# Patient Record
Sex: MALE | Race: WHITE | NOT HISPANIC OR LATINO | Employment: FULL TIME | URBAN - METROPOLITAN AREA
[De-identification: names, ages, dates, MRNs, and addresses within clinical notes are randomized per-mention and may not be internally consistent; named-entity substitution may affect disease eponyms.]

---

## 2017-01-09 ENCOUNTER — OFFICE VISIT (OUTPATIENT)
Dept: FAMILY MEDICINE | Facility: CLINIC | Age: 32
End: 2017-01-09
Payer: COMMERCIAL

## 2017-01-09 VITALS
HEART RATE: 89 BPM | HEIGHT: 70 IN | WEIGHT: 210 LBS | OXYGEN SATURATION: 97 % | SYSTOLIC BLOOD PRESSURE: 127 MMHG | BODY MASS INDEX: 30.06 KG/M2 | DIASTOLIC BLOOD PRESSURE: 77 MMHG | TEMPERATURE: 97.4 F

## 2017-01-09 DIAGNOSIS — R68.82 DECREASED LIBIDO: Primary | ICD-10-CM

## 2017-01-09 DIAGNOSIS — R53.83 FATIGUE, UNSPECIFIED TYPE: ICD-10-CM

## 2017-01-09 PROBLEM — F11.20 HEROIN ADDICTION (H): Status: ACTIVE | Noted: 2017-01-09

## 2017-01-09 LAB
BASOPHILS # BLD AUTO: 0.1 10E9/L (ref 0–0.2)
BASOPHILS NFR BLD AUTO: 0.4 %
DIFFERENTIAL METHOD BLD: ABNORMAL
EOSINOPHIL # BLD AUTO: 0.2 10E9/L (ref 0–0.7)
EOSINOPHIL NFR BLD AUTO: 1.3 %
ERYTHROCYTE [DISTWIDTH] IN BLOOD BY AUTOMATED COUNT: 12.6 % (ref 10–15)
HCT VFR BLD AUTO: 45.1 % (ref 40–53)
HGB BLD-MCNC: 14.8 G/DL (ref 13.3–17.7)
LYMPHOCYTES # BLD AUTO: 2.5 10E9/L (ref 0.8–5.3)
LYMPHOCYTES NFR BLD AUTO: 22.5 %
MCH RBC QN AUTO: 29.7 PG (ref 26.5–33)
MCHC RBC AUTO-ENTMCNC: 32.8 G/DL (ref 31.5–36.5)
MCV RBC AUTO: 90 FL (ref 78–100)
MONOCYTES # BLD AUTO: 1.2 10E9/L (ref 0–1.3)
MONOCYTES NFR BLD AUTO: 10.8 %
NEUTROPHILS # BLD AUTO: 7.3 10E9/L (ref 1.6–8.3)
NEUTROPHILS NFR BLD AUTO: 65 %
PLATELET # BLD AUTO: 202 10E9/L (ref 150–450)
RBC # BLD AUTO: 4.99 10E12/L (ref 4.4–5.9)
WBC # BLD AUTO: 11.2 10E9/L (ref 4–11)

## 2017-01-09 PROCEDURE — 85025 COMPLETE CBC W/AUTO DIFF WBC: CPT | Performed by: FAMILY MEDICINE

## 2017-01-09 PROCEDURE — 84403 ASSAY OF TOTAL TESTOSTERONE: CPT | Performed by: FAMILY MEDICINE

## 2017-01-09 PROCEDURE — 99214 OFFICE O/P EST MOD 30 MIN: CPT | Performed by: FAMILY MEDICINE

## 2017-01-09 PROCEDURE — 84270 ASSAY OF SEX HORMONE GLOBUL: CPT | Performed by: FAMILY MEDICINE

## 2017-01-09 PROCEDURE — 36415 COLL VENOUS BLD VENIPUNCTURE: CPT | Performed by: FAMILY MEDICINE

## 2017-01-09 PROCEDURE — 99000 SPECIMEN HANDLING OFFICE-LAB: CPT | Performed by: FAMILY MEDICINE

## 2017-01-09 ASSESSMENT — PAIN SCALES - GENERAL: PAINLEVEL: NO PAIN (0)

## 2017-01-09 NOTE — Clinical Note
[facility]   [address] [place]  [address]  [phone]                               January 13, 2017    Parish Carter  [address] [facility] [address]        Dear Parish,    Your testosterone level is normal    Results for orders placed or performed in visit on 01/09/17   CBC with platelets differential   Result Value Ref Range    WBC 11.2 (H) 4.0 - 11.0 10e9/L    RBC Count 4.99 4.4 - 5.9 10e12/L    Hemoglobin 14.8 13.3 - 17.7 g/dL    Hematocrit 45.1 40.0 - 53.0 %    MCV 90 78 - 100 fl    MCH 29.7 26.5 - 33.0 pg    MCHC 32.8 31.5 - 36.5 g/dL    RDW 12.6 10.0 - 15.0 %    Platelet Count 202 150 - 450 10e9/L    Diff Method Automated Method     % Neutrophils 65.0 %    % Lymphocytes 22.5 %    % Monocytes 10.8 %    % Eosinophils 1.3 %    % Basophils 0.4 %    Absolute Neutrophil 7.3 1.6 - 8.3 10e9/L    Absolute Lymphocytes 2.5 0.8 - 5.3 10e9/L    Absolute Monocytes 1.2 0.0 - 1.3 10e9/L    Absolute Eosinophils 0.2 0.0 - 0.7 10e9/L    Absolute Basophils 0.1 0.0 - 0.2 10e9/L   Testosterone Free and Total   Result Value Ref Range    Testosterone Total 460 240 - 950 ng/dL    Sex Hormone Binding Globulin 18 11 - 80 nmol/L    Free Testosterone Calculated 12.80 4.7 - 24.4 ng/dL   If you have any questions please call the clinic at [phone]    Sincerely,    Lul Armenta MD  bmd

## 2017-01-09 NOTE — PROGRESS NOTES
"  SUBJECTIVE:                                                    Parish Carter is a 31 year old male who presents to clinic today for the following health issues:    Patient is here for his blood pressure and would like to have his Testosterone checked.    Problem list and histories reviewed & adjusted, as indicated.    Ros: no chest pain, chest tightness   No sob   No leg edema   No abdominal pain     Denies fever or chills   Weight stable     Patient is fluctuating with his weight due to body building and his weight ballooned to 237   He did not work out in child     He is on probation   He gets testing done randomly     He has been clean for 10 months     Single   No kids     Mom is around   Father was an alcoholic and  from an accident     O: /77 mmHg  Pulse 89  Temp(Src) 97.4  F (36.3  C) (Oral)  Ht 5' 10\" (1.778 m)  Wt 210 lb (95.255 kg)  BMI 30.13 kg/m2  SpO2 97%  Patient is muscular   He is alert       Head: Normocephalic, atraumatic.  Eyes: Conjunctiva clear, non icteric. PERRLA.  Ears: External ears and TMs normal BL.  Nose: Septum midline, nasal mucosa pink and moist. No discharge.  Mouth / Throat: Normal dentition.  No oral lesions. Pharynx non erythematous, tonsils without hypertrophy.  Neck: Supple, no enlarged LN, trachea midline.    Chest wall normal to inspection and palpation. Good excursion bilaterally. Lungs clear to auscultation. Good air movement bilaterally without rales, wheezes, or rhonchi.   Regular rate and  rhythm. S1 and S2 normal, no murmurs, clicks, gallops or rubs. No edema or JVD.                ICD-10-CM    1. Decreased libido R68.82 Testosterone Free and Total     CANCELED: TESTOSTERONE, FREE & TOTAL   2. Fatigue, unspecified type R53.83 CBC with platelets differential     bp is normal   He should check it as an outpt   He is recovering from heroin and has been 10 months sober   I think his fatigue is still related to his heroin withdrawal     Await labs   "

## 2017-01-09 NOTE — MR AVS SNAPSHOT
"              After Visit Summary   2017    Parish Carter    MRN: [id number]           Patient Information     Date Of Birth          1985        Visit Information        Provider Department      2017 4:40 PM Lul Armenta MD [facility]        Today's Diagnoses     Decreased libido    -  1     Fatigue, unspecified type            Follow-ups after your visit        Who to contact     If you have questions or need follow up information about today's clinic visit or your schedule please contact [facility] directly at [phone].  Normal or non-critical lab and imaging results will be communicated to you by FirstStringhart, letter or phone within 4 business days after the clinic has received the results. If you do not hear from us within 7 days, please contact the clinic through FirstStringhart or phone. If you have a critical or abnormal lab result, we will notify you by phone as soon as possible.  Submit refill requests through [email or web address] or call your pharmacy and they will forward the refill request to us. Please allow 3 business days for your refill to be completed.          Additional Information About Your Visit        MyChart Information     [email or web address] lets you send messages to your doctor, view your test results, renew your prescriptions, schedule appointments and more. To sign up, go to [email or web address] . Click on \"Log in\" on the left side of the screen, which will take you to the Welcome page. Then click on \"Sign up Now\" on the right side of the page.     You will be asked to enter the access code listed below, as well as some personal information. Please follow the directions to create your username and password.     Your access code is: [email or web address]  Expires: 2017  5:23 PM     Your access code will  in 90 days. If you need help or a new code, please call your [facility] or [phone].        Care EveryWhere ID     This is your Care " "EveryWhere ID. This could be used by other organizations to access your [place] medical records  [id number]        Your Vitals Were     Pulse Temperature Height BMI (Body Mass Index) Pulse Oximetry       89 97.4  F (36.3  C) (Oral) 5' 10\" (1.778 m) 30.13 kg/m2 97%        Blood Pressure from Last 3 Encounters:   01/09/17 127/77   01/11/16 105/75    Weight from Last 3 Encounters:   01/09/17 210 lb (95.255 kg)   01/10/16 195 lb (88.451 kg)              We Performed the Following     CBC with platelets differential     TESTOSTERONE, FREE & TOTAL        Primary Care Provider    Physician No Ref-Primary       No address on file        Thank you!     Thank you for choosing [facility]  for your care. Our goal is always to provide you with excellent care. Hearing back from our patients is one way we can continue to improve our services. Please take a few minutes to complete the written survey that you may receive in the mail after your visit with us. Thank you!             Your Updated Medication List - Protect others around you: Learn how to safely use, store and throw away your medicines at www.disposemymeds.org.      Notice  As of 1/9/2017  5:23 PM    You have not been prescribed any medications.      "

## 2017-01-09 NOTE — Clinical Note
[facility]   [address] [place]  [address]  [phone]                                   January 10, 2017    Parish Carter  [address] [facility] [address]        Dear Parish,    Your recent hemoglobin and blood cell counts are normal.     Results for orders placed or performed in visit on 01/09/17   CBC with platelets differential   Result Value Ref Range    WBC 11.2 (H) 4.0 - 11.0 10e9/L    RBC Count 4.99 4.4 - 5.9 10e12/L    Hemoglobin 14.8 13.3 - 17.7 g/dL    Hematocrit 45.1 40.0 - 53.0 %    MCV 90 78 - 100 fl    MCH 29.7 26.5 - 33.0 pg    MCHC 32.8 31.5 - 36.5 g/dL    RDW 12.6 10.0 - 15.0 %    Platelet Count 202 150 - 450 10e9/L    Diff Method Automated Method     % Neutrophils 65.0 %    % Lymphocytes 22.5 %    % Monocytes 10.8 %    % Eosinophils 1.3 %    % Basophils 0.4 %    Absolute Neutrophil 7.3 1.6 - 8.3 10e9/L    Absolute Lymphocytes 2.5 0.8 - 5.3 10e9/L    Absolute Monocytes 1.2 0.0 - 1.3 10e9/L    Absolute Eosinophils 0.2 0.0 - 0.7 10e9/L    Absolute Basophils 0.1 0.0 - 0.2 10e9/L       If you have any questions please call the clinic at [phone]    Sincerely,    Lul Armenta MD  bmd

## 2017-01-09 NOTE — NURSING NOTE
"Chief Complaint   Patient presents with     Hypertension     Has been elevated     Blood Draw     Testosterone       Initial /77 mmHg  Pulse 89  Temp(Src) 97.4  F (36.3  C) (Oral)  Ht 5' 10\" (1.778 m)  Wt 210 lb (95.255 kg)  BMI 30.13 kg/m2  SpO2 97% Estimated body mass index is 30.13 kg/(m^2) as calculated from the following:    Height as of this encounter: 5' 10\" (1.778 m).    Weight as of this encounter: 210 lb (95.255 kg).  BP completed using cuff size: Juan Pablo Walker MA      "

## 2017-01-10 NOTE — PROGRESS NOTES
Quick Note:    Your total blood count was normal except for a mildly elevated white blood cell count  ______

## 2017-01-12 LAB
SHBG SERPL-SCNC: 18 NMOL/L (ref 11–80)
TESTOST FREE SERPL-MCNC: 12.8 NG/DL (ref 4.7–24.4)
TESTOST SERPL-MCNC: 460 NG/DL (ref 240–950)

## 2018-04-27 ENCOUNTER — OFFICE VISIT (OUTPATIENT)
Dept: FAMILY MEDICINE | Facility: CLINIC | Age: 33
End: 2018-04-27
Payer: COMMERCIAL

## 2018-04-27 VITALS
WEIGHT: 229 LBS | HEART RATE: 88 BPM | SYSTOLIC BLOOD PRESSURE: 148 MMHG | HEIGHT: 70 IN | TEMPERATURE: 97.7 F | BODY MASS INDEX: 32.78 KG/M2 | DIASTOLIC BLOOD PRESSURE: 86 MMHG

## 2018-04-27 DIAGNOSIS — R07.9 CHEST PAIN, UNSPECIFIED TYPE: ICD-10-CM

## 2018-04-27 DIAGNOSIS — F11.11 HISTORY OF HEROIN ABUSE (H): ICD-10-CM

## 2018-04-27 DIAGNOSIS — R03.0 ELEVATED BLOOD-PRESSURE READING WITHOUT DIAGNOSIS OF HYPERTENSION: Primary | ICD-10-CM

## 2018-04-27 PROCEDURE — 99214 OFFICE O/P EST MOD 30 MIN: CPT | Performed by: FAMILY MEDICINE

## 2018-04-27 NOTE — MR AVS SNAPSHOT
After Visit Summary   4/27/2018    Parish Carter    MRN: [id number]           Patient Information     Date Of Birth          1985        Visit Information        Provider Department      4/27/2018 3:20 PM Regina Fortune MD [facility] Instructions    -Recheck blood pressure on Tuesday, May 1, 2018.  -Follow up in the ER immediately if:  chest pain > 5 minutes, shortness of breath at rest, severe/worsening headache, vision changes, or other severe/emergent symptoms, as discussed.    [facility]   Discharged by : Vanda CANCINO MA    If you have any questions regarding your visit please contact your care team:     Team Gold                Clinic Hours Telephone Number     Dr. Kathy Cleveland, NP 7am-7pm  Monday - Thursday   7am-5pm  Fridays  [phone]   (Appointment scheduling available 24/7)     RN Line  [phone] option 2     Urgent Care - [place] - 11am-9pm Monday-Friday Saturday-Sunday- 9am-5pm     [place] -   5pm-9pm Monday-Friday Saturday-Sunday- 9am-5pm    [phone] - [place]    [phone] - [place]       For a Price Quote for your services, please call our Consumer Price Line at [phone].     What options do I have for visits at the clinic other than the traditional office visit?     To expand how we care for you, many of our providers are utilizing electronic visits (e-visits) and telephone visits, when medically appropriate, for interactions with their patients rather than a visit in the clinic. We also offer nurse visits for many medical concerns. Just like any other service, we will bill your insurance company for this type of visit based on time spent on the phone with your provider. Not all insurance companies cover these visits. Please check with your medical insurance if this type of  visit is covered. You will be responsible for any charges that are not paid by your insurance.   E-visits via EveryMovehart: generally incur a $35.00 fee.     Telephone visits:  Time spent on the phone: *charged based on time that is spent on the phone in increments of 10 minutes. Estimated cost:   5-10 mins $30.00   11-20 mins. $59.00   21-30 mins. $85.00       Use EveryMovehart (secure email communication and access to your chart) to send your primary care provider a message or make an appointment. Ask someone on your Team how to sign up for kissnofrog.     As always, Thank you for trusting us with your health care needs!      [facility]:    Scheduling Appointments  Jose Daniel, [place]  Call: [phone]    [facility]  Call: [phone]    [facility]  Call: [phone]    For Gastroenterology referrals   [facility] Gastroenterology   Clinics and Surgery Center, 4th Floor   [address] [place] [address]   Appointments: [phone]    WHERE TO GO FOR CARE?  Clinic    Make an appointment if you:       Are sick (cold, cough, flu, sore throat, earache or in pain).       Have a small injury (sprain, small cut, burn or broken bone).       Need a physical exam, Pap smear, vaccine or prescription refill.       Have questions about your health or medicines.    To reach us:      Call [phone] ([phone]). Open 24 hours every day. (For counseling services, call [phone].)    Log into kissnofrog at [email or web address] [email or web address]. (Visit Advanced [email or web address] to create an account.) Hospital emergency room    An emergency is a serious or life- threatening problem that must be treated right away.    Call [phone] or get to the hospital if you have:      Very bad or sudden:            - Chest pain or pressure         - Bleeding         - Head or belly pain         - Dizziness or trouble seeing, walking or                           Speaking      Problems breathing      Blood in your vomit or you are coughing up blood      A major injury (knocked out, loss of a finger or limb, rape, broken bone protruding from skin)    A mental health crisis. (Or call the Mental Health Crisis line at 1-325.445.8889 or Suicide Prevention Hotline at 1-437.129.8978.)    Open 24 hours every day. You don't need an appointment.     Urgent care    Visit urgent care for sickness or small injuries when the clinic is closed. You don't need an appointment. To check hours or find an urgent care near you, visit [email or web address]. Online care    Get online care from [employer] for more than 70 common problems, like colds, allergies and infections. Open 24 hours every day at:   [email or web address]   Need help deciding?    For advice about where to be seen, you may call your clinic and ask to speak with a nurse. We're here for you 24 hours every day.         If you are deaf or hard of hearing, please let us know. We provide many free services including sign language interpreters, oral interpreters, TTYs, telephone amplifiers, note takers and written materials.                   Follow-ups after your visit        Who to contact     If you have questions or need follow up information about today's clinic visit or your schedule please contact [facility] directly at [phone].  Normal or non-critical lab and imaging results will be communicated to you by Carbylan BioSurgeryhart, letter or phone within 4 business days after the clinic has received the results. If you do not hear from us within 7 days, please contact the clinic through Carbylan BioSurgeryhart or phone. If you have a critical or abnormal lab result, we will notify you by phone as soon as possible.  Submit refill requests through WISETIVI or call your pharmacy and they will forward the refill request to us. Please allow 3 business days for your refill to be completed.          Additional Information About Your Visit        WISETIVI  "Information     PictureMe Universe lets you send messages to your doctor, view your test results, renew your prescriptions, schedule appointments and more. To sign up, go to [email or web address] . Click on \"Log in\" on the left side of the screen, which will take you to the Welcome page. Then click on \"Sign up Now\" on the right side of the page.     You will be asked to enter the access code listed below, as well as some personal information. Please follow the directions to create your username and password.     Your access code is: [email or web address]  Expires: 2018  4:45 PM     Your access code will  in 90 days. If you need help or a new code, please call your [place] clinic or [phone].        Care EveryWhere ID     This is your Care EveryWhere ID. This could be used by other organizations to access your [place] medical records  [id number]        Your Vitals Were     Pulse Temperature Height BMI (Body Mass Index)          88 97.7  F (36.5  C) (Oral) 5' 10\" (1.778 m) 32.86 kg/m2         Blood Pressure from Last 3 Encounters:   18 148/86   17 127/77   16 105/75    Weight from Last 3 Encounters:   18 229 lb (103.9 kg)   17 210 lb (95.3 kg)   01/10/16 195 lb (88.5 kg)              Today, you had the following     No orders found for display       Primary Care Provider Fax #    Physician No Ref-Primary [phone]       No address on file        Equal Access to Services     [facility]: Hadii sri Caceres, waaxda luqadaha, qaybta kaalmaflorida blue . So [facility] [phone].    ATENCIÓN: Si habla español, tiene a martinez disposición servicios gratuitos de asistencia lingüística. Llame al [phone].    We comply with applicable federal civil rights laws and [place] laws. We do not discriminate on the basis of race, color, national origin, age, disability, sex, sexual orientation, or gender identity.            Thank you!     Thank you " for choosing [facility]  for your care. Our goal is always to provide you with excellent care. Hearing back from our patients is one way we can continue to improve our services. Please take a few minutes to complete the written survey that you may receive in the mail after your visit with us. Thank you!             Your Updated Medication List - Protect others around you: Learn how to safely use, store and throw away your medicines at www.disposemymeds.org.      Notice  As of 4/27/2018  4:45 PM    You have not been prescribed any medications.

## 2018-04-27 NOTE — PATIENT INSTRUCTIONS
-Recheck blood pressure on Tuesday, May 1, 2018.  -Follow up in the ER immediately if:  chest pain > 5 minutes, shortness of breath at rest, severe/worsening headache, vision changes, or other severe/emergent symptoms, as discussed.    [facility]   Discharged by : Vanda CANCINO MA    If you have any questions regarding your visit please contact your care team:     Team Gold                Clinic Hours Telephone Number     Dr. Kathy Cleveland NP 7am-7pm  Monday - Thursday   7am-5pm  Fridays  [phone]   (Appointment scheduling available 24/7)     RN Line  [phone] option 2     Urgent Care - [place] and [facility] - 11am-9pm Monday-Friday Saturday-Sunday- 9am-5pm     [place] -   5pm-9pm Monday-Friday Saturday-Sunday- 9am-5pm    [phone] - [place]    [phone] - [place]       For a Price Quote for your services, please call our Consumer Price Line at [phone].     What options do I have for visits at the clinic other than the traditional office visit?     To expand how we care for you, many of our providers are utilizing electronic visits (e-visits) and telephone visits, when medically appropriate, for interactions with their patients rather than a visit in the clinic. We also offer nurse visits for many medical concerns. Just like any other service, we will bill your insurance company for this type of visit based on time spent on the phone with your provider. Not all insurance companies cover these visits. Please check with your medical insurance if this type of visit is covered. You will be responsible for any charges that are not paid by your insurance.   E-visits via Polimax: generally incur a $35.00 fee.     Telephone visits:  Time spent on the phone: *charged based on time that is spent on the phone in increments of 10 minutes. Estimated cost:   5-10 mins $30.00   11-20  mins. $59.00   21-30 mins. $85.00       Use LiveRamp (secure email communication and access to your chart) to send your primary care provider a message or make an appointment. Ask someone on your Team how to sign up for LiveRamp.     As always, Thank you for trusting us with your health care needs!      [facility]:    Scheduling Appointments  [facility]  Call: [phone]    [place]  Call: [phone]    [facility]  Call: [phone]    For Gastroenterology referrals   [facility] Gastroenterology   Clinics and Surgery Center, 4th Floor   [address] [place] [address]   Appointments: [phone]    WHERE TO GO FOR CARE?  Clinic    Make an appointment if you:       Are sick (cold, cough, flu, sore throat, earache or in pain).       Have a small injury (sprain, small cut, burn or broken bone).       Need a physical exam, Pap smear, vaccine or prescription refill.       Have questions about your health or medicines.    To reach us:      Call [phone] ([phone]). Open 24 hours every day. (For counseling services, call [phone].)    Log into LiveRamp at [email or web address]. (Visit [email or web address] to create an account.) Hospital emergency room    An emergency is a serious or life- threatening problem that must be treated right away.    Call [phone] or get to the hospital if you have:      Very bad or sudden:            - Chest pain or pressure         - Bleeding         - Head or belly pain         - Dizziness or trouble seeing, walking or                          Speaking      Problems breathing      Blood in your vomit or you are coughing up blood      A major injury (knocked out, loss of a finger or limb, rape, broken bone protruding from skin)    A mental health crisis. (Or call the Mental Health Crisis line at 1-677.121.2462 or Suicide Prevention Hotline at 1-479.149.8701.)    Open 24 hours  every day. You don't need an appointment.     Urgent care    Visit urgent care for sickness or small injuries when the clinic is closed. You don't need an appointment. To check hours or find an urgent care near you, visit [email or web address]. Online care    Get online care from [facility] for more than 70 common problems, like colds, allergies and infections. Open 24 hours every day at:   [email or web address]   Need help deciding?    For advice about where to be seen, you may call your clinic and ask to speak with a nurse. We're here for you 24 hours every day.         If you are deaf or hard of hearing, please let us know. We provide many free services including sign language interpreters, oral interpreters, TTYs, telephone amplifiers, note takers and written materials.

## 2018-04-27 NOTE — PROGRESS NOTES
"  SUBJECTIVE:   Parish Carter is a 32 year old male who presents to clinic today for the following health issues:      Concern - Elevated Blood Pressure   Onset: 2 weeks     Description: Failed recent FIT testing due to elevated blood pressure (160/110).  Checking blood pressure with aunt's machine (170-190/113-130).  Having chest pain when taking a deep breath at times--especially when in a cramped position at work. Chest pain lasts < 5 seconds.  Mild shortness of breath at times (smoker), but no nausea, vomiting, or other associated chest pain symptoms. Typically runs 1-3 miles/day without symptoms, but hasn't worked out in 2 weeks.  No history of chest pain or shortness of breath during his usual workouts.    Intensity: Moderate concern regarding blood pressure.    Progression of Symptoms:  Same    Accompanying Signs & Symptoms:  Chest pain when taking a deep breath - not currently.  Patient took maternal aunt's Triamterene/HCTZ 37.5/25 mg daily x 3 days, but patient didn't take it today.     Previous history of similar problem:   None, but history of heroin use.  Patient has been sober x 2 years.      Precipitating factors:   Worsened by: NA     Alleviating factors:  Improved by: NA     FH:  Paternal uncle with MI at 36.  Mother and multiple relatives with HTN.  MGF had MI at 49.    SH:  Smokes 5 cigs/day.  Has smoked for 15 years.  Alcohol use:  10-15 drinks on the weekend.    Therapies Tried and outcome: Has been taking his aunt's blood pressure medication for 2 days, which makes him \"feel better\".  \"It might be psychological\".    Problem list and histories reviewed & adjusted, as indicated.  Additional history: as documented    Patient Active Problem List   Diagnosis     History of heroin abuse     No past surgical history on file.    Social History   Substance Use Topics     Smoking status: Current Every Day Smoker     Packs/day: 0.50     Types: Cigarettes     Smokeless tobacco: Never Used     Alcohol use No " "    No family history on file.      No current outpatient prescriptions on file.       Reviewed and updated as needed this visit by clinical staff  Tobacco  Allergies  Meds  Problems       Reviewed and updated as needed this visit by Provider  Allergies  Meds  Problems         ROS:  No vision changes, headache, chest pain, shortness of breath, cough, hemoptysis, edema, or bowel or bladder changes.    OBJECTIVE:     OBJECTIVE:  /86 (BP Location: Right arm, Patient Position: Sitting, Cuff Size: Adult Large)  Pulse 88  Temp 97.7  F (36.5  C) (Oral)  Ht 5' 10\" (1.778 m)  Wt 229 lb (103.9 kg)  BMI 32.86 kg/m2  GENERAL APPEARANCE:  Awake, alert, and in no acute distress.  Smells of smoke.  PSYCHIATRIC:  Pleasant affect.  HEENT:  Sclera anicteric.  No conjunctivitis.  PERRLA.  Extraocular movements are intact.  Bilateral TM's and canals are within normal limits.  No obvious nasal congestion.  No erythema, edema, or exudates of the oral mucosa or posterior pharynx.  Mucous membranes moist.  NECK:  Spontaneous full range of motion.  No thyromegaly or mass.  No lymphadenopathy.  HEART:  Normal S1, S2.  Regular rate and rhythm.  No murmurs, rubs, or gallops.  LUNGS:  No respiratory distress.  No wheezes, rales, or rhonchi.  ABDOMEN:  Not distended.  Soft.  Not tender.  No mass.  EXTREMITIES:  Moves 4 extremities.   No edema or calf tenderness.  NEUROLOGIC:  Gait within normal limits.  No facial droop or acute neurologic deficits.  SKIN:  No rash.    EKG:  Declined by patient today.    ASSESSMENT/PLAN:     1. Elevated blood-pressure reading without diagnosis of hypertension.  Blood pressure was also elevated during the patient's recent FIT test.  Patient tried his aunt's blood pressure medication recently.  See #3 below.      2. Chest pain, unspecified type, intermittent.  No current chest pain.  Chest pain does not sound cardiac, without exertional symptoms reported during his usual workouts.  Doubt pulmonary " embolus.  Patient declines further workup today.      3. History of heroin abuse, sober x 2 years.    -Discussed criteria for hypertension diagnosis.  -Recommended smoking cessation.  -Discussed impact of binge drinking on blood pressure, and advised patient to limit alcohol to 1-2 drinks/day.  -Recheck blood pressure on Tuesday, May 1, 2018.  -Remain off aunt's blood pressure medication.  -EKG (patient declines today) and labs to be considered at that time, only as appropriate.  -Follow up in the ER immediately if:  chest pain > 5 minutes, shortness of breath at rest, severe/worsening headache, vision changes, or other severe/emergent symptoms, as discussed.    Regina Fortune MD  [facility]

## 2018-04-28 PROBLEM — F11.11 HISTORY OF HEROIN ABUSE (H): Status: ACTIVE | Noted: 2017-01-09

## 2018-05-01 ENCOUNTER — OFFICE VISIT (OUTPATIENT)
Dept: FAMILY MEDICINE | Facility: CLINIC | Age: 33
End: 2018-05-01

## 2018-05-01 VITALS
HEIGHT: 70 IN | WEIGHT: 232 LBS | OXYGEN SATURATION: 97 % | BODY MASS INDEX: 33.21 KG/M2 | TEMPERATURE: 97.7 F | HEART RATE: 85 BPM | DIASTOLIC BLOOD PRESSURE: 84 MMHG | RESPIRATION RATE: 20 BRPM | SYSTOLIC BLOOD PRESSURE: 146 MMHG

## 2018-05-01 DIAGNOSIS — I10 ESSENTIAL HYPERTENSION: Primary | ICD-10-CM

## 2018-05-01 DIAGNOSIS — R42 DIZZINESS: ICD-10-CM

## 2018-05-01 DIAGNOSIS — R07.9 CHEST PAIN, UNSPECIFIED TYPE: ICD-10-CM

## 2018-05-01 LAB
ERYTHROCYTE [DISTWIDTH] IN BLOOD BY AUTOMATED COUNT: 13 % (ref 10–15)
HCT VFR BLD AUTO: 44 % (ref 40–53)
HGB BLD-MCNC: 14.6 G/DL (ref 13.3–17.7)
MCH RBC QN AUTO: 29.4 PG (ref 26.5–33)
MCHC RBC AUTO-ENTMCNC: 33.2 G/DL (ref 31.5–36.5)
MCV RBC AUTO: 89 FL (ref 78–100)
PLATELET # BLD AUTO: 211 10E9/L (ref 150–450)
RBC # BLD AUTO: 4.96 10E12/L (ref 4.4–5.9)
WBC # BLD AUTO: 10.7 10E9/L (ref 4–11)

## 2018-05-01 PROCEDURE — 84443 ASSAY THYROID STIM HORMONE: CPT | Performed by: FAMILY MEDICINE

## 2018-05-01 PROCEDURE — 80048 BASIC METABOLIC PNL TOTAL CA: CPT | Performed by: FAMILY MEDICINE

## 2018-05-01 PROCEDURE — 99214 OFFICE O/P EST MOD 30 MIN: CPT | Performed by: FAMILY MEDICINE

## 2018-05-01 PROCEDURE — 85027 COMPLETE CBC AUTOMATED: CPT | Performed by: FAMILY MEDICINE

## 2018-05-01 PROCEDURE — 36415 COLL VENOUS BLD VENIPUNCTURE: CPT | Performed by: FAMILY MEDICINE

## 2018-05-01 PROCEDURE — 93000 ELECTROCARDIOGRAM COMPLETE: CPT | Performed by: FAMILY MEDICINE

## 2018-05-01 ASSESSMENT — PAIN SCALES - GENERAL: PAINLEVEL: NO PAIN (0)

## 2018-05-01 ASSESSMENT — ENCOUNTER SYMPTOMS: ABDOMINAL PAIN: 0

## 2018-05-01 NOTE — PROGRESS NOTES
"SUBJECTIVE:   Parish Carter is a 32 year old male presenting with a chief complaint of   Chief Complaint   Patient presents with     Elevated Blood Pressure     Follow up visit from 04/27/2018.     Dizziness       He is an established patient of [facility].    The patient is a 32-year-old male, who presents for a blood pressure follow-up.  See visit note from 04/27/2018.  Intermittent chest pain is unchanged, only experienced for 1-2 seconds per episode, mainly with position changes.  No current chest pain.  No associated chest pain symptoms.  No nausea, vomiting, diaphoresis, shortness of breath, edema, or exertional symptoms.  Sober (no heroin or drugs) x 2 years.  Quit smoking after last visit.    Patient is concerned about an off balance sensation, as experienced 5 times in the past 24-48 hours.  Patient states he almost fell off a ladder on a few occasions, but the off balance sensation only lasts a few seconds.  No associated symptoms, nausea, vomiting, headache, ear concerns, URI symptoms, cough, lightheadedness, syncope, trauma, heart palpitations, GERD, vision problems, weakness, or paresthesias.   Chest pain does not occur with the off balance sensation.    Patient feels his blood pressure is improved today, as he stopped smoking after his last visit.  Patient is using Nicorette gum, with only some nicotine withdrawal symptoms noted.    Review of Systems   Gastrointestinal: Negative for abdominal pain.   No bowel or bladder changes.    Past Medical History:   Diagnosis Date     Substance abuse     IV Heroin for 2 1/2 years.      No family history on file.  No current outpatient prescriptions on file.     Social History   Substance Use Topics     Smoking status: Light Tobacco Smoker     Packs/day: 0.25     Types: Cigarettes     Smokeless tobacco: Never Used      Comment: Trying to quit.     Alcohol use No       OBJECTIVE  /84  Pulse 85  Temp 97.7  F (36.5  C) (Oral)  Resp 20  Ht 5' 10\" (1.778 m)  Wt " 232 lb (105.2 kg)  SpO2 97%  BMI 33.29 kg/m2    Physical Exam  GENERAL APPEARANCE:  Awake and alert.  PSYCHIATRIC:  Pleasant affect.  HEENT:  Sclera anicteric.  PERRLA.  Extraocular movements intact.  Fundi within normal limits.  Bilateral TM's and canals within normal limits.  No obvious nasal congestion.  No sinus tenderness.  Tongue protrudes midline.  No erythema, edema, or exudates of the oral mucosa or posterior pharynx.  Mucous membranes moist.  NECK:  Spontaneous full range of motion.  No thyromegaly or mass.  No lymphadenopathy.  No nuchal rigidity or tenderness.  HEART:  Normal S1, S2.  Regular rate and rhythm.  No murmurs, rubs, or gallops.  LUNGS:  No respiratory distress.  No wheezes, rales, or rhonchi.  ABDOMEN:   Not distended.  EXTREMITIES:  Moves 4 extremities symmetrically.  Intact strength and sensation noted.   No edema.  NEUROLOGIC:  Cranial nerves II-XII grossly intact.  Reflexes 2/5 and symmetric.  Romberg negative.  Finger-nose-finger testing intact.  Gait within normal limits.  Heel-toe walk intact.  SKIN:  No rash.    Labs:  Pending.    EKG:  Normal Sinus Rhythm without ST or T wave changes.    ASSESSMENT:      ICD-10-CM    1. Essential hypertension, new diagnosis.  Blood pressure seems to be improving post recent smoking cessation efforts. I10 Basic metabolic panel   2. Chest pain, unspecified type.  EKG today was within normal limits.  Doubt cardiac etiology, given the:  brief episodes of chest pain (seconds), benign EKG, and lack of exertional symptoms. R07.9 EKG 12-lead complete w/read - Clinics     Basic metabolic panel     CBC with platelets     TSH with free T4 reflex   3. Dizziness, intermittent x 24-48 hours.  Currently asymptomatic.  Patient is concerned about nicotine withdrawal.  Neurologic exam is within normal limits today.  Differential was considered. R42 Basic metabolic panel     CBC with platelets     TSH with free T4 reflex      PLAN:    -Patient declines AFTER VISIT  SUMMARY.  -Will call patient with his lab results.  -Patient declines blood pressure medications (e.g. Hydrochlorothiazide) or further workup of his dizziness at this time.  -Monitor blood pressure out in the community, as discussed.  -Recheck blood pressure at clinic exam in 1 month.  -Follow up immediately if emergent symptoms develop (e.g. chest pain > 5 minutes, shortness of breath), as discussed.  -Congratulated patient on his smoking cessation efforts.  -Continue to work on smoking cessation and avoidance of binge drinking.    Patient Instructions   Patient declines AFTER VISIT SUMMARY.

## 2018-05-01 NOTE — MR AVS SNAPSHOT
"              After Visit Summary   2018    Parish Carter    MRN: [id number]           Patient Information     Date Of Birth          1985        Visit Information        Provider Department      2018 3:00 PM Regina Fortune MD [facility]        Today's Diagnoses     Essential hypertension    -  1    Chest pain, unspecified type        Dizziness          Care Instructions    Patient declines AFTER VISIT SUMMARY.          Follow-ups after your visit        Who to contact     If you have questions or need follow up information about today's clinic visit or your schedule please contact [facility] directly at [phone].  Normal or non-critical lab and imaging results will be communicated to you by MyChart, letter or phone within 4 business days after the clinic has received the results. If you do not hear from us within 7 days, please contact the clinic through MyChart or phone. If you have a critical or abnormal lab result, we will notify you by phone as soon as possible.  Submit refill requests through [employer] or call your pharmacy and they will forward the refill request to us. Please allow 3 business days for your refill to be completed.          Additional Information About Your Visit        MyChart Information     [facility] lets you send messages to your doctor, view your test results, renew your prescriptions, schedule appointments and more. To sign up, go to [email or web address] . Click on \"Log in\" on the left side of the screen, which will take you to the Welcome page. Then click on \"Sign up Now\" on the right side of the page.     You will be asked to enter the access code listed below, as well as some personal information. Please follow the directions to create your username and password.     Your access code is: [email or web address]  Expires: 2018  4:45 PM     Your access code will  in 90 days. If you need help or a new code, please " "call your [place] clinic or [phone].        Care EveryWhere ID     This is your Care EveryWhere ID. This could be used by other organizations to access your [place] medical records  [id number]        Your Vitals Were     Pulse Temperature Respirations Height Pulse Oximetry BMI (Body Mass Index)    85 97.7  F (36.5  C) (Oral) 20 5' 10\" (1.778 m) 97% 33.29 kg/m2       Blood Pressure from Last 3 Encounters:   05/01/18 146/84   04/27/18 148/86   01/09/17 127/77    Weight from Last 3 Encounters:   05/01/18 232 lb (105.2 kg)   04/27/18 229 lb (103.9 kg)   01/09/17 210 lb (95.3 kg)              We Performed the Following     Basic metabolic panel     CBC with platelets     EKG 12-lead complete w/read - Clinics     TSH with free T4 reflex        Primary Care Provider Fax #    Physician No Ref-Primary [phone]       No address on file        Equal Access to Services     GURPREET CHEN : Hadii sri ku hadasho Soomaali, waaxda luqadaha, qaybta kaalmada adeegyada, waxay natividad starks . So [facility] [phone].    ATENCIÓN: Si habla español, tiene a martinez disposición servicios gratuitos de asistencia lingüística. Llame al [phone].    We comply with applicable federal civil rights laws and [place] laws. We do not discriminate on the basis of race, color, national origin, age, disability, sex, sexual orientation, or gender identity.            Thank you!     Thank you for choosing [facility]  for your care. Our goal is always to provide you with excellent care. Hearing back from our patients is one way we can continue to improve our services. Please take a few minutes to complete the written survey that you may receive in the mail after your visit with us. Thank you!             Your Updated Medication List - Protect others around you: Learn how to safely use, store and throw away your medicines at www.disposemymeds.org.      Notice  As of 5/1/2018  7:29 PM    You have not been " prescribed any medications.

## 2018-05-01 NOTE — NURSING NOTE
"Chief Complaint   Patient presents with     Hypertension       Initial /80 (BP Location: Right arm, Patient Position: Chair, Cuff Size: Adult Large)  Pulse 85  Temp 97.7  F (36.5  C) (Oral)  Resp 20  Ht 5' 10\" (1.778 m)  Wt 232 lb (105.2 kg)  SpO2 97%  BMI 33.29 kg/m2 Estimated body mass index is 33.29 kg/(m^2) as calculated from the following:    Height as of this encounter: 5' 10\" (1.778 m).    Weight as of this encounter: 232 lb (105.2 kg).  Medication Reconciliation: complete   Cherrie Pepper CMA      "

## 2018-05-02 LAB
ANION GAP SERPL CALCULATED.3IONS-SCNC: 7 MMOL/L (ref 3–14)
BUN SERPL-MCNC: 26 MG/DL (ref 7–30)
CALCIUM SERPL-MCNC: 8.6 MG/DL (ref 8.5–10.1)
CHLORIDE SERPL-SCNC: 109 MMOL/L (ref 94–109)
CO2 SERPL-SCNC: 25 MMOL/L (ref 20–32)
CREAT SERPL-MCNC: 0.93 MG/DL (ref 0.66–1.25)
GFR SERPL CREATININE-BSD FRML MDRD: >90 ML/MIN/1.7M2
GLUCOSE SERPL-MCNC: 82 MG/DL (ref 70–99)
POTASSIUM SERPL-SCNC: 4.2 MMOL/L (ref 3.4–5.3)
SODIUM SERPL-SCNC: 141 MMOL/L (ref 133–144)
TSH SERPL DL<=0.005 MIU/L-ACNC: 0.52 MU/L (ref 0.4–4)

## 2018-05-03 ENCOUNTER — TELEPHONE (OUTPATIENT)
Dept: FAMILY MEDICINE | Facility: CLINIC | Age: 33
End: 2018-05-03

## 2018-05-03 NOTE — TELEPHONE ENCOUNTER
Please notify patient that his lab results were all within normal limits.  Patient should follow his blood pressures and follow up in 1 month, as discussed at his visit.  Thank you.     Regina Fortune MD

## 2018-05-03 NOTE — TELEPHONE ENCOUNTER
Called patient and provided message below as per Dr. Lilly.  Patient verbalized understanding.    Chin Charles RN

## 2019-03-07 ENCOUNTER — OFFICE VISIT (OUTPATIENT)
Dept: FAMILY MEDICINE | Facility: CLINIC | Age: 34
End: 2019-03-07
Payer: COMMERCIAL

## 2019-03-07 VITALS
WEIGHT: 208 LBS | OXYGEN SATURATION: 99 % | BODY MASS INDEX: 29.78 KG/M2 | HEIGHT: 70 IN | TEMPERATURE: 96.7 F | DIASTOLIC BLOOD PRESSURE: 66 MMHG | SYSTOLIC BLOOD PRESSURE: 111 MMHG | HEART RATE: 99 BPM

## 2019-03-07 DIAGNOSIS — F11.11 HISTORY OF HEROIN ABUSE (H): Primary | ICD-10-CM

## 2019-03-07 PROCEDURE — 99213 OFFICE O/P EST LOW 20 MIN: CPT | Performed by: FAMILY MEDICINE

## 2019-03-07 ASSESSMENT — MIFFLIN-ST. JEOR: SCORE: 1884.73

## 2019-03-07 ASSESSMENT — PAIN SCALES - GENERAL: PAINLEVEL: NO PAIN (0)

## 2019-03-07 NOTE — PROGRESS NOTES
SUBJECTIVE:                                                    Parish Carter is a 33 year old male who presents to clinic today for the following health issues:  Patient with history of substance abuse, to heroin and comes in today to have a form filled.  He reports he is can start an outpatient treatment program.  He reports he had struggled throughout his life with his addictions.  He reports at one time he was able to be sober for over 3 years.  Recently, he started using heroin again in the past 2 weeks or so.    He does not smoke cigarettes however he denies smoking marijuana denies using any other drugs.  He has no other medical problems.    He denies depression, denies suicidal thoughts or ideation, denies any panic attack or Anxeity.  Has no chest pain no shortness of breath.  Does not take any other medication.    Patient has forms from the [facility] that needs to be filled out.    Problem list and histories reviewed & adjusted, as indicated.  Additional history: as documented    Patient Active Problem List   Diagnosis     History of heroin abuse     History reviewed. No pertinent surgical history.    Social History     Tobacco Use     Smoking status: Light Tobacco Smoker     Packs/day: 0.25     Types: Cigarettes     Smokeless tobacco: Never Used     Tobacco comment: Trying to quit.   Substance Use Topics     Alcohol use: No     History reviewed. No pertinent family history.      No current outpatient medications on file.     No Known Allergies  Recent Labs   Lab Test 05/01/18  1555 01/10/16  2214   ALT  --  25   CR 0.93 0.94   GFRESTIMATED >90 >90  Non African American GFR Calc     GFRESTBLACK >90 >90  African American GFR Calc     POTASSIUM 4.2 3.8   TSH 0.52  --         ROS:  Constitutional, HEENT, cardiovascular, pulmonary, gi and gu systems are negative, except as otherwise noted.    OBJECTIVE:     /66 (BP Location: Right arm, Patient Position: Chair, Cuff Size: Adult Large)   Pulse 99   Temp  "96.7  F (35.9  C) (Oral)   Ht 1.778 m (5' 10\")   Wt 94.3 kg (208 lb)   SpO2 99%   BMI 29.84 kg/m    Body mass index is 29.84 kg/m .  GENERAL: healthy, alert and no distress  MS: no gross musculoskeletal defects noted, no edema  PSYCH: mentation appears normal, affect normal/bright    Diagnostic Test Results:  none     ASSESSMENT/PLAN:       ICD-10-CM    1. History of heroin abuse Z87.898    form was filled, a copy was given to him  Was faxed and filled.    See Patient Instructions  There are no Patient Instructions on file for this visit.    Angel Barnes MD  [facility]      "

## 2019-10-22 ENCOUNTER — OFFICE VISIT (OUTPATIENT)
Dept: FAMILY MEDICINE | Facility: CLINIC | Age: 34
End: 2019-10-22
Payer: COMMERCIAL

## 2019-10-22 VITALS
HEART RATE: 91 BPM | DIASTOLIC BLOOD PRESSURE: 79 MMHG | WEIGHT: 213 LBS | TEMPERATURE: 98.7 F | OXYGEN SATURATION: 96 % | SYSTOLIC BLOOD PRESSURE: 118 MMHG | BODY MASS INDEX: 30.56 KG/M2

## 2019-10-22 DIAGNOSIS — F11.93 HEROIN WITHDRAWAL (H): Primary | ICD-10-CM

## 2019-10-22 PROCEDURE — 99214 OFFICE O/P EST MOD 30 MIN: CPT | Performed by: FAMILY MEDICINE

## 2019-10-22 RX ORDER — CLONIDINE HYDROCHLORIDE 0.1 MG/1
0.1 TABLET ORAL EVERY 6 HOURS PRN
Qty: 30 TABLET | Refills: 0 | Status: SHIPPED | OUTPATIENT
Start: 2019-10-22 | End: 2020-02-17

## 2019-10-22 RX ORDER — ONDANSETRON 4 MG/1
4 TABLET, FILM COATED ORAL EVERY 6 HOURS PRN
Qty: 30 TABLET | Refills: 0 | Status: SHIPPED | OUTPATIENT
Start: 2019-10-22 | End: 2020-02-17

## 2019-10-22 NOTE — PROGRESS NOTES
Subjective     Parish Carter is a 34 year old male who presents to clinic today for the following health issues:    HPI     Patient states he's coming off Opioids and requesting an Rx for Clonidine and Zofran.    Tried to come off of this cold turkey in the past and this is been very difficult for him.  We discussed Suboxone and methadone he is not interested in either of these methods.  He did not like the way felt on the Suboxone and methadone he does not want to get hooked on.  He really wants come totally off the medication.  He did okay when he did this in the past he was clean for 3 years.    Patient still currently taking medication.  Is been weaning the dose himself.  So far no side effects   He did use today     Past Medical History:   Diagnosis Date     Substance abuse (H)     IV Heroin for 2 1/2 years.        History reviewed. No pertinent surgical history.    History reviewed. No pertinent family history.    Social History     Tobacco Use     Smoking status: Light Tobacco Smoker     Packs/day: 0.25     Types: Cigarettes     Smokeless tobacco: Never Used     Tobacco comment: Trying to quit.   Substance Use Topics     Alcohol use: No     No current outpatient medications on file prior to visit.  No current facility-administered medications on file prior to visit.     Ros: no chest pain, chest tightness   No sob   No leg edema   No abdominal pain     Denies fever or chills   Weight stable       /79 (BP Location: Right arm, Patient Position: Sitting, Cuff Size: Adult Large)   Pulse 91   Temp 98.7  F (37.1  C) (Oral)   Wt 96.6 kg (213 lb)   SpO2 96%   BMI 30.56 kg/m      Chest wall normal to inspection and palpation. Good excursion bilaterally. Lungs clear to auscultation. Good air movement bilaterally without rales, wheezes, or rhonchi.   Regular rate and  rhythm. S1 and S2 normal, no murmurs, clicks, gallops or rubs. No edema or JVD.    No nasal congestion   No tremor       ICD-10-CM    1. Heroin  withdrawal (H) F11.23 cloNIDine (CATAPRES) 0.1 MG tablet     ondansetron (ZOFRAN) 4 MG tablet     She was given prescriptions for low-dose vomiting 0.1 mg up to every 6 hours.  He was given the cow scoring system.  His aunt is a nurse and lives next door as well he takes his blood pressure and pulse regularly.  He was given a handout the told him how to adjust the clonidine.  He is also planning on using Zofran for nausea    I did suggest the safest way to do this would be for him to go into the hospital and have the client to give it to him under direct supervision.  He risks having hypotension.  He might need to take this for as long as a week.  We did talk about detoxing and he states that they want to put him into a program when they detoxed him and he has no interest in going back into a program.  He does seem pretty sincere about trying to stop using heroin  He said if he starts getting symptoms on the clonidine that he will either call us or not take any.  He does understand the risks.    Total time spent face-to-face with the patient: 25 minutes.  Greater than 50% of time was spent in counseling.

## 2020-01-14 ENCOUNTER — OFFICE VISIT (OUTPATIENT)
Dept: FAMILY MEDICINE | Facility: CLINIC | Age: 35
End: 2020-01-14
Payer: COMMERCIAL

## 2020-01-14 VITALS
HEIGHT: 70 IN | SYSTOLIC BLOOD PRESSURE: 126 MMHG | WEIGHT: 243 LBS | HEART RATE: 76 BPM | DIASTOLIC BLOOD PRESSURE: 76 MMHG | BODY MASS INDEX: 34.79 KG/M2 | OXYGEN SATURATION: 97 % | TEMPERATURE: 97.6 F

## 2020-01-14 DIAGNOSIS — G56.01 CARPAL TUNNEL SYNDROME OF RIGHT WRIST: Primary | ICD-10-CM

## 2020-01-14 PROCEDURE — 99213 OFFICE O/P EST LOW 20 MIN: CPT | Performed by: FAMILY MEDICINE

## 2020-01-14 RX ORDER — GABAPENTIN 300 MG/1
CAPSULE ORAL
Qty: 180 CAPSULE | Refills: 3 | Status: SHIPPED | OUTPATIENT
Start: 2020-01-14 | End: 2020-02-17

## 2020-01-14 RX ORDER — BUPRENORPHINE AND NALOXONE 8; 2 MG/1; MG/1
FILM, SOLUBLE BUCCAL; SUBLINGUAL
Refills: 0 | COMMUNITY
Start: 2019-06-20 | End: 2024-02-22

## 2020-01-14 ASSESSMENT — PAIN SCALES - GENERAL: PAINLEVEL: MILD PAIN (2)

## 2020-01-14 ASSESSMENT — MIFFLIN-ST. JEOR: SCORE: 2033.49

## 2020-01-14 NOTE — PATIENT INSTRUCTIONS
Patient Education     Carpal Tunnel Syndrome    Carpal tunnel syndrome is a painful condition of the wrist and arm. It is caused by pressure on the median nerve. The median nerve is one of the nerves that give feeling and movement to the hand. It passes through a tunnel in the wrist called the carpal tunnel. This tunnel is made up of bones and ligaments. Narrowing of this tunnel or swelling of the tissues inside the tunnel puts pressure on the median nerve. This causes numbness, pins and needles, or electric shooting pains in your hand and forearm. Often the pain is worse at night and may wake you when you are asleep.  Carpal tunnel syndrome may occur during pregnancy and with use of birth control pills. It is more common in workers who must often bend their wrists. It is also common in people who work with power tools that cause strong vibrations.  Home care    Rest the painful wrist. Avoid repeated bending of the wrist back and forth. This puts pressure on the median nerve. Avoid using power tools with strong vibrations.    If you were given a splint, wear it at night while you sleep. You may also wear it during the day for comfort.    Move your fingers and wrists often to prevent stiffness.    Elevate your arms on pillows when you lie down.    Try using the unaffected hand more.    Try not to hold your wrists in a bent, downward position.    Sometimes changes in the work place may ease symptoms. If you type most of the day, it may help to change the position of your keyboard or add a wrist support. Your wrist should be in a neutral position and not bent back when typing.    You may use over-the-counter pain medicine to treat pain and inflammation, unless another medicine was prescribed. Anti-inflammatory pain medicines, such as ibuprofen or naproxen may be more effective than acetaminophen, which treats pain, but not inflammation. If you have chronic liver or kidney disease or ever had a stomach ulcer or  gastrointestinal bleeding, talk with your healthcare provider before using these medicines.    Opioid pain medicine will only give temporary relief and does not treat the problem. If pain continues, you may need a shot of a steroid drug into your wrist.    If the above methods fail, you may need surgery. This will open the carpal tunnel and release the pressure on the trapped nerve.  Follow-up care  Follow up with your healthcare provider, or as advised. If X-rays were taken, you will be notified of any new findings that may affect your care.  When to seek medical advice  Call your healthcare provider right away if any of these occur:    Pain not improving with the above treatment    Fingers or hand become cold, blue, numb, or tingly    Your whole arm becomes swollen or weak  Date Last Reviewed: 5/1/2018 2000-2019 [employer]. [address] [place] [address], [place] [address]. All rights reserved. This information is not intended as a substitute for professional medical care. Always follow your healthcare professional's instructions.

## 2020-01-14 NOTE — PROGRESS NOTES
Subjective     Parish Carter is a 34 year old male who presents to clinic today for the following health issues:    HPI :  Patient right-handed dominant comes in today reports symptoms of carpal tunnel to his right hand he is a ,  been having symptom for over a few years now.  He reports is worse at night.  Been wearing a cock-up splint.  Reports numbness tingling in his finger, decreased strength.      Concern - Carpal Tunnel  Onset: about two months ago    Description:   Pain in hands at night with emphasis to the right hand    Intensity: mild, moderate, severe    Progression of Symptoms:  worsening, same and constant    Accompanying Signs & Symptoms:  none    Previous history of similar problem:   yes    Precipitating factors:   Worsened by: suing hands    Alleviating factors:  Improved by: brace    Therapies Tried and outcome: Ibuprofen; not helpful    Patient Active Problem List   Diagnosis     History of heroin abuse (H)     History reviewed. No pertinent surgical history.    Social History     Tobacco Use     Smoking status: Light Tobacco Smoker     Packs/day: 0.25     Types: Cigarettes     Smokeless tobacco: Never Used     Tobacco comment: Trying to quit.   Substance Use Topics     Alcohol use: No     History reviewed. No pertinent family history.      Current Outpatient Medications   Medication Sig Dispense Refill     buprenorphine HCl-naloxone HCl (SUBOXONE) 8-2 MG per film TK 1 FILM SUBLIGUALLY BID  0     gabapentin (NEURONTIN) 300 MG capsule One tab at bedtime for one wk, then may increased 2 tab qhs 180 capsule 3     cloNIDine (CATAPRES) 0.1 MG tablet Take 1 tablet (0.1 mg) by mouth every 6 hours as needed (elevated blood pressure) (Patient not taking: Reported on 1/14/2020) 30 tablet 0     ondansetron (ZOFRAN) 4 MG tablet Take 1 tablet (4 mg) by mouth every 6 hours as needed for nausea (Patient not taking: Reported on 1/14/2020) 30 tablet 0       Reviewed and updated as needed this visit by  "Provider         Review of Systems   ROS COMP: Constitutional, HEENT, cardiovascular, pulmonary, gi and gu systems are negative, except as otherwise noted.      Objective    /76 (BP Location: Left arm, Patient Position: Chair, Cuff Size: Adult Large)   Pulse 76   Temp 97.6  F (36.4  C) (Oral)   Ht 1.77 m (5' 9.69\")   Wt 110.2 kg (243 lb)   SpO2 97%   BMI 35.18 kg/m    Body mass index is 35.18 kg/m .  Physical Exam   GENERAL APPEARANCE: healthy, alert and no distress  ORTHO: Wrist Exam: WRIST:  Inspection: Right   no swelling  Palpation: Tender: Tenderness:  Non-tender: distal radius  Range of Motion: normal  Strength: no deficits  strength decreased.    Special tests: positive Tinel's at carpal tunnel.  , positive Phalen's.      ELBOW:  elbow exam : Inspection: no swelling  Tender: Normal  Non-tender: Normal  Range of Motion: all normal  Strength: elbow strength full    NEURO: Normal strength and tone, mentation intact, speech normal and DTR symmetrically normal in lower extremities  PSYCH: mentation appears normal and affect normal/bright    Diagnostic Test Results:  Labs reviewed in Epic  none         Assessment & Plan       ICD-10-CM    1. Carpal tunnel syndrome of right wrist G56.01 gabapentin (NEURONTIN) 300 MG capsule     ORTHOPEDICS ADULT REFERRAL   Patient was advised to continue wearing his cock-up splint.  Was given gabapentin to use at night.    He was referred to an orthopedic.     See Patient Instructions    No follow-ups on file.    Angel Barnes MD  [facility]    "

## 2020-01-22 ENCOUNTER — OFFICE VISIT (OUTPATIENT)
Dept: ORTHOPEDICS | Facility: CLINIC | Age: 35
End: 2020-01-22
Payer: COMMERCIAL

## 2020-01-22 VITALS
BODY MASS INDEX: 35.82 KG/M2 | SYSTOLIC BLOOD PRESSURE: 126 MMHG | HEIGHT: 70 IN | DIASTOLIC BLOOD PRESSURE: 80 MMHG | OXYGEN SATURATION: 98 % | HEART RATE: 96 BPM | WEIGHT: 250.2 LBS

## 2020-01-22 DIAGNOSIS — G56.03 CARPAL TUNNEL SYNDROME ON BOTH SIDES: Primary | ICD-10-CM

## 2020-01-22 DIAGNOSIS — R20.0 HAND NUMBNESS: ICD-10-CM

## 2020-01-22 PROCEDURE — 99243 OFF/OP CNSLTJ NEW/EST LOW 30: CPT | Performed by: ORTHOPAEDIC SURGERY

## 2020-01-22 ASSESSMENT — MIFFLIN-ST. JEOR: SCORE: 2063.21

## 2020-01-22 ASSESSMENT — PAIN SCALES - GENERAL: PAINLEVEL: MILD PAIN (3)

## 2020-01-22 NOTE — LETTER
1/22/2020         RE: Parihs Carter  [address] [address]        Dear Colleague,    Thank you for referring your patient, Parish Carter, to the [facility]. Please see a copy of my visit note below.    CHIEF COMPLAINT:   Chief Complaint   Patient presents with     Cts     Bilateral hand numbness. Right is worse than left. Patient notes he has had this for 4 years. Patient notes he is into weight lifting and notices that the hands become numb when he starts lifting. Numbness in right hand is in the index and long finger as well as the inside of ring and thumb fingers. The left hand is starting to get bad now too. He has wore braces all day and at night. Night is worse.      Parish Carter is seen today in the [facility] for evaluation of bilateral hand pain, numbness and tingling  at the request of Dr. Angel Barnes MD          HISTORY:  Parish Carter is a 34 year old male , Left -hand dominant who is seen in for Bilateral hand numbness and tingling and pain x 4 years.  The symptoms have been constant, and helped by night brace.  Has not tried a injections.  he has numbness and tingling in radial 4 digits.  Other symptoms include pain up arm. He's had problems with heroin in the past and was in recovery a few years ago and started to lift weights to help with his recovery. Started noticing numbness and tingling. He then relapsed over a year ago and while on heroin didn't seem to notice the symptoms. About 3 months ago he went sober again and started to workout at the gym with weights and now the symptoms are worsening. Symptoms worse right more than left. Numbness and tingling index and long fingers, sometimes the ring finger and never the small finger. The thumb is usually ok. Treatment with ice, movement, brace at night, 900mg gabapentin. He works as a  and doesn't bother so much during the day, but worse at end of day/night. Recently  having more difficulties sleeping at night.    Suspected cause: Due to working out.  Pain severity: 3/10  Frequency of symptoms: are constant.  Aggravating Factors: sleeping, working out, use of hands.  Relieving Factors: wrist brace, rest.  Previous modalities tried: wrist brace. Ice. Movement. gabapentin  Prior wrist injury/trauma: denies    Usual level of work activity: .      Other PMH:  has a past medical history of Substance abuse (H).  Patient Active Problem List   Diagnosis     History of heroin abuse (H)       Surgical Hx:  has no past surgical history on file.    Medications:   Current Outpatient Medications:      buprenorphine HCl-naloxone HCl (SUBOXONE) 8-2 MG per film, TK 1 FILM SUBLIGUALLY BID, Disp: , Rfl: 0     cloNIDine (CATAPRES) 0.1 MG tablet, Take 1 tablet (0.1 mg) by mouth every 6 hours as needed (elevated blood pressure) (Patient not taking: Reported on 1/14/2020), Disp: 30 tablet, Rfl: 0     gabapentin (NEURONTIN) 300 MG capsule, One tab at bedtime for one wk, then may increased 2 tab qhs, Disp: 180 capsule, Rfl: 3     ondansetron (ZOFRAN) 4 MG tablet, Take 1 tablet (4 mg) by mouth every 6 hours as needed for nausea (Patient not taking: Reported on 1/14/2020), Disp: 30 tablet, Rfl: 0    Allergies: No Known Allergies    Social Hx:  reports that he has been smoking cigarettes. He has been smoking about 0.25 packs per day. He has never used smokeless tobacco. He reports that he does not drink alcohol or use drugs.    Family Hx: family history is not on file.. Negative for bleeding/clotting disorders. Negative for adverse anesthesia reactions.    REVIEW OF SYSTEMS: 10 point ROS neg other than the symptoms noted above in the HPI and PMH. Notables include  CONSTITUTIONAL:NEGATIVE for fever, chills, change in weight  INTEGUMENTARY/SKIN: NEGATIVE for worrisome rashes, moles or lesions  MUSCULOSKELETAL:See HPI above  Neurology: see HPI above.      EXAM:  /80   Pulse 96   Ht 1.765 m (5'  "9.5\")   Wt 113.5 kg (250 lb 3.2 oz)   SpO2 98%   BMI 36.42 kg/m     GENERAL APPEARANCE: healthy, alert and no distress   GAIT: NORMAL  SKIN: no suspicious lesions or rashes  RESPIRATORY: No increased work of breathing.  NEURO:     strength: normal,    thenar fasiculations: negative    Thenar atrophy: negative..    Sensation diminished in the index, long fingers right hand; otherwise grossly intact.   reflexes normal in upper extremities.   PSYCH:  mentation appears normal and affect normal, not anxious.    MUSCULOSKELETAL:    RIGHT HAND/FINGERS:    Skin intact. No abnormal skin discoloration, erythema or ecchymosis.   No nail pitting or clubbing.  Normal wear pattern, color and tone.  No observable or palpable masses of the fingers or palm or wrist.  No palpable triggering of fingers.   No observable or palpable cords or nodules of the fingers or palm.    There is no swelling in the wrist, hand, forearm.  There is no tenderness in the wrist.  There is no ecchymosis.  There is no erythema of the surrounding skin.  There is no maceration of the skin.  There is no deformity in the area.  Intact extensors. No extensor lag.    Special tests wrist:    Tinel's positive,    Phalen's positive.    Flexion/compression test positive.   Finkelstein's test: negative.   Ulnar piano sign: negative   Ulnar foveal tenderness:  negative    Special tests medial elbow ulnar nerve:    Tinel's negative,    Flexion/compression test negative.    Special tests median nerve proximal forearm:    Tinel's negative.    1st carpometacarpal grind: negative    Intact sensation light touch median, radial, ulnar nerves of the hand  Intact sensation to the radial and ulnar digital nerves of the fingers, as well as the finger tips.  Intact epl fpl fdp edc wrist flexion/extension biceps/triceps deltoid  Brisk capillary refill to all fingers.   Palpable radial pulse, 2+.      LEFT HAND/FINGERS:    Skin intact. No abnormal skin discoloration, " erythema or ecchymosis.   No nail pitting or clubbing.  Normal wear pattern, color and tone.  No observable or palpable masses of the fingers or palm or wrist.  No palpable triggering of fingers.   No observable or palpable cords or nodules of the fingers or palm.    There is no swelling in the wrist, hand, forearm.  There is no tenderness in the wrist.  There is no ecchymosis.  There is no erythema of the surrounding skin.  There is no maceration of the skin.  There is no deformity in the area.  Intact extensors. No extensor lag.    Special tests wrist:    Tinel's positive,    Phalen's positive.    Flexion/compression test positive.   Finkelstein's test: negative.   Ulnar piano sign: negative   Ulnar foveal tenderness:  negative    Special tests medial elbow ulnar nerve:    Tinel's negative,    Flexion/compression test negative.    Special tests median nerve proximal forearm:    Tinel's negative.    1st carpometacarpal grind: negative    Intact sensation light touch median, radial, ulnar nerves of the hand  Intact sensation to the radial and ulnar digital nerves of the fingers, as well as the finger tips.  Intact epl fpl fdp edc wrist flexion/extension biceps/triceps deltoid  Brisk capillary refill to all fingers.   Palpable radial pulse, 2+.      XRAYS: none indicated..    SPECIAL STUDIES:  EMG: none      ASSESSMENT/PLAN: 35yo LHD male with likely bilateral carpal tunnel syndrome, right more than left.    * discussed with patient signs and symptoms consistent with carpal tunnel syndrome. Carpal tunnel syndrome is compression or pinching of the median nerve at the wrist, as it enters the hand. There are many different causes, and in most cases, multifactorial.    * An indepth discussion was had with him about the options for treatment, which included activity modification to avoid aggravating activities, taking breaks during activities that cause symptoms, stretching, NSAIDS to help decrease inflammation and  swelling within the carpal tunnel, night splinting, corticosteroid injections, and carpal tunnel release.   * depending upon severity and duration of symptoms, nonoperative treatment is usually initiated, starting with least invasive modalities such as activity modification and a trial of night splints and NSAIDs.  * Cortisone injections are considered to decrease swelling and inflammation within the carpal tunnel and compression of the nerve.   * Lastly, carpal tunnel release should symptoms persist despite trial of nonoperative treatment, or in cases of severe carpal tunnel syndrome.  * risks of surgery, including, but not limited to: bleeding, infection, pain, scar, damage to adjacent structures (nerves, vessels, tendons), temporary versus permanent nerve injury, failure to relieve symptoms, recurrence of symptoms, incomplete release, stiffness, scar sensitivity and tenderness, need for further surgery, risks of anesthesia were discussed.  * in some cases, with severe, prolonged symptoms, or in situations of underlying peripheral neuropathy, there may be permanent nerve changes not amenable to surgery, that even with surgery, may not resolve.  * in some cases, it may take 6 months to a year or longer for symptoms to improve or resolve, but even then may not completely resolve.    * at this time, will proceed with bilateral upper extremity EMG studies. Referral placed.    * if he should plan on surgery, recommend discuss with primary care provider any postoperative pain management strategies but might be best to treat postoperative pain only with over the counter medications.  * I discussed that I would not prescribe pain medications.      * all patient's questions addressed and answered today.      Parish Lin M.D., M.S.  Dept. of Orthopaedic Surgery  [facility]      Again, thank you for allowing me to participate in the care of your patient.        Sincerely,        Parish Lin MD

## 2020-01-22 NOTE — PROGRESS NOTES
CHIEF COMPLAINT:   Chief Complaint   Patient presents with     Cts     Bilateral hand numbness. Right is worse than left. Patient notes he has had this for 4 years. Patient notes he is into weight lifting and notices that the hands become numb when he starts lifting. Numbness in right hand is in the index and long finger as well as the inside of ring and thumb fingers. The left hand is starting to get bad now too. He has wore braces all day and at night. Night is worse.      Parish Carter is seen today in the [facility] for evaluation of bilateral hand pain, numbness and tingling  at the request of Dr. Angel Barnes MD          HISTORY:  Parish Carter is a 34 year old male , Left -hand dominant who is seen in for Bilateral hand numbness and tingling and pain x 4 years.  The symptoms have been constant, and helped by night brace.  Has not tried a injections.  he has numbness and tingling in radial 4 digits.  Other symptoms include pain up arm. He's had problems with heroin in the past and was in recovery a few years ago and started to lift weights to help with his recovery. Started noticing numbness and tingling. He then relapsed over a year ago and while on heroin didn't seem to notice the symptoms. About 3 months ago he went sober again and started to workout at the gym with weights and now the symptoms are worsening. Symptoms worse right more than left. Numbness and tingling index and long fingers, sometimes the ring finger and never the small finger. The thumb is usually ok. Treatment with ice, movement, brace at night, 900mg gabapentin. He works as a  and doesn't bother so much during the day, but worse at end of day/night. Recently having more difficulties sleeping at night.    Suspected cause: Due to working out.  Pain severity: 3/10  Frequency of symptoms: are constant.  Aggravating Factors: sleeping, working out, use of hands.  Relieving Factors: wrist brace,  "rest.  Previous modalities tried: wrist brace. Ice. Movement. gabapentin  Prior wrist injury/trauma: denies    Usual level of work activity: .      Other PMH:  has a past medical history of Substance abuse (H).  Patient Active Problem List   Diagnosis     History of heroin abuse (H)       Surgical Hx:  has no past surgical history on file.    Medications:   Current Outpatient Medications:      buprenorphine HCl-naloxone HCl (SUBOXONE) 8-2 MG per film, TK 1 FILM SUBLIGUALLY BID, Disp: , Rfl: 0     cloNIDine (CATAPRES) 0.1 MG tablet, Take 1 tablet (0.1 mg) by mouth every 6 hours as needed (elevated blood pressure) (Patient not taking: Reported on 1/14/2020), Disp: 30 tablet, Rfl: 0     gabapentin (NEURONTIN) 300 MG capsule, One tab at bedtime for one wk, then may increased 2 tab qhs, Disp: 180 capsule, Rfl: 3     ondansetron (ZOFRAN) 4 MG tablet, Take 1 tablet (4 mg) by mouth every 6 hours as needed for nausea (Patient not taking: Reported on 1/14/2020), Disp: 30 tablet, Rfl: 0    Allergies: No Known Allergies    Social Hx:  reports that he has been smoking cigarettes. He has been smoking about 0.25 packs per day. He has never used smokeless tobacco. He reports that he does not drink alcohol or use drugs.    Family Hx: family history is not on file.. Negative for bleeding/clotting disorders. Negative for adverse anesthesia reactions.    REVIEW OF SYSTEMS: 10 point ROS neg other than the symptoms noted above in the HPI and PMH. Notables include  CONSTITUTIONAL:NEGATIVE for fever, chills, change in weight  INTEGUMENTARY/SKIN: NEGATIVE for worrisome rashes, moles or lesions  MUSCULOSKELETAL:See HPI above  Neurology: see HPI above.      EXAM:  /80   Pulse 96   Ht 1.765 m (5' 9.5\")   Wt 113.5 kg (250 lb 3.2 oz)   SpO2 98%   BMI 36.42 kg/m    GENERAL APPEARANCE: healthy, alert and no distress   GAIT: NORMAL  SKIN: no suspicious lesions or rashes  RESPIRATORY: No increased work of breathing.  NEURO:     " strength: normal,    thenar fasiculations: negative    Thenar atrophy: negative..    Sensation diminished in the index, long fingers right hand; otherwise grossly intact.   reflexes normal in upper extremities.   PSYCH:  mentation appears normal and affect normal, not anxious.    MUSCULOSKELETAL:    RIGHT HAND/FINGERS:    Skin intact. No abnormal skin discoloration, erythema or ecchymosis.   No nail pitting or clubbing.  Normal wear pattern, color and tone.  No observable or palpable masses of the fingers or palm or wrist.  No palpable triggering of fingers.   No observable or palpable cords or nodules of the fingers or palm.    There is no swelling in the wrist, hand, forearm.  There is no tenderness in the wrist.  There is no ecchymosis.  There is no erythema of the surrounding skin.  There is no maceration of the skin.  There is no deformity in the area.  Intact extensors. No extensor lag.    Special tests wrist:    Tinel's positive,    Phalen's positive.    Flexion/compression test positive.   Finkelstein's test: negative.   Ulnar piano sign: negative   Ulnar foveal tenderness:  negative    Special tests medial elbow ulnar nerve:    Tinel's negative,    Flexion/compression test negative.    Special tests median nerve proximal forearm:    Tinel's negative.    1st carpometacarpal grind: negative    Intact sensation light touch median, radial, ulnar nerves of the hand  Intact sensation to the radial and ulnar digital nerves of the fingers, as well as the finger tips.  Intact epl fpl fdp edc wrist flexion/extension biceps/triceps deltoid  Brisk capillary refill to all fingers.   Palpable radial pulse, 2+.      LEFT HAND/FINGERS:    Skin intact. No abnormal skin discoloration, erythema or ecchymosis.   No nail pitting or clubbing.  Normal wear pattern, color and tone.  No observable or palpable masses of the fingers or palm or wrist.  No palpable triggering of fingers.   No observable or palpable cords or nodules of  the fingers or palm.    There is no swelling in the wrist, hand, forearm.  There is no tenderness in the wrist.  There is no ecchymosis.  There is no erythema of the surrounding skin.  There is no maceration of the skin.  There is no deformity in the area.  Intact extensors. No extensor lag.    Special tests wrist:    Tinel's positive,    Phalen's positive.    Flexion/compression test positive.   Finkelstein's test: negative.   Ulnar piano sign: negative   Ulnar foveal tenderness:  negative    Special tests medial elbow ulnar nerve:    Tinel's negative,    Flexion/compression test negative.    Special tests median nerve proximal forearm:    Tinel's negative.    1st carpometacarpal grind: negative    Intact sensation light touch median, radial, ulnar nerves of the hand  Intact sensation to the radial and ulnar digital nerves of the fingers, as well as the finger tips.  Intact epl fpl fdp edc wrist flexion/extension biceps/triceps deltoid  Brisk capillary refill to all fingers.   Palpable radial pulse, 2+.      XRAYS: none indicated..    SPECIAL STUDIES:  EMG: none      ASSESSMENT/PLAN: 35yo LHD male with likely bilateral carpal tunnel syndrome, right more than left.    * discussed with patient signs and symptoms consistent with carpal tunnel syndrome. Carpal tunnel syndrome is compression or pinching of the median nerve at the wrist, as it enters the hand. There are many different causes, and in most cases, multifactorial.    * An indepth discussion was had with him about the options for treatment, which included activity modification to avoid aggravating activities, taking breaks during activities that cause symptoms, stretching, NSAIDS to help decrease inflammation and swelling within the carpal tunnel, night splinting, corticosteroid injections, and carpal tunnel release.   * depending upon severity and duration of symptoms, nonoperative treatment is usually initiated, starting with least invasive modalities such  as activity modification and a trial of night splints and NSAIDs.  * Cortisone injections are considered to decrease swelling and inflammation within the carpal tunnel and compression of the nerve.   * Lastly, carpal tunnel release should symptoms persist despite trial of nonoperative treatment, or in cases of severe carpal tunnel syndrome.  * risks of surgery, including, but not limited to: bleeding, infection, pain, scar, damage to adjacent structures (nerves, vessels, tendons), temporary versus permanent nerve injury, failure to relieve symptoms, recurrence of symptoms, incomplete release, stiffness, scar sensitivity and tenderness, need for further surgery, risks of anesthesia were discussed.  * in some cases, with severe, prolonged symptoms, or in situations of underlying peripheral neuropathy, there may be permanent nerve changes not amenable to surgery, that even with surgery, may not resolve.  * in some cases, it may take 6 months to a year or longer for symptoms to improve or resolve, but even then may not completely resolve.    * at this time, will proceed with bilateral upper extremity EMG studies. Referral placed.    * if he should plan on surgery, recommend discuss with primary care provider any postoperative pain management strategies but might be best to treat postoperative pain only with over the counter medications.  * I discussed that I would not prescribe pain medications.      * all patient's questions addressed and answered today.      Parish Lin M.D., M.S.  Dept. of Orthopaedic Surgery  [facility]

## 2020-01-27 ENCOUNTER — TELEPHONE (OUTPATIENT)
Dept: ORTHOPEDICS | Facility: CLINIC | Age: 35
End: 2020-01-27

## 2020-01-27 NOTE — TELEPHONE ENCOUNTER
Reason for call:  Other   Patient called regarding (reason for call): call back  Additional comments:  Patient calling. He is scheduled for an emg next Monday. Can he get a cortisone injection before that? Please call and advise.     Phone number to reach patient:  Home number on file [phone] (home)    Best Time:   Any     Can we leave a detailed message on this number?  YES

## 2020-01-27 NOTE — TELEPHONE ENCOUNTER
Called and left a vm for the patient letting him know that he would not be able to get an injection prior to the EMG study. I left our call back number.  Kaylan Anderson Certified Medical Assistant

## 2020-02-03 ENCOUNTER — TRANSFERRED RECORDS (OUTPATIENT)
Dept: HEALTH INFORMATION MANAGEMENT | Facility: CLINIC | Age: 35
End: 2020-02-03

## 2020-02-10 ENCOUNTER — TELEPHONE (OUTPATIENT)
Dept: ORTHOPEDICS | Facility: CLINIC | Age: 35
End: 2020-02-10

## 2020-02-10 PROBLEM — G56.03 CARPAL TUNNEL SYNDROME ON BOTH SIDES: Status: ACTIVE | Noted: 2020-02-10

## 2020-02-10 PROBLEM — R20.0 HAND NUMBNESS: Status: ACTIVE | Noted: 2020-02-10

## 2020-02-10 NOTE — TELEPHONE ENCOUNTER
Type of surgery: Release carpal tunnel left   CPT 20889   Carpal tunnel syndrome on both sides G56.03   Hand Numbness R20.0  Location of surgery: [facility]  Date and time of surgery: 02/27/2020 ./ [facility]   Surgeon: KAJAL   Pre-Op Appt Date: 02/17/2020  Post-Op Appt Date: 03/18/2020    Packet sent out: Yes  Pre-cert/Authorization completed:  I have submitted prior auth to [employer] Reviewer, Tracking ID [id number] and Ref # [id number]. Per Interactive care reviewer, Review Not Required.   Date: 02/17/2020    Thank you,   Phuong Rea   Referral Department  [phone]

## 2020-02-17 ENCOUNTER — OFFICE VISIT (OUTPATIENT)
Dept: FAMILY MEDICINE | Facility: CLINIC | Age: 35
End: 2020-02-17
Payer: COMMERCIAL

## 2020-02-17 VITALS
HEIGHT: 70 IN | BODY MASS INDEX: 34.79 KG/M2 | SYSTOLIC BLOOD PRESSURE: 119 MMHG | OXYGEN SATURATION: 95 % | DIASTOLIC BLOOD PRESSURE: 75 MMHG | TEMPERATURE: 98.2 F | HEART RATE: 75 BPM | WEIGHT: 243 LBS

## 2020-02-17 DIAGNOSIS — F19.10 SUBSTANCE ABUSE (H): ICD-10-CM

## 2020-02-17 DIAGNOSIS — G56.03 CARPAL TUNNEL SYNDROME ON BOTH SIDES: ICD-10-CM

## 2020-02-17 DIAGNOSIS — Z72.0 TOBACCO ABUSE: ICD-10-CM

## 2020-02-17 DIAGNOSIS — Z01.818 PREOP GENERAL PHYSICAL EXAM: Primary | ICD-10-CM

## 2020-02-17 PROCEDURE — 99214 OFFICE O/P EST MOD 30 MIN: CPT | Performed by: FAMILY MEDICINE

## 2020-02-17 ASSESSMENT — MIFFLIN-ST. JEOR: SCORE: 2033.49

## 2020-02-17 ASSESSMENT — PAIN SCALES - GENERAL: PAINLEVEL: NO PAIN (0)

## 2020-02-18 NOTE — H&P (VIEW-ONLY)
[address] [address]  [phone]  Dept: [phone]    PRE-OP EVALUATION:  Today's date: 2020    Parish Carter (: 1985) presents for pre-operative evaluation assessment as requested by Dr. Lin.  He requires evaluation and anesthesia risk assessment prior to undergoing surgery/procedure for treatment of carpal tunnel .      Primary Physician: No Ref-Primary, Physician  Type of Anesthesia Anticipated: to be determined    Patient has a Health Care Directive or Living Will:  NO    Preop Questions 2020   Who is doing your surgery? Parish Lin   What are you having done? carpal tunnel relief   Date of Surgery/Procedure: 2020   Facility or Hospital where procedure/surgery will be performed: [facility]   1.  Do you have a history of Heart attack, stroke, stent, coronary bypass surgery, or other heart surgery? No   2.  Do you ever have any pain or discomfort in your chest? No   3.  Do you have a history of  Heart Failure? No   4.   Are you troubled by shortness of breath when:  walking on a level surface, or up a slight hill, or at night? No   5.  Do you currently have a cold, bronchitis or other respiratory infection? No   6.  Do you have a cough, shortness of breath, or wheezing? No   7.  Do you sometimes get pains in the calves of your legs when you walk? No   8. Do you or anyone in your family have previous history of blood clots? No   9.  Do you or does anyone in your family have a serious bleeding problem such as prolonged bleeding following surgeries or cuts? No   10. Have you ever had problems with anemia or been told to take iron pills? No   11. Have you had any abnormal blood loss such as black, tarry or bloody stools? No   12. Have you ever had a blood transfusion? No   13. Have you or any of your relatives ever had problems with anesthesia? No   14. Do you have sleep apnea, excessive snoring or  daytime drowsiness? No   15. Do you have any prosthetic heart valves? No   16. Do you have prosthetic joints? No         HPI:     HPI related to upcoming procedure: Patient scheduled to have a carpal tunnel release, left hand on 02/27/2020.  Doing after 3 weeks for wound to the right side.    He is a smoker.  Denies chest pain, short of breath.  No recent sickness.  Denies history of heart disease, denies lower extremity edema.    See problem list for active medical problems.  Problems all longstanding and stable, except as noted/documented.  See ROS for pertinent symptoms related to these conditions.      MEDICAL HISTORY:     Patient Active Problem List    Diagnosis Date Noted     Hand numbness 02/10/2020     Priority: Medium     Added automatically from request for surgery [id number]       Carpal tunnel syndrome on both sides 02/10/2020     Priority: Medium     Added automatically from request for surgery [id number]       History of heroin abuse (H) 01/09/2017     Priority: Medium     Sober since 2016.        Past Medical History:   Diagnosis Date     Substance abuse (H)     IV Heroin for 2 1/2 years.      History reviewed. No pertinent surgical history.  Current Outpatient Medications   Medication Sig Dispense Refill     buprenorphine HCl-naloxone HCl (SUBOXONE) 8-2 MG per film TK 1 FILM SUBLIGUALLY BID  0     cloNIDine (CATAPRES) 0.1 MG tablet Take 1 tablet (0.1 mg) by mouth every 6 hours as needed (elevated blood pressure) (Patient not taking: Reported on 1/14/2020) 30 tablet 0     gabapentin (NEURONTIN) 300 MG capsule One tab at bedtime for one wk, then may increased 2 tab qhs (Patient not taking: Reported on 2/17/2020) 180 capsule 3     ondansetron (ZOFRAN) 4 MG tablet Take 1 tablet (4 mg) by mouth every 6 hours as needed for nausea (Patient not taking: Reported on 1/14/2020) 30 tablet 0     OTC products: None, except as noted above    No Known Allergies   Latex Allergy: NO    Social History     Tobacco Use      "Smoking status: Light Tobacco Smoker     Packs/day: 0.25     Types: Cigarettes     Smokeless tobacco: Never Used     Tobacco comment: Trying to quit.   Substance Use Topics     Alcohol use: No     History   Drug Use No       REVIEW OF SYSTEMS:   Constitutional, neuro, ENT, endocrine, pulmonary, cardiac, gastrointestinal, genitourinary, musculoskeletal, integument and psychiatric systems are negative, except as otherwise noted.    EXAM:   /75 (BP Location: Right arm, Patient Position: Chair, Cuff Size: Adult Large)   Pulse 75   Temp 98.2  F (36.8  C) (Oral)   Ht 1.77 m (5' 9.69\")   Wt 110.2 kg (243 lb)   SpO2 95%   BMI 35.18 kg/m      GENERAL APPEARANCE: healthy, alert and no distress     HENT: ear canals and TM's normal and nose and mouth without ulcers or lesions     RESP: lungs clear to auscultation - no rales, rhonchi or wheezes     CV: regular rates and rhythm, normal S1 S2, no S3 or S4 and no murmur, click or rub     ABDOMEN:  soft, nontender, no HSM or masses and bowel sounds normal     MS: extremities normal- no gross deformities noted, no evidence of inflammation in joints, FROM in all extremities.     SKIN: no suspicious lesions or rashes     NEURO: Normal strength and tone, sensory exam grossly normal, mentation intact and speech normal     PSYCH: mentation appears normal. and affect normal/bright     LYMPHATICS: No cervical adenopathy    DIAGNOSTICS:   No labs or EKG required for low risk surgery (cataract, skin procedure, breast biopsy, etc)    Recent Labs   Lab Test 05/01/18  1555 01/09/17  1734 01/10/16  2214   HGB 14.6 14.8 12.5*    202 237     --  140   POTASSIUM 4.2  --  3.8   CR 0.93  --  0.94        IMPRESSION:   Reason for surgery/procedure: Carpal tunnel release, left hand  Diagnosis/reason for consult: Carpal tunnel syndrome.    The proposed surgical procedure is considered INTERMEDIATE risk.    REVISED CARDIAC RISK INDEX  The patient has the following serious " cardiovascular risks for perioperative complications such as (MI, PE, VFib and 3  AV Block):  No serious cardiac risks  INTERPRETATION: 0 risks: Class I (very low risk - 0.4% complication rate)    The patient has the following additional risks for perioperative complications:  No identified additional risks     Diagnosis Comments      Diagnosis Comments   1. Preop general physical exam     2. Carpal tunnel syndrome on both sides     3. Tobacco abuse     4. Substance abuse (H)           RECOMMENDATIONS:       --Patient is to take all scheduled medications on the day of surgery EXCEPT for modifications listed below.    APPROVAL GIVEN to proceed with proposed procedure, without further diagnostic evaluation       Signed Electronically by: Angel Barnes MD    Copy of this evaluation report is provided to requesting physician.    [place] Preop Guidelines    Revised Cardiac Risk Index

## 2020-02-18 NOTE — PATIENT INSTRUCTIONS
Before Your Surgery      Call your surgeon if there is any change in your health. This includes signs of a cold or flu (such as a sore throat, runny nose, cough, rash or fever).    Do not smoke, drink alcohol or take over the counter medicine (unless your surgeon or primary care doctor tells you to) for the 24 hours before and after surgery.    If you take prescribed drugs: Follow your doctor s orders about which medicines to take and which to stop until after surgery.    Eating and drinking prior to surgery: follow the instructions from your surgeon    Take a shower or bath the night before surgery. Use the soap your surgeon gave you to gently clean your skin. If you do not have soap from your surgeon, use your regular soap. Do not shave or scrub the surgery site.  Wear clean pajamas and have clean sheets on your bed.   Nothing to eat or drink after Midnight.  Hold all NSAID and blood thinner 7 days, before surgery ( Aspirin, Ibuprofen, Naproxen, etc, ), and Coumadin.

## 2020-02-18 NOTE — PROGRESS NOTES
[address] [address]  [phone]  Dept: [phone]    PRE-OP EVALUATION:  Today's date: 2020    Parish Carter (: 1985) presents for pre-operative evaluation assessment as requested by Dr. Lin.  He requires evaluation and anesthesia risk assessment prior to undergoing surgery/procedure for treatment of carpal tunnel .      Primary Physician: No Ref-Primary, Physician  Type of Anesthesia Anticipated: to be determined    Patient has a Health Care Directive or Living Will:  NO    Preop Questions 2020   Who is doing your surgery? Parish Lin   What are you having done? carpal tunnel relief   Date of Surgery/Procedure: 2020   Facility or Hospital where procedure/surgery will be performed: [facility] Location   1.  Do you have a history of Heart attack, stroke, stent, coronary bypass surgery, or other heart surgery? No   2.  Do you ever have any pain or discomfort in your chest? No   3.  Do you have a history of  Heart Failure? No   4.   Are you troubled by shortness of breath when:  walking on a level surface, or up a slight hill, or at night? No   5.  Do you currently have a cold, bronchitis or other respiratory infection? No   6.  Do you have a cough, shortness of breath, or wheezing? No   7.  Do you sometimes get pains in the calves of your legs when you walk? No   8. Do you or anyone in your family have previous history of blood clots? No   9.  Do you or does anyone in your family have a serious bleeding problem such as prolonged bleeding following surgeries or cuts? No   10. Have you ever had problems with anemia or been told to take iron pills? No   11. Have you had any abnormal blood loss such as black, tarry or bloody stools? No   12. Have you ever had a blood transfusion? No   13. Have you or any of your relatives ever had problems with anesthesia? No   14. Do you have sleep apnea, excessive snoring or  daytime drowsiness? No   15. Do you have any prosthetic heart valves? No   16. Do you have prosthetic joints? No         HPI:     HPI related to upcoming procedure: Patient scheduled to have a carpal tunnel release, left hand on 02/27/2020.  Doing after 3 weeks for wound to the right side.    He is a smoker.  Denies chest pain, short of breath.  No recent sickness.  Denies history of heart disease, denies lower extremity edema.    See problem list for active medical problems.  Problems all longstanding and stable, except as noted/documented.  See ROS for pertinent symptoms related to these conditions.      MEDICAL HISTORY:     Patient Active Problem List    Diagnosis Date Noted     Hand numbness 02/10/2020     Priority: Medium     Added automatically from request for surgery [id number]       Carpal tunnel syndrome on both sides 02/10/2020     Priority: Medium     Added automatically from request for surgery [id number]       History of heroin abuse (H) 01/09/2017     Priority: Medium     Sober since 2016.        Past Medical History:   Diagnosis Date     Substance abuse (H)     IV Heroin for 2 1/2 years.      History reviewed. No pertinent surgical history.  Current Outpatient Medications   Medication Sig Dispense Refill     buprenorphine HCl-naloxone HCl (SUBOXONE) 8-2 MG per film TK 1 FILM SUBLIGUALLY BID  0     cloNIDine (CATAPRES) 0.1 MG tablet Take 1 tablet (0.1 mg) by mouth every 6 hours as needed (elevated blood pressure) (Patient not taking: Reported on 1/14/2020) 30 tablet 0     gabapentin (NEURONTIN) 300 MG capsule One tab at bedtime for one wk, then may increased 2 tab qhs (Patient not taking: Reported on 2/17/2020) 180 capsule 3     ondansetron (ZOFRAN) 4 MG tablet Take 1 tablet (4 mg) by mouth every 6 hours as needed for nausea (Patient not taking: Reported on 1/14/2020) 30 tablet 0     OTC products: None, except as noted above    No Known Allergies   Latex Allergy: NO    Social History     Tobacco Use      "Smoking status: Light Tobacco Smoker     Packs/day: 0.25     Types: Cigarettes     Smokeless tobacco: Never Used     Tobacco comment: Trying to quit.   Substance Use Topics     Alcohol use: No     History   Drug Use No       REVIEW OF SYSTEMS:   Constitutional, neuro, ENT, endocrine, pulmonary, cardiac, gastrointestinal, genitourinary, musculoskeletal, integument and psychiatric systems are negative, except as otherwise noted.    EXAM:   /75 (BP Location: Right arm, Patient Position: Chair, Cuff Size: Adult Large)   Pulse 75   Temp 98.2  F (36.8  C) (Oral)   Ht 1.77 m (5' 9.69\")   Wt 110.2 kg (243 lb)   SpO2 95%   BMI 35.18 kg/m      GENERAL APPEARANCE: healthy, alert and no distress     HENT: ear canals and TM's normal and nose and mouth without ulcers or lesions     RESP: lungs clear to auscultation - no rales, rhonchi or wheezes     CV: regular rates and rhythm, normal S1 S2, no S3 or S4 and no murmur, click or rub     ABDOMEN:  soft, nontender, no HSM or masses and bowel sounds normal     MS: extremities normal- no gross deformities noted, no evidence of inflammation in joints, FROM in all extremities.     SKIN: no suspicious lesions or rashes     NEURO: Normal strength and tone, sensory exam grossly normal, mentation intact and speech normal     PSYCH: mentation appears normal. and affect normal/bright     LYMPHATICS: No cervical adenopathy    DIAGNOSTICS:   No labs or EKG required for low risk surgery (cataract, skin procedure, breast biopsy, etc)    Recent Labs   Lab Test 05/01/18  1555 01/09/17  1734 01/10/16  2214   HGB 14.6 14.8 12.5*    202 237     --  140   POTASSIUM 4.2  --  3.8   CR 0.93  --  0.94        IMPRESSION:   Reason for surgery/procedure: Carpal tunnel release, left hand  Diagnosis/reason for consult: Carpal tunnel syndrome.    The proposed surgical procedure is considered INTERMEDIATE risk.    REVISED CARDIAC RISK INDEX  The patient has the following serious " cardiovascular risks for perioperative complications such as (MI, PE, VFib and 3  AV Block):  No serious cardiac risks  INTERPRETATION: 0 risks: Class I (very low risk - 0.4% complication rate)    The patient has the following additional risks for perioperative complications:  No identified additional risks     Diagnosis Comments      Diagnosis Comments   1. Preop general physical exam     2. Carpal tunnel syndrome on both sides     3. Tobacco abuse     4. Substance abuse (H)           RECOMMENDATIONS:       --Patient is to take all scheduled medications on the day of surgery EXCEPT for modifications listed below.    APPROVAL GIVEN to proceed with proposed procedure, without further diagnostic evaluation       Signed Electronically by: Angel Barnes MD    Copy of this evaluation report is provided to requesting physician.    [place] Preop Guidelines    Revised Cardiac Risk Index

## 2020-02-26 ENCOUNTER — ANESTHESIA EVENT (OUTPATIENT)
Dept: SURGERY | Facility: AMBULATORY SURGERY CENTER | Age: 35
End: 2020-02-26

## 2020-02-27 ENCOUNTER — HOSPITAL ENCOUNTER (OUTPATIENT)
Facility: AMBULATORY SURGERY CENTER | Age: 35
Discharge: HOME | End: 2020-02-27
Attending: ORTHOPAEDIC SURGERY | Admitting: ORTHOPAEDIC SURGERY
Payer: COMMERCIAL

## 2020-02-27 ENCOUNTER — ANESTHESIA (OUTPATIENT)
Dept: SURGERY | Facility: AMBULATORY SURGERY CENTER | Age: 35
End: 2020-02-27
Payer: COMMERCIAL

## 2020-02-27 VITALS
TEMPERATURE: 97.7 F | DIASTOLIC BLOOD PRESSURE: 67 MMHG | SYSTOLIC BLOOD PRESSURE: 112 MMHG | OXYGEN SATURATION: 96 % | RESPIRATION RATE: 18 BRPM

## 2020-02-27 DIAGNOSIS — R20.0 HAND NUMBNESS: ICD-10-CM

## 2020-02-27 DIAGNOSIS — G56.03 CARPAL TUNNEL SYNDROME ON BOTH SIDES: ICD-10-CM

## 2020-02-27 DIAGNOSIS — G56.02 LEFT CARPAL TUNNEL SYNDROME: Primary | ICD-10-CM

## 2020-02-27 PROCEDURE — 64721 CARPAL TUNNEL SURGERY: CPT | Mod: LT

## 2020-02-27 PROCEDURE — 64721 CARPAL TUNNEL SURGERY: CPT | Mod: LT | Performed by: ORTHOPAEDIC SURGERY

## 2020-02-27 PROCEDURE — G8907 PT DOC NO EVENTS ON DISCHARG: HCPCS

## 2020-02-27 PROCEDURE — G8918 PT W/O PREOP ORDER IV AB PRO: HCPCS

## 2020-02-27 PROCEDURE — G8916 PT W IV AB GIVEN ON TIME: HCPCS

## 2020-02-27 RX ORDER — ALBUTEROL SULFATE 0.83 MG/ML
2.5 SOLUTION RESPIRATORY (INHALATION) EVERY 4 HOURS PRN
Status: DISCONTINUED | OUTPATIENT
Start: 2020-02-27 | End: 2020-02-28 | Stop reason: HOSPADM

## 2020-02-27 RX ORDER — SODIUM CHLORIDE, SODIUM LACTATE, POTASSIUM CHLORIDE, CALCIUM CHLORIDE 600; 310; 30; 20 MG/100ML; MG/100ML; MG/100ML; MG/100ML
INJECTION, SOLUTION INTRAVENOUS CONTINUOUS
Status: DISCONTINUED | OUTPATIENT
Start: 2020-02-27 | End: 2020-02-28 | Stop reason: HOSPADM

## 2020-02-27 RX ORDER — ONDANSETRON 2 MG/ML
4 INJECTION INTRAMUSCULAR; INTRAVENOUS EVERY 30 MIN PRN
Status: DISCONTINUED | OUTPATIENT
Start: 2020-02-27 | End: 2020-02-28 | Stop reason: HOSPADM

## 2020-02-27 RX ORDER — PROPOFOL 10 MG/ML
INJECTION, EMULSION INTRAVENOUS CONTINUOUS PRN
Status: DISCONTINUED | OUTPATIENT
Start: 2020-02-27 | End: 2020-02-27

## 2020-02-27 RX ORDER — PROPOFOL 10 MG/ML
INJECTION, EMULSION INTRAVENOUS PRN
Status: DISCONTINUED | OUTPATIENT
Start: 2020-02-27 | End: 2020-02-27

## 2020-02-27 RX ORDER — CEFAZOLIN SODIUM 2 G/100ML
2 INJECTION, SOLUTION INTRAVENOUS
Status: COMPLETED | OUTPATIENT
Start: 2020-02-27 | End: 2020-02-27

## 2020-02-27 RX ORDER — KETOROLAC TROMETHAMINE 30 MG/ML
30 INJECTION, SOLUTION INTRAMUSCULAR; INTRAVENOUS EVERY 6 HOURS PRN
Status: DISCONTINUED | OUTPATIENT
Start: 2020-02-27 | End: 2020-02-28 | Stop reason: HOSPADM

## 2020-02-27 RX ORDER — CEFAZOLIN SODIUM 1 G/3ML
1 INJECTION, POWDER, FOR SOLUTION INTRAMUSCULAR; INTRAVENOUS SEE ADMIN INSTRUCTIONS
Status: DISCONTINUED | OUTPATIENT
Start: 2020-02-27 | End: 2020-02-28 | Stop reason: HOSPADM

## 2020-02-27 RX ORDER — HYDRALAZINE HYDROCHLORIDE 20 MG/ML
2.5-5 INJECTION INTRAMUSCULAR; INTRAVENOUS EVERY 10 MIN PRN
Status: DISCONTINUED | OUTPATIENT
Start: 2020-02-27 | End: 2020-02-28 | Stop reason: HOSPADM

## 2020-02-27 RX ORDER — PHYSOSTIGMINE SALICYLATE 1 MG/ML
1.2 INJECTION INTRAVENOUS
Status: DISCONTINUED | OUTPATIENT
Start: 2020-02-27 | End: 2020-02-28 | Stop reason: HOSPADM

## 2020-02-27 RX ORDER — LIDOCAINE 40 MG/G
CREAM TOPICAL
Status: DISCONTINUED | OUTPATIENT
Start: 2020-02-27 | End: 2020-02-28 | Stop reason: HOSPADM

## 2020-02-27 RX ORDER — DEXAMETHASONE SODIUM PHOSPHATE 4 MG/ML
4 INJECTION, SOLUTION INTRA-ARTICULAR; INTRALESIONAL; INTRAMUSCULAR; INTRAVENOUS; SOFT TISSUE EVERY 10 MIN PRN
Status: DISCONTINUED | OUTPATIENT
Start: 2020-02-27 | End: 2020-02-28 | Stop reason: HOSPADM

## 2020-02-27 RX ORDER — NALOXONE HYDROCHLORIDE 0.4 MG/ML
.1-.4 INJECTION, SOLUTION INTRAMUSCULAR; INTRAVENOUS; SUBCUTANEOUS
Status: DISCONTINUED | OUTPATIENT
Start: 2020-02-27 | End: 2020-02-28 | Stop reason: HOSPADM

## 2020-02-27 RX ORDER — METOPROLOL TARTRATE 1 MG/ML
1-2 INJECTION, SOLUTION INTRAVENOUS EVERY 5 MIN PRN
Status: DISCONTINUED | OUTPATIENT
Start: 2020-02-27 | End: 2020-02-28 | Stop reason: HOSPADM

## 2020-02-27 RX ORDER — GABAPENTIN 300 MG/1
300 CAPSULE ORAL ONCE
Status: COMPLETED | OUTPATIENT
Start: 2020-02-27 | End: 2020-02-27

## 2020-02-27 RX ORDER — ACETAMINOPHEN 325 MG/1
975 TABLET ORAL ONCE
Status: COMPLETED | OUTPATIENT
Start: 2020-02-27 | End: 2020-02-27

## 2020-02-27 RX ORDER — ACETAMINOPHEN 325 MG/1
650 TABLET ORAL
Status: DISCONTINUED | OUTPATIENT
Start: 2020-02-27 | End: 2020-02-28 | Stop reason: HOSPADM

## 2020-02-27 RX ORDER — ONDANSETRON 4 MG/1
4 TABLET, ORALLY DISINTEGRATING ORAL EVERY 30 MIN PRN
Status: DISCONTINUED | OUTPATIENT
Start: 2020-02-27 | End: 2020-02-28 | Stop reason: HOSPADM

## 2020-02-27 RX ADMIN — CEFAZOLIN SODIUM 2 G: 2 INJECTION, SOLUTION INTRAVENOUS at 09:45

## 2020-02-27 RX ADMIN — PROPOFOL 50 MG: 10 INJECTION, EMULSION INTRAVENOUS at 09:43

## 2020-02-27 RX ADMIN — PROPOFOL 50 MG: 10 INJECTION, EMULSION INTRAVENOUS at 09:44

## 2020-02-27 RX ADMIN — SODIUM CHLORIDE, SODIUM LACTATE, POTASSIUM CHLORIDE, CALCIUM CHLORIDE: 600; 310; 30; 20 INJECTION, SOLUTION INTRAVENOUS at 08:56

## 2020-02-27 RX ADMIN — PROPOFOL 50 MG: 10 INJECTION, EMULSION INTRAVENOUS at 09:45

## 2020-02-27 RX ADMIN — GABAPENTIN 300 MG: 300 CAPSULE ORAL at 08:54

## 2020-02-27 RX ADMIN — ACETAMINOPHEN 975 MG: 325 TABLET ORAL at 08:54

## 2020-02-27 RX ADMIN — PROPOFOL 100 MCG/KG/MIN: 10 INJECTION, EMULSION INTRAVENOUS at 09:45

## 2020-02-27 ASSESSMENT — LIFESTYLE VARIABLES: TOBACCO_USE: 1

## 2020-02-27 NOTE — BRIEF OP NOTE
[facility] Brief Operative Note    Pre-operative diagnosis: Hand numbness [R20.0]  Carpal tunnel syndrome on both sides [G56.03]   Post-operative diagnosis left carpal tunnel syndrome    Procedure: Procedure(s):  RELEASE, CARPAL TUNNEL, Left   Surgeon(s): Surgeon(s) and Role:     * Parish Lin MD - Primary     * Zackery Keene PA - Assisting   Estimated blood loss: 1mL   Specimens: none   Findings: Thick transverse carpal ligament.

## 2020-02-27 NOTE — DISCHARGE INSTRUCTIONS
1. Name: Parish Carter MRN #: [id number]  2. Date: 2/27/2020  Procedure: left carpal tunnel release.  3. Discharge to home when stable, tolerating clear liquids, and patient has urinated  4. Call for follow-up appointment, [phone], with Dr. Lin in:  2 weeks.   WOUND CARE    The bandage may be slightly bloody. This is normal.  5. Ice:  Keep an ice bag on your hand for 20 minutes at a time.  6. Keep incisions clean and dry following surgery for:  Not until follow-up .  7. Change all bandages in:  Not until follow-up. If splint becomes wet dirty or comfortable, ok to remove and cover wound with dry gauze and wrist brace.  8. If bandages are changed before follow-up, cover all incisions with fresh bandages or bandaids.  9. O.K. to shower (may get incision wet) in:  Not until sutures removed.  10. No tub baths, swimming pools, hot tubs, etc. for a minimum of 2 weeks following surgery  ACTIVITY  11. Keep hand elevated above heart at all times for the first few days after surgery, then as much as possible.  12. Weight-bearing ([place]):  Partial weight-bearing 30% in splint.     13. Exercises:  Perform exercises 3 times a day for a minimum of 25 reps each time (start today or tomorrow):             Finger range of motion   14. OK to drive:  Not for 24 hours    When going back to driving, be sure to test braking/acceleration maneuvers in an empty parking lot before entry into any traffic areas.      ABSOLUTELY NO DRIVING WHILE TAKING NARCOTICS!    DISCHARGE MEDICATIONS:   Tylenol 500 mg, 2 tablets, take every 8 hours as needed for pain (available over the counter)  Ibuprofen 200 mg, 3-4 tablets, take every 8 hours  as needed for pain (available over the counter)    Strong pain medication has been prescribed. Use as directed. Do not combine with alcohol. Be careful as you walk or climb stairs.   DIET:  If no nausea, clear liquids should be taken initially.  Then progress to solid foods when clear liquids are  tolerated.   RESPONSE TO SURGERY: It is normal to have pain and swelling in your hand after surgery. It may take 4 weeks or longer for the swelling to go away. It is also common to notice some bruising around the hand, wrist and forearm as the swelling resolves.  EMERGENCY: Call or return for any fevers (temperature greater than 101.5   or sustained fevers greater than 100.5   that haven t resolved within 3 to 4 days following surgery) or chills, increasing pain, swelling, redness, drainage (especially if yellow, green, or foul smelling), excessive bleeding), chest pain, shortness of breath:  Phone #: [phone]; If emergency, go to local ER or dial 911.    Parish Lin M.D., M.S.  Dept. of Orthopaedic Surgery  [facility]      2/27/2020        Exercises should be performed at least 3 times per day. Move in pain-free range.           Finger Exercises      Perform 1 set of 20 reps, each exercise, 3 times a day    Perform 1 rep every 4 seconds    Rest 1 minute between sets                                Elbow Flexion/Extension     Begin with arm at side, elbow straight, palm up    Bend elbow upward    Return to starting position    Perform 1 set of 20 reps, 3 times a day    Perform 1 rep every 4 seconds    Rest 1 minute between sets             [facility]  Same-Day Surgery   Adult Discharge Orders & Instructions   For 24 hours after surgery  1. Get plenty of rest.  A responsible adult must stay with you for at least 24 hours after you leave the hospital.   2. Do not drive or use heavy equipment.  If you have weakness or tingling, don't drive or use heavy equipment until this feeling goes away.  3. Do not drink alcohol.  4. Avoid strenuous or risky activities.  Ask for help when climbing stairs.   5. You may feel lightheaded.  IF so, sit for a few minutes before standing.  Have someone help you get up.   6. If you have nausea (feel sick to your stomach): Drink only clear  liquids such as apple juice, ginger ale, broth or 7-Up.  Rest may also help.  Be sure to drink enough fluids.  Move to a regular diet as you feel able.  7. You may have a slight fever. Call the doctor if your fever is over 100 F (37.7 C) (taken under the tongue) or lasts longer than 24 hours.  8. You may have a dry mouth, a sore throat, muscle aches or trouble sleeping.  These should go away after 24 hours.  9. Do not make important or legal decisions.   Call your doctor for any of the followin.  Signs of infection (fever, growing tenderness at the surgery site, a large amount of drainage or bleeding, severe pain, foul-smelling drainage, redness, swelling).    2. It has been over 8 to 10 hours since surgery and you are still not able to urinate (pass water).    3.  Headache for over 24 hours.    4.  Numbness, tingling or weakness the day after surgery (if you had spinal anesthesia).  To contact a doctor, call ___________________________

## 2020-02-27 NOTE — INTERVAL H&P NOTE
The History and Physical on patient's chart was personally reviewed today with the patient. there have been no interval changes in patient's history since H+P performed.    History:  Parish Carter is a 34 year old male , Left -hand dominant  With  Bilateral hand numbness and tingling and pain x 4 years.  The symptoms have been constant, and helped by night brace.  Has not tried a injections.  he has numbness and tingling in radial 4 digits.  Other symptoms include pain up arm. He's had problems with heroin in the past and was in recovery a few years ago and started to lift weights to help with his recovery. Started noticing numbness and tingling. He then relapsed over a year ago and while on heroin didn't seem to notice the symptoms. About 3 months ago he went sober again and started to workout at the gym with weights and now the symptoms are worsening. Symptoms worse right more than left. Numbness and tingling index and long fingers, sometimes the ring finger and never the small finger. The thumb is usually ok. Treatment with ice, movement, brace at night, 900mg gabapentin. He works as a  and doesn't bother so much during the day, but worse at end of day/night. Recently having more difficulties sleeping at night.    Had EMG 2/3/2020 at [facility]: severe left carpal tunnel syndrome. Moderate right carpal tunnel syndrome.    Patient elects to proceed with planned procedure. Left open carpal tunnel release. Outpatient. MAC with local. Over the counter pain control postoperative discussed and patient requested that as well given substance abuse history and on suboxone.    Risks and perceived benefits of surgery again discussed with patient. Patient's questions addressed and answered. Written informed consent obtained and reviewed. Surgical site marked with indelible marker with patient's participation after confirming site with patient.      Parish Lin M.D., M.S.  Dept. of Orthopaedic Surgery  [facility]

## 2020-02-27 NOTE — ANESTHESIA PREPROCEDURE EVALUATION
"Anesthesia Pre-Procedure Evaluation    Patient: Parish Carter   MRN:     [id number] Gender:   male   Age:    34 year old :      1985        Preoperative Diagnosis: Hand numbness [R20.0]  Carpal tunnel syndrome on both sides [G56.03]   Procedure(s):  RELEASE, CARPAL TUNNEL, Left     LABS:  CBC:   Lab Results   Component Value Date    WBC 10.7 2018    WBC 11.2 (H) 2017    HGB 14.6 2018    HGB 14.8 2017    HCT 44.0 2018    HCT 45.1 2017     2018     2017     BMP:   Lab Results   Component Value Date     2018     01/10/2016    POTASSIUM 4.2 2018    POTASSIUM 3.8 01/10/2016    CHLORIDE 109 2018    CHLORIDE 106 01/10/2016    CO2 25 2018    CO2 29 01/10/2016    BUN 26 2018    BUN 20 01/10/2016    CR 0.93 2018    CR 0.94 01/10/2016    GLC 82 2018     (H) 01/10/2016     COAGS: No results found for: PTT, INR, FIBR  POC: No results found for: BGM, HCG, HCGS  OTHER:   Lab Results   Component Value Date    LIZETT 8.6 2018    ALBUMIN 3.4 01/10/2016    PROTTOTAL 7.1 01/10/2016    ALT 25 01/10/2016    AST 9 01/10/2016    ALKPHOS 134 01/10/2016    BILITOTAL 0.2 01/10/2016    LIPASE 115 01/10/2016    TSH 0.52 2018        Preop Vitals    BP Readings from Last 3 Encounters:   20 123/78   20 119/75   20 126/80    Pulse Readings from Last 3 Encounters:   20 75   20 96   20 76      Resp Readings from Last 3 Encounters:   20 20   18 20   16 18    SpO2 Readings from Last 3 Encounters:   20 95%   20 95%   20 98%      Temp Readings from Last 1 Encounters:   20 98.5  F (36.9  C) (Temporal)    Ht Readings from Last 1 Encounters:   20 1.77 m (5' 9.69\")      Wt Readings from Last 1 Encounters:   20 110.2 kg (243 lb)    Estimated body mass index is 35.18 kg/m  as calculated from the following:    Height as of 20: 1.77 " "alondra (5' 9.69\").    Weight as of 2/17/20: 110.2 kg (243 lb).     LDA:  Peripheral IV 02/27/20 Left Hand (Active)   Site Assessment WDL 2/27/2020  8:49 AM   Line Status Infusing 2/27/2020  8:49 AM   Phlebitis Scale 0-->no symptoms 2/27/2020  8:49 AM   Infiltration Scale 0 2/27/2020  8:49 AM   Infiltration Site Treatment Method  None 2/27/2020  8:49 AM   Extravasation? No 2/27/2020  8:49 AM   Dressing Intervention New dressing  2/27/2020  8:49 AM   Number of days: 0        Past Medical History:   Diagnosis Date     Substance abuse (H)     IV Heroin for 2 1/2 years.      Tobacco abuse       History reviewed. No pertinent surgical history.   No Known Allergies     Anesthesia Evaluation     . Pt has had prior anesthetic.     No history of anesthetic complications          ROS/MED HX    ENT/Pulmonary:  - neg pulmonary ROS   (+)tobacco use, Current use , . .    Neurologic:  - neg neurologic ROS     Cardiovascular:  - neg cardiovascular ROS   (+) ----. : . . . :. . No previous cardiac testing       METS/Exercise Tolerance:  >4 METS   Hematologic:  - neg hematologic  ROS       Musculoskeletal:   (+)  other musculoskeletal- Carpal tunnel syndrome       GI/Hepatic:  - neg GI/hepatic ROS       Renal/Genitourinary:  - ROS Renal section negative       Endo:  - neg endo ROS       Psychiatric:  - neg psychiatric ROS       Infectious Disease:  - neg infectious disease ROS       Malignancy:      - no malignancy   Other: Comment: History of heroin abuse now on suboxone   (+) H/O chronic opiod use ,                        PHYSICAL EXAM:   Mental Status/Neuro: A/A/O   Airway: Facies: Feasible  Mallampati: I  Mouth/Opening: Full  TM distance: > 6 cm  Neck ROM: Full   Respiratory: Auscultation: CTAB     Resp. Rate: Normal     Resp. Effort: Normal      CV: Rhythm: Regular  Rate: Age appropriate  Heart: Normal Sounds  Edema: None   Comments:      Dental: Normal Dentition                Assessment:   ASA SCORE: 2    H&P: History and physical " reviewed and following examination; no interval change.   Smoking Status:  Non-Smoker/Unknown   NPO Status: NPO Appropriate     Plan:   Anes. Type:  MAC   Pre-Medication: Acetaminophen; Gabapentin   Induction:  N/a   Airway: Native Airway   Access/Monitoring: PIV   Maintenance: Propofol Sedation     Postop Plan:   Postop Pain: None  Postop Sedation/Airway: Not planned  Disposition: Outpatient     PONV Management:   Adult Risk Factors:, Non-Smoker   Prevention: Ondansetron, Propofol     CONSENT: Direct conversation   Plan and risks discussed with: Patient   Blood Products: Consent Deferred (Minimal Blood Loss)                   Cem Willoughby DO

## 2020-02-27 NOTE — ANESTHESIA POSTPROCEDURE EVALUATION
Anesthesia POST Procedure Evaluation    Patient: Parish Carter   MRN:     [id number] Gender:   male   Age:    34 year old :      1985        Preoperative Diagnosis: Hand numbness [R20.0]  Carpal tunnel syndrome on both sides [G56.03]   Procedure(s):  RELEASE, CARPAL TUNNEL, Left   Postop Comments: No value filed.     Anesthesia Type: MAC       Disposition: Outpatient   Postop Pain Control: Uneventful            Sign Out: Well controlled pain   PONV: No   Neuro/Psych: Uneventful            Sign Out: Acceptable/Baseline neuro status   Airway/Respiratory: Uneventful            Sign Out: Acceptable/Baseline resp. status   CV/Hemodynamics: Uneventful            Sign Out: Acceptable CV status   Other NRE: NONE   DID A NON-ROUTINE EVENT OCCUR? No         Last Anesthesia Record Vitals:  CRNA VITALS  2020 0945 - 2020 1045      2020             Pulse:  70    SpO2:  95 %          Last PACU Vitals:  Vitals Value Taken Time   /60 2020 10:18 AM   Temp 98.6  F (37  C) 2020 10:18 AM   Pulse     Resp 18 2020 10:18 AM   SpO2 96 % 2020 10:18 AM   Temp src     NIBP 96/45 2020 10:11 AM   Pulse 70 2020 10:14 AM   SpO2 95 % 2020 10:14 AM   Resp     Temp     Ht Rate     Temp 2           Electronically Signed By: Cem Willoughby DO, 2020, 11:09 AM

## 2020-02-27 NOTE — ANESTHESIA CARE TRANSFER NOTE
Patient: Parish Carter    Procedure(s):  RELEASE, CARPAL TUNNEL, Left    Diagnosis: Hand numbness [R20.0]  Carpal tunnel syndrome on both sides [G56.03]  Diagnosis Additional Information: No value filed.    Anesthesia Type:   MAC     Note:  Airway :Room Air  Patient transferred to:Phase II  Comments: At end of procedure, spontaneous respirations, patient alert to voice, able to follow commands. Patient breathing room air at to PACU. SpO2, NiBP alarms on and functioning, report on patient's clinical status given to PACU RN, RN questions answered.  Handoff Report: Identifed the Patient, Identified the Reponsible Provider, Reviewed the pertinent medical history, Discussed the surgical course, Reviewed Intra-OP anesthesia mangement and issues during anesthesia, Set expectations for post-procedure period and Allowed opportunity for questions and acknowledgement of understanding      Vitals: (Last set prior to Anesthesia Care Transfer)    CRNA VITALS  2/27/2020 0945 - 2/27/2020 1020      2/27/2020             Pulse:  70    SpO2:  95 %                Electronically Signed By: DAMIAN Mir CRNA  February 27, 2020  10:20 AM

## 2020-02-27 NOTE — OP NOTE
OPERATIVE REPORT    DATE OF SERVICE:  2/27/2020      PREOPERATIVE DIAGNOSIS  Left carpal tunnel syndrome.      POSTOPERATIVE DIAGNOSIS  Left carpal tunnel syndrome.      NAME OF OPERATION  Open left carpal tunnel release.     SURGEON  Parish Lin MD     FIRST ASSISTANT  Zackery Keene PA-C     ANESTHESIA: MAC with local.    ANTIBIOTICS: cefazolin 2g iv, prior to incision    IVF: 300 ml LR    ESTIMATED BLOOD LOSS: 1mL    FINDINGS: thick transverse carpal ligament, flattened median nerve, hyperemic changes of median nerve.    Tourniquet time: 8 minutes at 200 mmHg.    Complications: None apparent.    SPECIMENS: none    DRAINS: none    IMPLANTS: none      INDICATIONS  Parish Carter is a 34 year old male with severe left carpal tunnel syndrome, confirmed on EMG. The symptoms have been quite bothersome.  Conservative treatment has failed, including night splints, therapy, NSAIDs and injections. Risks of surgery, including but not limited to: bleeding, infection, pain, scar, damage to adjacent structures (nerves, vessels), temporary vs permanent nerve damage, failure to relieve symptoms, recurrence of symptoms, need for further surgery, risks of anesthesia, and death were discussed. All questions were addressed to patient satisfaction. The patient elected to proceed with this surgery understanding the risks and perceived benefits. Informed consent was obtained for the procedure.       PROCEDURE IN DETAIL  The patient was identified in the preoperative holding area. The correct procedure and procedural site was confirmed with the patient. Consent was reviewed. The correct surgical site was marked with an indelible marker by the attending surgeon, Parish Lin MD.  The patient was then taken to the operating room and placed on the operating table in the supine position.  Tourniquet was placed around the proximal arm. All bony prominences well padded. The limb was prepped and draped in the usual sterile fashion.  IV  sedation was given by Anesthesia.  Local anesthetic using a combination of 0.25% Marcaine and 1% lidocaine was injected in the anticipated incision site.  The limb was exsanguinated and tourniquet inflated to 200 mmHg.    A longitudinal incision was made in the usual location at the base of the palm just ulnar to midline and the palmaris longus.  The incision was taken down through the skin and subcutaneous tissue carefully to the level of the palmar fascia.  The palmar fascia was then carefully incised, and the transverse carpal ligament was then exposed. The transverse carpal ligament was incised from distal to proximal under direct vision while protecting underlying structures with an elevator.  The transverse carpal ligament was tight and thick.  Proximally, we protected the underlying structures using a clamp, and then completed the ligament incision using a tenotomy scissors.  The distal volar forearm fascia was also incised longitudinally for a segment of approximately 3cm. The median nerve was completely exposed and was noted to have flattened, hyperemic appearance. Once I felt that the transverse carpal ligament was released completely, I used my small finger as a probe to make sure that it was in fact completely released, as it was. The wound was copiously irrigated with normal saline and the tourniquet deflated.  Hemostasis was achieved with electrocautery.  Then, 4-0 nylon sutures placed in a horizontal mattress fashion were used to close the skin, and a sterile dressing was applied followed by a volar splint. The patient was then transferred to the hospital cart and to the recovery area in stable condition. There were no apparent complications.    Postop plan will be partial weight-bearing of left upper extremity. Splint to be in place at all times until follow-up in 10-14 days. Oral pain medications will be provided. Elevation of left upper extremity at all times to reduce swelling. Finger range of  motion.    Parish Lin M.D., M.S.  Dept. of Orthopaedic Surgery  [facility]

## 2020-03-18 ENCOUNTER — OFFICE VISIT (OUTPATIENT)
Dept: ORTHOPEDICS | Facility: CLINIC | Age: 35
End: 2020-03-18
Payer: COMMERCIAL

## 2020-03-18 VITALS
DIASTOLIC BLOOD PRESSURE: 89 MMHG | TEMPERATURE: 98.1 F | HEART RATE: 76 BPM | OXYGEN SATURATION: 96 % | HEIGHT: 71 IN | WEIGHT: 237.4 LBS | BODY MASS INDEX: 33.23 KG/M2 | SYSTOLIC BLOOD PRESSURE: 125 MMHG

## 2020-03-18 DIAGNOSIS — Z98.890 S/P CARPAL TUNNEL RELEASE: Primary | ICD-10-CM

## 2020-03-18 PROCEDURE — 99024 POSTOP FOLLOW-UP VISIT: CPT | Performed by: ORTHOPAEDIC SURGERY

## 2020-03-18 ASSESSMENT — MIFFLIN-ST. JEOR: SCORE: 2028.97

## 2020-03-18 NOTE — PROGRESS NOTES
"Chief Complaint   Patient presents with     Left Hand - Surgical Followup     Cts     Surgery for CPS on 2/27 on left hand. Pt states he is doing good. 3 wk PO       SURGERY: left carpal tunnel release.  DATE OF SURGERY: 2/27/2020      HPI: Parish Carter is a 34 year old male , right -hand dominant, who presents for post-surgical follow-up of a left carpal tunnel release. It has now been 3 weeks. since surgery. He has remained in the splint. Denies fevers, chills or night sweats. There have been no wound problems. He has been doing active/passive finger range of motion exercises. He reports having mild pain/discomfort around the surgical site. He states that since surgery, preop symptoms have resolved.    He'd like to do left carpal tunnel release surgery once he is able.       PAST MEDICAL HISTORY:   Past Medical History:   Diagnosis Date     Substance abuse (H)     IV Heroin for 2 1/2 years.      Tobacco abuse        PAST SURGICAL HISTORY:   Past Surgical History:   Procedure Laterality Date     RELEASE CARPAL TUNNEL Left 2/27/2020    Procedure: RELEASE, CARPAL TUNNEL, Left;  Surgeon: Parish Lin MD;  Location: [facility] OR       MEDICATIONS:    Current Outpatient Medications   Medication Sig Dispense Refill     buprenorphine HCl-naloxone HCl (SUBOXONE) 8-2 MG per film TK 1 FILM SUBLIGUALLY BID  0        ALLERGIES: No Known Allergies      PHYSICAL EXAM  GENERAL APPEARANCE: healthy, alert, no distress.   SKIN: no suspicious lesions or rashes  RESPIRATORY: No increased work of breathing.  NEURO: Normal strength and tone, mentation intact and speech normal  PSYCH:  mentation appears normal and affect normal/bright. Not anxious.        /89 (BP Location: Left arm, Patient Position: Sitting, Cuff Size: Adult Large)   Pulse 76   Temp 98.1  F (36.7  C) (Oral)   Ht 1.803 m (5' 11\")   Wt 107.7 kg (237 lb 6.4 oz)   SpO2 96%   BMI 33.11 kg/m       HAND / WRIST EXAM:    The splint was removed.  Volar incision " healing well with skin edges well approximated and everted.  Sutures in place.  No erythema. No drainage.    There is no swelling in the palm, incisional area.  There is mild tenderness in the incisional area of the palm.  There is minimal resolving ecchymosis.  There is no erythema of the surrounding skin.  There is no maceration of the skin.  There is no deformity in the area.  Range of motion: normal and (any)movements are not painful.    Intact sensation to light touch in the distribution of the median, radial, and ulnar nerves of the hand. Intact sensation of the radial and ulnar digital nerves of the thumb, index, long (middle), ring and small finger.  Brisk capillary refill to all fingers, warm and well-perfused.   Palpable radial pulse.    ASSESSMENT: 34 year old male, 3 weeks status post left carpal tunnel release , doing well.      PLAN: routine postoperative of carpal tunnel release.    Remove sutures today, soft dressing applied  May wash hands, no aggressive scrubbing for another week  Continue with hand and wrist exercises for motion.  Scar massage and desensitization discussed and demonstrated.  Gradual return to light use of hands for ADLs. No aggressive hand use for another 4 weeks.  Return to clinic 4 weeks for clinical exam, sooner as needed.  It may take 6 months or longer for symptoms to resolve, and in cases of severe carpal tunnel syndrome, symptoms may not completely improve.    Will arrange for right open carpal tunnel release in future at some time down the road. He will let us know.    Parish Lin M.D., M.S.  Dept. of Orthopaedic Surgery  [facility]

## 2020-03-18 NOTE — LETTER
3/18/2020         RE: Parish Carter  [address] [address]        Dear Colleague,    Thank you for referring your patient, Parish Carter, to the [facility]. Please see a copy of my visit note below.    Chief Complaint   Patient presents with     Left Hand - Surgical Followup     Cts     Surgery for CPS on 2/27 on left hand. Pt states he is doing good. 3 wk PO       SURGERY: left carpal tunnel release.  DATE OF SURGERY: 2/27/2020      HPI: Parish Carter is a 34 year old male , right -hand dominant, who presents for post-surgical follow-up of a left carpal tunnel release. It has now been 3 weeks. since surgery. He has remained in the splint. Denies fevers, chills or night sweats. There have been no wound problems. He has been doing active/passive finger range of motion exercises. He reports having mild pain/discomfort around the surgical site. He states that since surgery, preop symptoms have resolved.    He'd like to do left carpal tunnel release surgery once he is able.       PAST MEDICAL HISTORY:   Past Medical History:   Diagnosis Date     Substance abuse (H)     IV Heroin for 2 1/2 years.      Tobacco abuse        PAST SURGICAL HISTORY:   Past Surgical History:   Procedure Laterality Date     RELEASE CARPAL TUNNEL Left 2/27/2020    Procedure: RELEASE, CARPAL TUNNEL, Left;  Surgeon: Parish Lin MD;  Location: [facility] OR       MEDICATIONS:    Current Outpatient Medications   Medication Sig Dispense Refill     buprenorphine HCl-naloxone HCl (SUBOXONE) 8-2 MG per film TK 1 FILM SUBLIGUALLY BID  0        ALLERGIES: No Known Allergies      PHYSICAL EXAM  GENERAL APPEARANCE: healthy, alert, no distress.   SKIN: no suspicious lesions or rashes  RESPIRATORY: No increased work of breathing.  NEURO: Normal strength and tone, mentation intact and speech normal  PSYCH:  mentation appears normal and affect normal/bright. Not anxious.        /89 (BP Location: Left arm, Patient  "Position: Sitting, Cuff Size: Adult Large)   Pulse 76   Temp 98.1  F (36.7  C) (Oral)   Ht 1.803 m (5' 11\")   Wt 107.7 kg (237 lb 6.4 oz)   SpO2 96%   BMI 33.11 kg/m       HAND / WRIST EXAM:    The splint was removed.  Volar incision healing well with skin edges well approximated and everted.  Sutures in place.  No erythema. No drainage.    There is no swelling in the palm, incisional area.  There is mild tenderness in the incisional area of the palm.  There is minimal resolving ecchymosis.  There is no erythema of the surrounding skin.  There is no maceration of the skin.  There is no deformity in the area.  Range of motion: normal and (any)movements are not painful.    Intact sensation to light touch in the distribution of the median, radial, and ulnar nerves of the hand. Intact sensation of the radial and ulnar digital nerves of the thumb, index, long (middle), ring and small finger.  Brisk capillary refill to all fingers, warm and well-perfused.   Palpable radial pulse.    ASSESSMENT: 34 year old male, 3 weeks status post left carpal tunnel release , doing well.      PLAN: routine postoperative of carpal tunnel release.    Remove sutures today, soft dressing applied  May wash hands, no aggressive scrubbing for another week  Continue with hand and wrist exercises for motion.  Scar massage and desensitization discussed and demonstrated.  Gradual return to light use of hands for ADLs. No aggressive hand use for another 4 weeks.  Return to clinic 4 weeks for clinical exam, sooner as needed.  It may take 6 months or longer for symptoms to resolve, and in cases of severe carpal tunnel syndrome, symptoms may not completely improve.    Will arrange for right open carpal tunnel release in future at some time down the road. He will let us know.    Parish Lin M.D., M.S.  Dept. of Orthopaedic Surgery  [facility]    Again, thank you for allowing me to participate in the care of your patient.  "       Sincerely,        Parish Lin MD

## 2020-06-30 ENCOUNTER — VIRTUAL VISIT (OUTPATIENT)
Dept: FAMILY MEDICINE | Facility: CLINIC | Age: 35
End: 2020-06-30
Payer: COMMERCIAL

## 2020-06-30 DIAGNOSIS — F11.11 HISTORY OF HEROIN ABUSE (H): Primary | ICD-10-CM

## 2020-06-30 PROCEDURE — 99213 OFFICE O/P EST LOW 20 MIN: CPT | Mod: 95 | Performed by: PHYSICIAN ASSISTANT

## 2020-06-30 RX ORDER — TRAZODONE HYDROCHLORIDE 50 MG/1
50 TABLET, FILM COATED ORAL AT BEDTIME
Qty: 30 TABLET | Refills: 1 | Status: SHIPPED | OUTPATIENT
Start: 2020-06-30 | End: 2024-02-22

## 2020-06-30 RX ORDER — ONDANSETRON 4 MG/1
4 TABLET, FILM COATED ORAL EVERY 8 HOURS PRN
Qty: 18 TABLET | Refills: 1 | Status: SHIPPED | OUTPATIENT
Start: 2020-06-30 | End: 2024-02-22

## 2020-06-30 RX ORDER — CLONIDINE HYDROCHLORIDE 0.2 MG/1
0.2 TABLET ORAL 2 TIMES DAILY
Qty: 60 TABLET | Refills: 0 | Status: SHIPPED | OUTPATIENT
Start: 2020-06-30 | End: 2024-02-22

## 2020-06-30 NOTE — PROGRESS NOTES
"Parish Carter is a 34 year old male who is being evaluated via a billable telephone visit.      The patient has been notified of following:     \"This telephone visit will be conducted via a call between you and your physician/provider. We have found that certain health care needs can be provided without the need for a physical exam.  This service lets us provide the care you need with a short phone conversation.  If a prescription is necessary we can send it directly to your pharmacy.  If lab work is needed we can place an order for that and you can then stop by our lab to have the test done at a later time.    Telephone visits are billed at different rates depending on your insurance coverage. During this emergency period, for some insurers they may be billed the same as an in-person visit.  Please reach out to your insurance provider with any questions.    If during the course of the call the physician/provider feels a telephone visit is not appropriate, you will not be charged for this service.\"    Patient has given verbal consent for Telephone visit?  Yes    What phone number would you like to be contacted at? [phone]     How would you like to obtain your AVS? Mail a copy    Subjective     Parish Carter is a 34 year old male who presents via phone visit today for the following health issues:    HPI  Medication Followup of  Suboxone -tapering off  , requesting Clonidine and Zofran. Also would like to get trazodone     Taking Medication as prescribed: yes (tapering off)    Side Effects:  None    Medication Helping Symptoms:  yes   sts is managing his suboxone currently.  Doesn't like how he feels on it.  The clinic wanted to increase dose or go to methaodone.  He had one mess up.  Currently not using.  Is going to NA meeting weekly.  Has done this before and was clean for 3 years.  Feels good about staying sober after getting off suboxone.  Going to start working out and he feels he knows what to do.  Has " already tapering off.    Currently not having trouble sleeping but has in the past when tapering off.  Down to 4 mg daily now.  Stopping on Friday.  Last time withdrawals lasted about a week.  Can manage blood pressure at home.  Has his aunt and gf to help monitor                Patient Active Problem List   Diagnosis     History of heroin abuse (H)     Hand numbness     Carpal tunnel syndrome on both sides     Tobacco abuse     Past Surgical History:   Procedure Laterality Date     RELEASE CARPAL TUNNEL Left 2/27/2020    Procedure: RELEASE, CARPAL TUNNEL, Left;  Surgeon: Parish Lin MD;  Location:  OR       Social History     Tobacco Use     Smoking status: Light Tobacco Smoker     Packs/day: 0.25     Years: 15.00     Pack years: 3.75     Types: Cigarettes     Smokeless tobacco: Never Used     Tobacco comment: Trying to quit.   Substance Use Topics     Alcohol use: No     History reviewed. No pertinent family history.        Reviewed and updated as needed this visit by Provider         Review of Systems   As above       Objective   Reported vitals:  There were no vitals taken for this visit.   healthy, alert and no distress  PSYCH: Alert and oriented times 3; coherent speech, normal   rate and volume, able to articulate logical thoughts, able   to abstract reason, no tangential thoughts, no hallucinations   or delusions  His affect is normal  RESP: No cough, no audible wheezing, able to talk in full sentences  Remainder of exam unable to be completed due to telephone visits    Diagnostic Test Results:  none         Assessment/Plan:  1. History of heroin abuse (H)  Is going to taper off suboxone.  Has done before and feels comfortable doing this.  Feeling good about not using but will follow up in one week  - cloNIDine (CATAPRES) 0.2 MG tablet; Take 1 tablet (0.2 mg) by mouth 2 times daily  Dispense: 60 tablet; Refill: 0  - ondansetron (ZOFRAN) 4 MG tablet; Take 1 tablet (4 mg) by mouth every 8 hours as  needed for nausea  Dispense: 18 tablet; Refill: 1  - traZODone (DESYREL) 50 MG tablet; Take 1 tablet (50 mg) by mouth At Bedtime  Dispense: 30 tablet; Refill: 1    No follow-ups on file.      Phone call duration:  5:49-5:58   9 minutes    Funmilayo Sullivan PA-C

## 2020-09-29 ENCOUNTER — VIRTUAL VISIT (OUTPATIENT)
Dept: FAMILY MEDICINE | Facility: CLINIC | Age: 35
End: 2020-09-29
Payer: COMMERCIAL

## 2020-09-29 DIAGNOSIS — H92.01 OTALGIA, RIGHT: Primary | ICD-10-CM

## 2020-09-29 PROCEDURE — 99213 OFFICE O/P EST LOW 20 MIN: CPT | Mod: 95 | Performed by: PHYSICIAN ASSISTANT

## 2020-09-29 RX ORDER — IBUPROFEN 600 MG/1
600 TABLET, FILM COATED ORAL EVERY 6 HOURS PRN
Qty: 60 TABLET | Refills: 0 | Status: SHIPPED | OUTPATIENT
Start: 2020-09-29 | End: 2024-02-22

## 2020-09-29 RX ORDER — FLUTICASONE PROPIONATE 50 MCG
1 SPRAY, SUSPENSION (ML) NASAL DAILY
Qty: 16 G | Refills: 0 | Status: SHIPPED | OUTPATIENT
Start: 2020-09-29 | End: 2020-10-06

## 2020-09-29 RX ORDER — AMOXICILLIN 875 MG
875 TABLET ORAL 2 TIMES DAILY
Qty: 20 TABLET | Refills: 0 | Status: SHIPPED | OUTPATIENT
Start: 2020-09-29 | End: 2020-10-09

## 2020-09-29 NOTE — PROGRESS NOTES
"Parish Carter is a 35 year old male who is being evaluated via a billable telephone visit.      The patient has been notified of following:     \"This telephone visit will be conducted via a call between you and your physician/provider. We have found that certain health care needs can be provided without the need for a physical exam.  This service lets us provide the care you need with a short phone conversation.  If a prescription is necessary we can send it directly to your pharmacy.  If lab work is needed we can place an order for that and you can then stop by our lab to have the test done at a later time.    Telephone visits are billed at different rates depending on your insurance coverage. During this emergency period, for some insurers they may be billed the same as an in-person visit.  Please reach out to your insurance provider with any questions.    If during the course of the call the physician/provider feels a telephone visit is not appropriate, you will not be charged for this service.\"    Patient has given verbal consent for Telephone visit?  Yes    What phone number would you like to be contacted at? [phone]    How would you like to obtain your AVS? Mail a copy DECLINED    Subjective     Parish Carter is a 35 year old male who presents via phone visit today for the following health issues:    HPI    Concern - Ear pain  Onset: 2 days ago  Description: Right ear  Intensity: moderate, severe  Progression of Symptoms:  Constant pressure inside ear drum and feels like it is moving into his sinuses  Accompanying Signs & Symptoms: Headache  Previous history of similar problem: Yes, a couple years ago  Precipitating factors:        Worsened by: None  Alleviating factors:        Improved by: Tylenol helping a little bit, tried to clean out ears yesterday and had excess wax  Therapies tried and outcome: Tylenol     Started 2 days ago, had a HA right behind his right ear. Now with a throbbing that isn't " stopping. Pressure in his head.   He tried cleaning his ears last night small amount of wax, no other drainage.   No fevers.No swollen glands.   No changes in hearing. No tinnitus.   No dizziness.   Tried some tylenol, dulled the pain slightly.       Would like antibiotics.  Maki Nieto MA          Review of Systems   Constitutional, HEENT, cardiovascular, pulmonary, gi and gu systems are negative, except as otherwise noted.       Objective          Vitals:  No vitals were obtained today due to virtual visit.    alert and no distress  PSYCH: Alert and oriented times 3; coherent speech, normal   rate and volume, able to articulate logical thoughts, able   to abstract reason, no tangential thoughts, no hallucinations   or delusions  His affect is normal  RESP: No cough, no audible wheezing, able to talk in full sentences  Remainder of exam unable to be completed due to telephone visits          Assessment/Plan:    Assessment & Plan       Otalgia, right  Uncertain diagnosis without exam, but history consistent with possible AOM. Will treat with amoxicillin and ibuprofen. Trial of flonase as well. Patient should be seen in clinic in 3-5 days if not improving.   - ibuprofen (ADVIL/MOTRIN) 600 MG tablet; Take 1 tablet (600 mg) by mouth every 6 hours as needed for moderate pain  - amoxicillin (AMOXIL) 875 MG tablet; Take 1 tablet (875 mg) by mouth 2 times daily for 10 days  - fluticasone (FLONASE) 50 MCG/ACT nasal spray; Spray 1 spray into both nostrils daily for 7 days     Tobacco Cessation:   reports that he has been smoking cigarettes. He has a 3.75 pack-year smoking history. He has never used smokeless tobacco.        No follow-ups on file.    Phuong Stroud PA-C  [facility]    Phone call duration:  5 minutes

## 2024-02-22 ENCOUNTER — OFFICE VISIT (OUTPATIENT)
Dept: FAMILY MEDICINE | Facility: CLINIC | Age: 39
End: 2024-02-22
Payer: COMMERCIAL

## 2024-02-22 VITALS
BODY MASS INDEX: 29.84 KG/M2 | HEART RATE: 88 BPM | TEMPERATURE: 98.8 F | WEIGHT: 208.4 LBS | DIASTOLIC BLOOD PRESSURE: 72 MMHG | RESPIRATION RATE: 16 BRPM | HEIGHT: 70 IN | SYSTOLIC BLOOD PRESSURE: 118 MMHG | OXYGEN SATURATION: 96 %

## 2024-02-22 DIAGNOSIS — K92.1 BLOOD IN STOOL: ICD-10-CM

## 2024-02-22 DIAGNOSIS — Z13.220 ENCOUNTER FOR LIPID SCREENING FOR CARDIOVASCULAR DISEASE: ICD-10-CM

## 2024-02-22 DIAGNOSIS — Z00.00 WELL ADULT EXAM: Primary | ICD-10-CM

## 2024-02-22 DIAGNOSIS — Z13.6 ENCOUNTER FOR LIPID SCREENING FOR CARDIOVASCULAR DISEASE: ICD-10-CM

## 2024-02-22 DIAGNOSIS — K59.09 CHRONIC CONSTIPATION: ICD-10-CM

## 2024-02-22 DIAGNOSIS — N13.9 URINARY OBSTRUCTION: ICD-10-CM

## 2024-02-22 DIAGNOSIS — Z12.5 SCREENING FOR PROSTATE CANCER: ICD-10-CM

## 2024-02-22 PROCEDURE — 99213 OFFICE O/P EST LOW 20 MIN: CPT | Mod: 25 | Performed by: FAMILY MEDICINE

## 2024-02-22 PROCEDURE — 99386 PREV VISIT NEW AGE 40-64: CPT | Performed by: FAMILY MEDICINE

## 2024-02-22 SDOH — HEALTH STABILITY: PHYSICAL HEALTH: ON AVERAGE, HOW MANY DAYS PER WEEK DO YOU ENGAGE IN MODERATE TO STRENUOUS EXERCISE (LIKE A BRISK WALK)?: 3 DAYS

## 2024-02-22 ASSESSMENT — SOCIAL DETERMINANTS OF HEALTH (SDOH): HOW OFTEN DO YOU GET TOGETHER WITH FRIENDS OR RELATIVES?: MORE THAN THREE TIMES A WEEK

## 2024-02-22 NOTE — PROGRESS NOTES
"Preventive Care Visit  [facility] ALEXYS Lo MD, Family Medicine  Feb 22, 2024    Assessment & Plan       ICD-10-CM    1. Well adult exam  Z00.00 Comprehensive metabolic panel     Hemoglobin A1c     Lipid panel reflex to direct LDL Fasting     Prostate Specific Antigen Screen      2. Chronic constipation  K59.09 Adult GI  Referral - Consult Only     Adult GI  Referral - Procedure Only      3. Blood in stool  K92.1 Adult GI  Referral - Consult Only     Adult GI  Referral - Procedure Only      4. Encounter for lipid screening for cardiovascular disease  Z13.220 Lipid panel reflex to direct LDL Fasting    Z13.6       5. Screening for prostate cancer  Z12.5 Prostate Specific Antigen Screen      6. Urinary obstruction  N13.9 Adult Urology  Referral            Patient has been advised of split billing requirements and indicates understanding: Yes  Review of external notes as documented elsewhere in note        Nicotine/Tobacco Cessation  He reports that he has been smoking cigarettes. He has a 3.8 pack-year smoking history. He has never used smokeless tobacco.  Nicotine/Tobacco Cessation Plan  Information offered: Patient not interested at this time      BMI  Estimated body mass index is 30 kg/m  as calculated from the following:    Height as of this encounter: 1.775 m (5' 9.88\").    Weight as of this encounter: 94.5 kg (208 lb 6.4 oz).   Weight management plan: Discussed healthy diet and exercise guidelines    Counseling  Appropriate preventive services were discussed with this patient, including applicable screening as appropriate for fall prevention, nutrition, physical activity, Tobacco-use cessation, weight loss and cognition.  Checklist reviewing preventive services available has been given to the patient.      There are no Patient Instructions on file for this visit.    Sudeep Lugo is a 38 year old, presenting for the " following:  Physical and Patient Request (Discuss constipation and urination concerns. Wondering if it could be prostate issues?)        2/22/2024     3:15 PM   Additional Questions   Roomed by Shaina   Accompanied by britt         2/22/2024     3:15 PM   Patient Reported Additional Medications   Patient reports taking the following new medications none        Health Care Directive  Patient does not have a Health Care Directive or Living Will: Discussed advance care planning with patient; however, patient declined at this time.    HPI      Wt Readings from Last 4 Encounters:   02/22/24 94.5 kg (208 lb 6.4 oz)   03/18/20 107.7 kg (237 lb 6.4 oz)   02/17/20 110.2 kg (243 lb)   01/22/20 113.5 kg (250 lb 3.2 oz)     BP Readings from Last 6 Encounters:   02/22/24 118/72   03/18/20 125/89   02/27/20 112/67   02/17/20 119/75   01/22/20 126/80   01/14/20 126/76     Chronic constipation  Intermittent blood in stool                      2/22/2024   General Health   How would you rate your overall physical health? (!) FAIR   Feel stress (tense, anxious, or unable to sleep) To some extent   (!) STRESS CONCERN      2/22/2024   Nutrition   Three or more servings of calcium each day? Yes   Diet: Regular (no restrictions)   How many servings of fruit and vegetables per day? (!) 2-3   How many sweetened beverages each day? 0-1         2/22/2024   Exercise   Days per week of moderate/strenous exercise 3 days         2/22/2024   Social Factors   Frequency of gathering with friends or relatives More than three times a week   Worry food won't last until get money to buy more Yes   Food not last or not have enough money for food? No   Do you have housing?  Yes   Are you worried about losing your housing? No   Lack of transportation? No   Unable to get utilities (heat,electricity)? No   (!) FOOD SECURITY CONCERN PRESENT      2/22/2024   Dental   Dentist two times every year? (!) NO         2/22/2024   TB Screening   Were you born outside  "of [place]?  No         Today's PHQ-2 Score:       2/22/2024     3:10 PM   PHQ-2 ( 1999 [employer])   Q1: Little interest or pleasure in doing things 0   Q2: Feeling down, depressed or hopeless 1   PHQ-2 Score 1   Q1: Little interest or pleasure in doing things Not at all   Q2: Feeling down, depressed or hopeless Several days   PHQ-2 Score 1           2/22/2024   Substance Use   Alcohol more than 3/day or more than 7/wk No   Do you use any other substances recreationally? (!) CANNABIS PRODUCTS     Social History     Tobacco Use    Smoking status: Light Smoker     Packs/day: 0.25     Years: 15.00     Additional pack years: 0.00     Total pack years: 3.75     Types: Cigarettes    Smokeless tobacco: Never    Tobacco comments:     Trying to quit.   Substance Use Topics    Alcohol use: No    Drug use: No             2/22/2024   One time HIV Screening   Previous HIV test? Yes         2/22/2024   STI Screening   New sexual partner(s) since last STI/HIV test? No   ASCVD Risk   The ASCVD Risk score (Frankie NICOLE, et al., 2019) failed to calculate for the following reasons:    Cannot find a previous HDL lab    Cannot find a previous total cholesterol lab        2/22/2024   Contraception/Family Planning   Questions about contraception or family planning No        Reviewed and updated as needed this visit by Provider                             Objective    Exam  /72   Pulse 88   Temp 98.8  F (37.1  C) (Temporal)   Resp 16   Ht 1.775 m (5' 9.88\")   Wt 94.5 kg (208 lb 6.4 oz)   SpO2 96%   BMI 30.00 kg/m     Estimated body mass index is 30 kg/m  as calculated from the following:    Height as of this encounter: 1.775 m (5' 9.88\").    Weight as of this encounter: 94.5 kg (208 lb 6.4 oz).    Physical Exam  Vitals reviewed.   Constitutional:       Appearance: Normal appearance. He is not ill-appearing.   HENT:      Head: Normocephalic.      Right Ear: Tympanic membrane and ear canal normal.      Left Ear: Tympanic membrane " and ear canal normal.      Nose: Nose normal.   Eyes:      Extraocular Movements: Extraocular movements intact.   Cardiovascular:      Rate and Rhythm: Normal rate and regular rhythm.      Heart sounds: Normal heart sounds. No murmur heard.  Pulmonary:      Effort: Pulmonary effort is normal. No respiratory distress.      Breath sounds: Normal breath sounds. No wheezing or rales.   Abdominal:      Palpations: Abdomen is soft.   Musculoskeletal:         General: Normal range of motion.      Cervical back: Normal range of motion and neck supple.   Skin:     General: Skin is warm and dry.      Findings: No lesion.   Neurological:      Mental Status: He is alert and oriented to person, place, and time.   Psychiatric:         Mood and Affect: Mood normal.         Behavior: Behavior normal.         Thought Content: Thought content normal.         Judgment: Judgment normal.             Signed Electronically by: Bulmaro Lo MD

## 2024-02-22 NOTE — COMMUNITY RESOURCES LIST (ENGLISH)
02/22/2024   [facility]  N/A  For questions about this resource list or additional care needs, please contact your primary care clinic or care manager.  Phone: [phone]   Email: N/A   Address: [address] [place] [id number]   Hours: N/A        Food and Nutrition       Food pantry  1  [employer] [facility] [employer] Distance: 1.56 miles      Pickup   [address] [place] [address]  Language: English, Hungarian  Hours: Mon - Tue 8:00 AM - 4:30 PM , Wed 10:00 AM - 6:30 PM , Thu 8:00 AM - 4:30 PM  Fees: Free   Phone: (405) 473-7544 Email: josetobias@[employer] Website: http://www.[employer].org/     2  [facility] Distance: 1.9 miles      In-Person   [address] [place] [address]  Language: English  Hours: Wed 5:30 PM - 6:30 PM  Fees: Free   Phone: (400) 303-3190 Email: info@[facility].org Website: https://www.[facility]Rhode [facility] Home[facility] HospitalPlay[facility]ight.org/     SNAP application assistance  3  [facility] - SNAP Ed - SNAP Ed - SNAP application assistance Distance: 3.52 miles      In-Person   [address] [place] [address]  Language: English, Hmong, Oromo, Ugandan, Hungarian  Hours: Mon - Fri 9:00 AM - 5:00 PM Appt. Only  Fees: Free   Phone: (749) 638-5959 Email: [email or web address] Website: https://extension.[facility]/nutrition-education/snap-ed-educational-offerings     4  [facility] Distance: 3.9 miles      Phone/Virtual   [address] [place] [address]  Language: English, Ugandan  Hours: Mon - Fri 9:00 AM - 8:00 PM  Fees: Free   Phone: (967) 313-9356 Website: https://www.[facility].org/atul-kevin     Soup kitchen or free meals  5  [facility] Distance: 1.36 miles      Pickup   [address] [place] [address]  Language: English  Hours: Mon 5:00 PM - 5:45 PM  Fees: Free   Phone: (383) 911-6891 Email: office@[facility]  Website: http://www.[facility].org     6  [facility] Distance: 2.75 miles      Pick   [address] [place] [address]  Language: English  Hours: Tue 5:00 PM - 6:30 PM  Fees: Free   Phone: (209) 795-7079 Email: admin@[facility].[employer] Website: http://www.[facility].[employer]/          Important Numbers & Websites       Emergency Services   911  [facility] Services   311  Poison Control   (803) 511-8348  Suicide Prevention Lifeline   (249) 233-2945 (TALK)  Child Abuse Hotline   (758) 643-8311 (4-A-Child)  Sexual Assault Hotline   (692) 590-1997 (HOPE)  National Runaway Safeline   (146) 190-3283 (RUNAWAY)  All-Options Talkline   (505) 892-5380  Substance Abuse Referral   (637) 302-7182 (HELP)

## 2024-03-05 ENCOUNTER — TELEPHONE (OUTPATIENT)
Dept: GASTROENTEROLOGY | Facility: CLINIC | Age: 39
End: 2024-03-05
Payer: COMMERCIAL

## 2024-03-05 DIAGNOSIS — Z12.11 SPECIAL SCREENING FOR MALIGNANT NEOPLASMS, COLON: Primary | ICD-10-CM

## 2024-03-05 NOTE — LETTER
April 5, 2024      Parish Carter  [address] [address]              Dear Janay Lugo Extended Colonoscopy Prep  Prep instructions for colonoscopy   Pre-Assessment Phone Number: [facility]; [phone] option 4  Bowel prep has been sent to    [id number] - [place], [place] - [address] AT [facility] [address]    Please read these instructions carefully at least 7 days prior to your colonoscopy procedure. Be sure to follow all directions completely. The inside of your colon must be clean to allow for a complete examination for the presence of any growths, polyps, and/or abnormalities, as well as their biopsy or removal. A number of tips are included in order to make this part of the procedure as comfortable as possible.    Immediately:  A nurse will call you to go over instructions and your health history.  It's important to complete the nurse assessment before your procedure. If you don't, your procedure might have to be canceled.  You must arrange for an adult to drive you home after your exam. Your colonoscopy cannot be done unless you have a ride. If you need to use public transportation, someone must ride with you and stay with you for a minimum of 6-24 hours.  Check with your insurance company to be sure they will cover this exam.Arrange for a responsible adult to drive you home on the day of the exam. This cannot be a taxi or a bus as you will need someone with you after the procedure.    7 days prior:  Talk to your prescribing provider: If you take blood thinners (such as Coumadin, Plavix, Xarelto, Eliquis, Lovenox, or others), these medications may need to be stopped temporarily before your procedure. Your prescribing provider will tell you what to do.   Talk to your prescribing provider: If you take prescription NSAIDS (such as Sulindac, Celebrex, Mobic, Relafen). Your prescribing provider will tell you what to do.    If you have diabetes, you should request an early morning appointment.  Stop taking fiber supplements and multivitamins containing iron, or any other medications containing iron.  Fill your prescription for 2 containers of Golytely and 4 Dulcolax tablets at the pharmacy.  It is very important that you stay well hydrated during the colonoscopy prep. The Golytely bowel prep is designed to clean out your colon, but it will not provide hydration. While you are taking the prescribed prep, you should also drink 64 oz. of Gatorade or similar sports drink product to drink for staying hydrated. (Avoid red and purple colors)  Stop eating corn, popcorn, nuts and foods with seeds.   Begin a restricted or low fiber diet (see list below).    2 days prior:  Drink fluids to be well hydrated, this is important. Drink at least 4 large glasses of water, Gatorade, or other similar sports drinks.  It is a good idea to use Vaseline on the skin around your anus after each bowel movement to prevent irritation. Wet wipes also help to reduce irritation.   You can have a light, low-fiber breakfast. You may also have a light, low-fiber lunch.  No solid foods after 1pm. Begin a clear liquid diet. (see list below).  At 4pm, take 2 Dulcolax (bisacodyl) tablets.  At 5pm, mix and drink half of a jug of Golytely bowel prep. Drink an 8 oz. Glass of Golytely every 10-15 minutes until half of the jug is gone. Place the remainder of the Golytely in the refrigerator. Stay close to the bathroom.     1 day prior:  Continue clear liquid diet only (see examples below). Do not eat solid food this day.  Do not drink any red or purple colored drinks.  Stop taking NSAID pain relievers, such as Advil, Ibuprofen, Motrin, etc.  You may take Tylenol.  At 4 pm, take 2 Dulcolax (bisacodyl) tablets.  At 5 pm, drink the 2nd half of a jug of Golytely bowel prep. Drink an 8 oz. glass of Golytely every 10-15 minutes until the 1st jug of Golytely is gone.   The Golytely  bowel prep will not keep you hydrated. You should drink 8-10 glasses of clear liquids throughout the day.  Before you go to bed, mix the 2nd container of Golytely and place in the refrigerator for the morning.      Procedure day:  6 hours before your check-in time, drink an 8 oz. Glass of Golytely every 10-15 minutes until half of the 2nd jug of Golytely is gone. You WILL NOT drink the entire 2nd jug of Golytely.   You may take your necessary morning medications with sips of water  Do not take diabetes medicine by mouth until after your exam.  You may drink clear liquids only up until 2 hours before your arrival time.  Do not smoke, chew tobacco, or swallow anything, including water or gum for at least 2 hours before your arrival time. This is a safety issue. Your procedure could be cancelled if you do not follow directions.  Please do not wear jewelry (i.e. earrings, rings, necklaces, watches, etc) . Leave your purse, billfold, credit cards, and other valuables at home.   Please arrive with a responsible adult who can take you home after the test and stay with you for a minimum of 24 hours: The medicine used will make you sleepy and forgetful. If you do not have someone to take you home, we will cancel your procedure. If using public transportation you must have someone to ride with you.  Please perform your nebulizer treatments and airway clearance therapy in the morning prior to the procedure (if applicable).  If you have asthma, bring your inhalers.    CLEAR LIQUID DIET   You may have:  Water, tea, coffee (no milk or cream)  Soda pop, Gatorade (not red or purple)  Jell-O, Popsicles (no milk or fruit pieces - not red or purple)  Fat-free soup broth or bouillon  Plain hard candy, such as clear life savers (not red or purple)  Clear juices and fruit-flavored drinks, such as apple juice, white grape juice, Hi-C, and Sonido-Aid (not red or purple)   Do not have:  Milk or milk products such as ice cream, malts or  shakes, or coffee creamer  Red or purple drinks of any kind such as cranberry juice or grape juice. Avoid red or purple Jell-O, Popsicles, Sonido-Aid, sorbet, sherbet and candy  Juices with pulp such as orange, grapefruit, pineapple or tomato juice  Cream soups of any kind  Alcohol and beer  Protein drinks or protein powder     LOW FIBER DIET   You may have:    Starches: White bread, rolls, biscuits, croissants, Alana toast, white flour tortillas, waffles, pancakes, Northern Irish toast; white rice, noodles, pasta, macaroni; cooked and peeled potatoes; plain crackers, saltines; cooked farina or cream of rice; puffed rice, corn flakes, Rice Krispies, Special K   Vegetables: tender cooked and canned, vegetable broths  Fruits and fruit juices: Strained fruit juice, canned fruit without seeds or skin (not pineapple), applesauce, pear sauce, ripe bananas, melons (not watermelon)   Milk products: Milk (plain or flavored), cheese, cottage cheese, yogurt (no berries), custard, ice cream    Proteins: Tender, well-cooked ground beef, lamb, veal, ham, pork, chicken, turkey, fish or organ meats; eggs; creamy peanut butter   Fats and condiments:  Margarine, butter, oils, mayonnaise, sour cream, salad dressing, plain gravy; spices, cooked herbs; sugar, clear jelly, honey, syrup   Snacks, sweets and drinks: Pretzels, hard candy; plain cakes and cookies (no nuts or seeds); gelatin, plain pudding, sherbet, Popsicles; coffee, tea, carbonated ( fizzy ) drinks Do not have:    Starches: Breads or rolls that contain nuts, seeds or fruit; whole wheat or whole grain breads that contain more than 1 gram of fiber per slice; cornbread; corn or whole wheat tortillas; potatoes with skin; brown rice, wild rice, kasha (buckwheat), and oatmeal   Vegetables: Any raw or steamed vegetables; vegetables with seeds; corn in any form   Fruits and fruit juices: Prunes, prune juice, raisins and other dried fruits, berries and other fruits with seeds, canned  pineapple juices with pulp such as orange, grapefruit, pineapple or tomato juice  Milk products: Any yogurt with nuts, seeds or berries   Proteins: Tough, fibrous meats with gristle; cooked dried beans, peas or lentils; crunchy peanut butter  Fats and condiments: Pickles, olives, relish, horseradish; jam, marmalade, preserves   Snacks, sweets and drinks: Popcorn, nuts, seeds, granola, coconut, candies made with nuts or seeds; all desserts that contain nuts, seeds, raisins and other dried fruits, coconut, whole grains or bran.      FAQ:    How do you know if your colon is cleaned out?   After completing the bowel prep, your bowel movements should be all liquid and yellow. Your bowel movements will look similar to urine in the toilet. If there are pieces of stool (poop) in the toilet, or if you can't see to the bottom of the toilet, please call our office for advice. Call [phone] and ask to speak with a nurse.   Why do you need a responsible  to take you home and stay with you?  We require a responsible adult to take you home for your safety. The sedation medicines used to relax you during the procedure can impair your judgement and reaction time, make you forgetful and possible a little unsteady. Do not drive, make any important decisions, or sign any legal documents for 24 hours after your procedure.   It is normal to feel bloated and gassy after your procedure. Walking will help move the air through your colon. You can take non-aspirin pain relievers that contain acetaminophen (Tylenol).   When can you eat after your procedure?  You may resume your normal diet when you feel ready, unless advised otherwise by the doctor performing your procedure. Do not drink alcohol for 24 hours after your procedure.   You many resume normal activities (work, exercise, etc.) after 24 hours.   When will you get test results?  You should have your procedure results and any lab results (if applicable) by letter, Sonu  message, or phone call within 2 weeks. If you have any questions, please call the doctor that referred you for the procedure.       Thank you for choosing [facility], for your procedure. If you are sent a survey regarding your care, please take the time to complete the questionnaire. We value your feedback!

## 2024-03-05 NOTE — TELEPHONE ENCOUNTER
"Endoscopy Scheduling Screen    Have you had a positive Covid test in the last 14 days?  No    Are you active on MyChart?   No    What insurance is in the chart?  Other:  [employer]    Ordering/Referring Provider: uBlmaro Rees MD     (If ordering provider performs procedure, schedule with ordering provider unless otherwise instructed. )    BMI: Estimated body mass index is 30 kg/m  as calculated from the following:    Height as of 2/22/24: 1.775 m (5' 9.88\").    Weight as of 2/22/24: 94.5 kg (208 lb 6.4 oz).     Sedation Ordered  moderate sedation.   If patient BMI > 50 do not schedule in [facility].    If patient BMI > 45 do not schedule at [facility].    Are you taking methadone or Suboxone?  No    Have you had difficulties, pain, or discomfort during past endoscopy procedures?  No    Are you taking any prescription medications for pain 3 or more times per week?   NO - No RN review required.    Do you have a history of malignant hyperthermia or adverse reaction to anesthesia?  No    (Females) Are you currently pregnant?   No     Have you been diagnosed or told you have pulmonary hypertension?   No    Do you have an LVAD?  No    Have you been told you have moderate to severe sleep apnea?  No    Have you been told you have COPD, asthma, or any other lung disease?  No    Do you have any heart conditions?  No     Have you ever had an organ transplant?   No    Have you ever had or are you awaiting a heart or lung transplant?   No    Have you had a stroke or transient ischemic attack (TIA aka \"mini stroke\" in the last 6 months?   No    Have you been diagnosed with or been told you have cirrhosis of the liver?   No    Are you currently on dialysis?   No    Do you need assistance transferring?   No    BMI: Estimated body mass index is 30 kg/m  as calculated from the following:    Height as of 2/22/24: 1.775 m (5' 9.88\").    Weight as of 2/22/24: 94.5 kg (208 lb 6.4 oz).     Is patients BMI > 40 and scheduling " location [facility]?  No    Do you take an injectable medication for weight loss or diabetes (excluding insulin)?  No    Do you take the medication Naltrexone?  No    Do you take blood thinners?  No       Prep   Are you currently on dialysis or do you have chronic kidney disease?  No    Do you have a diagnosis of diabetes?  No    Do you have a diagnosis of cystic fibrosis (CF)?  No    On a regular basis do you go 3 -5 days between bowel movements?  Yes (Extended Prep)    BMI > 40?  No    Preferred Pharmacy:    [id number] - [place], [place] - [address] AT [facility] [address]  [address]  [place] [address]  Phone: [phone] Fax: [phone]      Final Scheduling Details   Colonoscopy prep sent?  N/A    Procedure scheduled  Colonoscopy    Surgeon:  Dejon     Date of procedure:  04/30/2024     Pre-OP / PAC:   No - Not required for this site.    Location  [facility] - [facility] - Patient preference.    Sedation   Moderate Sedation - Per order.      Patient Reminders:   You will receive a call from a Nurse to review instructions and health history.  This assessment must be completed prior to your procedure.  Failure to complete the Nurse assessment may result in the procedure being cancelled.      On the day of your procedure, please designate an adult(s) who can drive you home stay with you for the next 24 hours. The medicines used in the exam will make you sleepy. You will not be able to drive.      You cannot take public transportation, ride share services, or non-medical taxi service without a responsible caregiver.  Medical transport services are allowed with the requirement that a responsible caregiver will receive you at your destination.  We require that drivers and caregivers are confirmed prior to your procedure.

## 2024-03-26 ENCOUNTER — OFFICE VISIT (OUTPATIENT)
Dept: GASTROENTEROLOGY | Facility: CLINIC | Age: 39
End: 2024-03-26
Payer: COMMERCIAL

## 2024-03-26 VITALS
DIASTOLIC BLOOD PRESSURE: 72 MMHG | HEIGHT: 71 IN | OXYGEN SATURATION: 95 % | SYSTOLIC BLOOD PRESSURE: 115 MMHG | HEART RATE: 92 BPM | BODY MASS INDEX: 30.34 KG/M2 | WEIGHT: 216.7 LBS

## 2024-03-26 DIAGNOSIS — K59.09 CHRONIC CONSTIPATION: ICD-10-CM

## 2024-03-26 DIAGNOSIS — K92.1 BLOOD IN STOOL: ICD-10-CM

## 2024-03-26 PROCEDURE — 99203 OFFICE O/P NEW LOW 30 MIN: CPT | Performed by: PHYSICIAN ASSISTANT

## 2024-03-26 ASSESSMENT — PAIN SCALES - GENERAL: PAINLEVEL: NO PAIN (1)

## 2024-03-26 NOTE — PROGRESS NOTES
NEW PATIENT GI CLINIC VISIT    CC/REFERRING MD:  Bulmaro Chamberlain *  REASON FOR CONSULTATION:   Bulmaro Chamberlain * for   Chief Complaint   Patient presents with    New Patient     New consult for chronic constipation, abdominal boating, and occasional blood in stools.        ASSESSMENT/PLAN: Patient here for evaluation of chronic constipation as well as history of hematochezia.  We spent some time reviewing his symptoms.  We discussed slow transit constipation in detail.  He has gotten a little bit better with Metamucil but has largely failed OTC laxatives.  Therefore, I do agree with secretagogue therapy.  Will trial Linzess.  We reviewed the risks and benefits of this medication.  I suspect he will have a more successful colonoscopy if we are able to improve his stool output between now and then.  He will keep appointment for colonoscopy, as I do think the hematochezia deserves further evaluation.  I encouraged him to complete his labs from his physical, though I have a low suspicion that there is a metabolic source behind his constipation, given the chronicity that dates back to childhood.  I will follow-up with him after his colonoscopy to review findings and review how he is doing with Linzess.    1. Chronic constipation  - Adult GI  Referral - Consult Only  - linaclotide (LINZESS) 145 MCG capsule; Take 1 capsule (145 mcg) by mouth every morning (before breakfast)  Dispense: 30 capsule; Refill: 0    2. Blood in stool  - Adult GI  Referral - Consult Only        RTC 4-6 weeks    Thank you for this consultation.  It was a pleasure to participate in the care of this patient; please contact us with any further questions.      20 minutes spent on the date of the encounter doing chart review, patient visit, and documentation    This note was created with voice recognition software, and while reviewed for accuracy, typos may remain.     Thee Kunz PA-C  Division of Gastroenterology,  Hepatology and Nutrition  [facility]  Parish Carter is a 38 year old male with a past medical history significant for a history of heroin abuse that is seen as a new patient in the GI clinic today for evaluation of chronic constipation and previous episodes of rectal bleeding.  To review, patient states that he has had challenges with constipation since childhood.  He recalls frequently withholding and then developing hard, dry stool that was difficult to pass.  As he got into adulthood, this problem continued.  He then had a period of time in his life where he used opiates, which exacerbated the problem further.  He has been sober from this for over a year now but unfortunately continues to have challenges with constipation.  On average, has a bowel movement every 3 to 5 days.  He has gone as long as 10 days between bowel movements.  Frequently will have straining and passed a [place] stool chart 1 or 2 stool, followed by softer stool.  He has had some hematochezia on toilet paper, but nothing in this past month.  He has been using Metamucil twice daily, which has helped with the stool caliber and ease of passing stool, but has not really improved frequency very much.  Has used laxatives in the past with virtually no effect.  He is scheduled for index colonoscopy next month.  No pertinent surgical history in the abdomen or pelvis.  No recent abdominal imaging to review.  No pertinent family medical history of digestive diseases.  Currently endorses light smoking, no alcohol or drug use.  No recent laboratory testing, has pending labs from his physical last month.    ROS:    10 point ROS neg other than the symptoms noted above in the HPI.    PREVIOUS ENDOSCOPY:  None    PERTINENT RELEVANT IMAGING OR LABS:  Reviewed, see HPI    ALLERGIES:   No Known Allergies    PERTINENT MEDICATIONS:    Current Outpatient Medications:     linaclotide (LINZESS) 145 MCG capsule,  Take 1 capsule (145 mcg) by mouth every morning (before breakfast), Disp: 30 capsule, Rfl: 0    METAMUCIL FIBER PO, Take by mouth 2 times daily, Disp: , Rfl:     PROBLEM LIST  Patient Active Problem List    Diagnosis Date Noted    Tobacco abuse      Priority: Medium    Hand numbness 02/10/2020     Priority: Medium     Added automatically from request for surgery [id number]      Carpal tunnel syndrome on both sides 02/10/2020     Priority: Medium     Added automatically from request for surgery [id number]      History of heroin abuse (H) 01/09/2017     Priority: Medium     Sober since 2016.         PERTINENT PAST MEDICAL HISTORY:  Past Medical History:   Diagnosis Date    Substance abuse (H)     IV Heroin for 2 1/2 years.     Tobacco abuse        PREVIOUS SURGERIES:  Past Surgical History:   Procedure Laterality Date    RELEASE CARPAL TUNNEL Left 2/27/2020    Procedure: RELEASE, CARPAL TUNNEL, Left;  Surgeon: Parish Lin MD;  Location:  OR       SOCIAL HISTORY:  Social History     Socioeconomic History    Marital status: Single     Spouse name: Not on file    Number of children: Not on file    Years of education: Not on file    Highest education level: Not on file   Occupational History    Not on file   Tobacco Use    Smoking status: Light Smoker     Packs/day: 0.25     Years: 15.00     Additional pack years: 0.00     Total pack years: 3.75     Types: Cigarettes    Smokeless tobacco: Never    Tobacco comments:     Trying to quit.   Vaping Use    Vaping Use: Never used   Substance and Sexual Activity    Alcohol use: No    Drug use: No    Sexual activity: Yes     Partners: Female   Other Topics Concern    Parent/sibling w/ CABG, MI or angioplasty before 65F 55M? Not Asked   Social History Narrative    Not on file     Social Determinants of Health     Financial Resource Strain: Low Risk  (2/22/2024)    Financial Resource Strain     Within the past 12 months, have you or your family members you live with been unable  "to get utilities (heat, electricity) when it was really needed?: No   Food Insecurity: High Risk (2/22/2024)    Food Insecurity     Within the past 12 months, did you worry that your food would run out before you got money to buy more?: No     Within the past 12 months, did the food you bought just not last and you didn t have money to get more?: Yes   Transportation Needs: Low Risk  (2/22/2024)    Transportation Needs     Within the past 12 months, has lack of transportation kept you from medical appointments, getting your medicines, non-medical meetings or appointments, work, or from getting things that you need?: No   Physical Activity: Unknown (2/22/2024)    Exercise Vital Sign     Days of Exercise per Week: 3 days     Minutes of Exercise per Session: Not on file   Stress: Stress Concern Present (2/22/2024)    [employer] [employer] - Occupational Stress Questionnaire     Feeling of Stress : To some extent   Social Connections: Unknown (2/22/2024)    Social Connection and Isolation Panel [NHANES]     Frequency of Communication with Friends and Family: Not on file     Frequency of Social Gatherings with Friends and Family: More than three times a week     Attends Buddhism Services: Not on file     Active Member of Clubs or Organizations: Not on file     Attends Club or Organization Meetings: Not on file     Marital Status: Not on file   Interpersonal Safety: Not on file   Housing Stability: Low Risk  (2/22/2024)    Housing Stability     Do you have housing? : Yes     Are you worried about losing your housing?: No       FAMILY HISTORY:  No family history on file.    Past/family/social history reviewed and no changes    PHYSICAL EXAMINATION:  Constitutional: aaox3, cooperative, pleasant, not dyspneic/diaphoretic, no acute distress  Vitals reviewed: /72 (BP Location: Left arm, Patient Position: Sitting, Cuff Size: Adult Regular)   Pulse 92   Ht 1.791 m (5' 10.5\")   Wt 98.3 kg (216 lb 11.2 " oz)   SpO2 95%   BMI 30.65 kg/m    Wt:   Wt Readings from Last 2 Encounters:   03/26/24 98.3 kg (216 lb 11.2 oz)   02/22/24 94.5 kg (208 lb 6.4 oz)      Eyes: Sclera anicteric/injected  CV: No edema  Respiratory: Unlabored breathing  Abd: Nondistended, +bs, no hepatosplenomegaly, nontender, no peritoneal signs  Skin: warm, perfused, no jaundice  Psych: Normal affect  MSK: Normal gait

## 2024-03-26 NOTE — NURSING NOTE
"Chief Complaint   Patient presents with    New Patient     New consult for chronic constipation, abdominal boating, and occasional blood in stools.      He requests these members of his care team be copied on today's visit information:  Referring Provider:  Bulmaro Rees MD  [address]  [place] [id number]    Vitals:    03/26/24 1456   BP: 115/72   BP Location: Left arm   Patient Position: Sitting   Cuff Size: Adult Regular   Pulse: 92   SpO2: 95%   Weight: 98.3 kg (216 lb 11.2 oz)   Height: 1.791 m (5' 10.5\")     Body mass index is 30.65 kg/m .    Medications were reconciled.        Alana Stoddard CMA    "

## 2024-03-26 NOTE — LETTER
3/26/2024         RE: Parish Carter  [address] [id number]        Dear Colleague,    Thank you for referring your patient, Parish Carter, to the [facility]. Please see a copy of my visit note below.    NEW PATIENT GI CLINIC VISIT    CC/REFERRING MD:  Bulmaro Chamberlain *  REASON FOR CONSULTATION:   Bulmaro Chamberlain * for   Chief Complaint   Patient presents with     New Patient     New consult for chronic constipation, abdominal boating, and occasional blood in stools.        ASSESSMENT/PLAN: Patient here for evaluation of chronic constipation as well as history of hematochezia.  We spent some time reviewing his symptoms.  We discussed slow transit constipation in detail.  He has gotten a little bit better with Metamucil but has largely failed OTC laxatives.  Therefore, I do agree with secretagogue therapy.  Will trial Linzess.  We reviewed the risks and benefits of this medication.  I suspect he will have a more successful colonoscopy if we are able to improve his stool output between now and then.  He will keep appointment for colonoscopy, as I do think the hematochezia deserves further evaluation.  I encouraged him to complete his labs from his physical, though I have a low suspicion that there is a metabolic source behind his constipation, given the chronicity that dates back to childhood.  I will follow-up with him after his colonoscopy to review findings and review how he is doing with Linzess.    1. Chronic constipation  - Adult GI  Referral - Consult Only  - linaclotide (LINZESS) 145 MCG capsule; Take 1 capsule (145 mcg) by mouth every morning (before breakfast)  Dispense: 30 capsule; Refill: 0    2. Blood in stool  - Adult GI  Referral - Consult Only        RTC 4-6 weeks    Thank you for this consultation.  It was a pleasure to participate in the care of this patient; please contact us with any further questions.      20  minutes spent on the date of the encounter doing chart review, patient visit, and documentation    This note was created with voice recognition software, and while reviewed for accuracy, typos may remain.     Thee Kunz PA-C  Division of Gastroenterology, Hepatology and Nutrition  [facility]  Parish Carter is a 38 year old male with a past medical history significant for a history of heroin abuse that is seen as a new patient in the GI clinic today for evaluation of chronic constipation and previous episodes of rectal bleeding.  To review, patient states that he has had challenges with constipation since childhood.  He recalls frequently withholding and then developing hard, dry stool that was difficult to pass.  As he got into adulthood, this problem continued.  He then had a period of time in his life where he used opiates, which exacerbated the problem further.  He has been sober from this for over a year now but unfortunately continues to have challenges with constipation.  On average, has a bowel movement every 3 to 5 days.  He has gone as long as 10 days between bowel movements.  Frequently will have straining and passed a [place] stool chart 1 or 2 stool, followed by softer stool.  He has had some hematochezia on toilet paper, but nothing in this past month.  He has been using Metamucil twice daily, which has helped with the stool caliber and ease of passing stool, but has not really improved frequency very much.  Has used laxatives in the past with virtually no effect.  He is scheduled for index colonoscopy next month.  No pertinent surgical history in the abdomen or pelvis.  No recent abdominal imaging to review.  No pertinent family medical history of digestive diseases.  Currently endorses light smoking, no alcohol or drug use.  No recent laboratory testing, has pending labs from his physical last month.    ROS:    10 point ROS neg other than the  symptoms noted above in the HPI.    PREVIOUS ENDOSCOPY:  None    PERTINENT RELEVANT IMAGING OR LABS:  Reviewed, see HPI    ALLERGIES:   No Known Allergies    PERTINENT MEDICATIONS:    Current Outpatient Medications:      linaclotide (LINZESS) 145 MCG capsule, Take 1 capsule (145 mcg) by mouth every morning (before breakfast), Disp: 30 capsule, Rfl: 0     METAMUCIL FIBER PO, Take by mouth 2 times daily, Disp: , Rfl:     PROBLEM LIST  Patient Active Problem List    Diagnosis Date Noted     Tobacco abuse      Priority: Medium     Hand numbness 02/10/2020     Priority: Medium     Added automatically from request for surgery [id number]       Carpal tunnel syndrome on both sides 02/10/2020     Priority: Medium     Added automatically from request for surgery [id number]       History of heroin abuse (H) 01/09/2017     Priority: Medium     Sober since 2016.         PERTINENT PAST MEDICAL HISTORY:  Past Medical History:   Diagnosis Date     Substance abuse (H)     IV Heroin for 2 1/2 years.      Tobacco abuse        PREVIOUS SURGERIES:  Past Surgical History:   Procedure Laterality Date     RELEASE CARPAL TUNNEL Left 2/27/2020    Procedure: RELEASE, CARPAL TUNNEL, Left;  Surgeon: Parish Lin MD;  Location:  OR       SOCIAL HISTORY:  Social History     Socioeconomic History     Marital status: Single     Spouse name: Not on file     Number of children: Not on file     Years of education: Not on file     Highest education level: Not on file   Occupational History     Not on file   Tobacco Use     Smoking status: Light Smoker     Packs/day: 0.25     Years: 15.00     Additional pack years: 0.00     Total pack years: 3.75     Types: Cigarettes     Smokeless tobacco: Never     Tobacco comments:     Trying to quit.   Vaping Use     Vaping Use: Never used   Substance and Sexual Activity     Alcohol use: No     Drug use: No     Sexual activity: Yes     Partners: Female   Other Topics Concern     Parent/sibling w/ CABG, MI or  angioplasty before 65F 55M? Not Asked   Social History Narrative     Not on file     Social Determinants of Health     Financial Resource Strain: Low Risk  (2/22/2024)    Financial Resource Strain      Within the past 12 months, have you or your family members you live with been unable to get utilities (heat, electricity) when it was really needed?: No   Food Insecurity: High Risk (2/22/2024)    Food Insecurity      Within the past 12 months, did you worry that your food would run out before you got money to buy more?: No      Within the past 12 months, did the food you bought just not last and you didn t have money to get more?: Yes   Transportation Needs: Low Risk  (2/22/2024)    Transportation Needs      Within the past 12 months, has lack of transportation kept you from medical appointments, getting your medicines, non-medical meetings or appointments, work, or from getting things that you need?: No   Physical Activity: Unknown (2/22/2024)    Exercise Vital Sign      Days of Exercise per Week: 3 days      Minutes of Exercise per Session: Not on file   Stress: Stress Concern Present (2/22/2024)    [employer] [place] [employer] - Occupational Stress Questionnaire      Feeling of Stress : To some extent   Social Connections: Unknown (2/22/2024)    Social Connection and Isolation Panel [NHANES]      Frequency of Communication with Friends and Family: Not on file      Frequency of Social Gatherings with Friends and Family: More than three times a week      Attends Oriental orthodox Services: Not on file      Active Member of Clubs or Organizations: Not on file      Attends Club or Organization Meetings: Not on file      Marital Status: Not on file   Interpersonal Safety: Not on file   Housing Stability: Low Risk  (2/22/2024)    Housing Stability      Do you have housing? : Yes      Are you worried about losing your housing?: No       FAMILY HISTORY:  No family history on file.    Past/family/social history  "reviewed and no changes    PHYSICAL EXAMINATION:  Constitutional: aaox3, cooperative, pleasant, not dyspneic/diaphoretic, no acute distress  Vitals reviewed: /72 (BP Location: Left arm, Patient Position: Sitting, Cuff Size: Adult Regular)   Pulse 92   Ht 1.791 m (5' 10.5\")   Wt 98.3 kg (216 lb 11.2 oz)   SpO2 95%   BMI 30.65 kg/m    Wt:   Wt Readings from Last 2 Encounters:   03/26/24 98.3 kg (216 lb 11.2 oz)   02/22/24 94.5 kg (208 lb 6.4 oz)      Eyes: Sclera anicteric/injected  CV: No edema  Respiratory: Unlabored breathing  Abd: Nondistended, +bs, no hepatosplenomegaly, nontender, no peritoneal signs  Skin: warm, perfused, no jaundice  Psych: Normal affect  MSK: Normal gait                      Again, thank you for allowing me to participate in the care of your patient.        Sincerely,        Thee Kunz PA-C  "

## 2024-03-27 ENCOUNTER — TELEPHONE (OUTPATIENT)
Dept: GASTROENTEROLOGY | Facility: CLINIC | Age: 39
End: 2024-03-27
Payer: COMMERCIAL

## 2024-03-27 DIAGNOSIS — K59.09 CHRONIC CONSTIPATION: Primary | ICD-10-CM

## 2024-03-27 NOTE — TELEPHONE ENCOUNTER
PRIOR AUTHORIZATION DENIED    Medication: LINACLOTIDE 145 MCG PO CAPS  Insurance Company: [employer] [place] - Phone [phone] Fax [phone]  Denial Date: 3/27/2024  Denial Reason(s):       Appeal Information:     Patient Notified: No

## 2024-04-01 RX ORDER — LUBIPROSTONE 24 UG/1
24 CAPSULE ORAL 2 TIMES DAILY WITH MEALS
Qty: 60 CAPSULE | Refills: 1 | Status: SHIPPED | OUTPATIENT
Start: 2024-04-01 | End: 2024-08-27

## 2024-04-01 NOTE — TELEPHONE ENCOUNTER
Left message with call back number    Grey TRAVIS denied, Provider sent new order to [facility]'s pharmacy for lubiprostone ( Amitiza)

## 2024-04-01 NOTE — TELEPHONE ENCOUNTER
It looks like lubiprostone is the preferred agent, so this was sent to pharmacy instead.      Thanks,    Thee Kunz PA-C

## 2024-04-01 NOTE — TELEPHONE ENCOUNTER
[facility] Call Center    Phone Message    May a detailed message be left on voicemail: yes     Reason for Call: Other: Pt called in for an update on his PA for the Linzess. Pt would like a call back to discuss if approved, warranting an appeal or if a different medication can be prescribed. Please review and call pt back to further discuss and advise. Thank you!     Action Taken: Message routed to:  [facility] [[id number]]    Travel Screening: Not Applicable

## 2024-04-05 RX ORDER — BISACODYL 5 MG/1
TABLET, DELAYED RELEASE ORAL
Qty: 4 TABLET | Refills: 0 | Status: SHIPPED | OUTPATIENT
Start: 2024-04-05

## 2024-04-05 NOTE — TELEPHONE ENCOUNTER
Extended Golytely Bowel Prep . Instructions were sent via letter. Bowel prep was sent 4/5/2024 to    [id number] - [place], [place] - [address] [place] [address] NE AT [facility]

## 2024-04-17 ENCOUNTER — TELEPHONE (OUTPATIENT)
Dept: GASTROENTEROLOGY | Facility: CLINIC | Age: 39
End: 2024-04-17
Payer: COMMERCIAL

## 2024-04-17 DIAGNOSIS — Z12.11 SPECIAL SCREENING FOR MALIGNANT NEOPLASMS, COLON: ICD-10-CM

## 2024-04-17 DIAGNOSIS — K59.09 CHRONIC CONSTIPATION: Primary | ICD-10-CM

## 2024-04-17 DIAGNOSIS — K92.1 BLOOD IN STOOL: ICD-10-CM

## 2024-04-17 NOTE — TELEPHONE ENCOUNTER
Attempted to contact patient in order to complete pre assessment questions.     No answer. Left message to return call to [phone] option 4    Callback required communication sent via letter.      Procedure details:    Patient scheduled for Colonoscopy  on 4.30.24.     Arrival time: 1345. Procedure time 1430    Facility location: [facility] [place]; [address] Floor, [place] [address]. Check in location: 2nd Floor at Surgery desk.    Sedation type: Conscious sedation     Pre op exam needed? N/A    Indication for procedure:   Chronic constipation [K59.09]      Blood in stool [K92.1]            Chart review:     Electronic implanted devices? No    Recent diagnosis of diverticulitis within the last 6 weeks? No    Diabetic? No      Medication review:    Anticoagulants? No    NSAIDS? No NSAID medications per patient's medication list.  RN will verify with pre-assessment call.    Other medication HOLDING recommendations:  N/A      Prep for procedure:     Bowel prep recommendation: Extended Golytely. Bowel prep prescription sent to    [id number] - [place], [place] - [address] AT [facility] OF [address]  Due to: constipation noted or reported.     Prep instructions sent via letter.       Ivanna Moss RN  Endoscopy Procedure Pre Assessment RN

## 2024-04-17 NOTE — LETTER
4/17/2024         RE: Parish Carter  [address] [facility] [address]        Colonoscopy      Procedure date: 5/9/24    Anticipated arrival time: 6:45 AM   (Please note that arrival times may change)    Facility location: [facility]; [address] Floor, [place] [address] - Check in location: 2nd Floor at Surgery desk      Important Procedure Reminders:     Prep Instructions:   Instructions on how to prepare for your upcoming procedure are found below. Please read instructions carefully. Deviation from instructions may result in less than desired outcomes and procedure may need to be rescheduled.   If you have additional questions regarding how to prepare for your upcoming procedure, contact our endoscopy pre assessment nurses at [phone] option 4 Monday through Friday 7:00am-5:00pm     Policy:   The medications used during the procedure will make you sleepy, so you won't be able to drive. On the day of your procedure, please have an adult ready to drive you home and stay with you for the next 6 hours.   You can't use public transportation, ride-share services, or non-medical taxi services without a responsible caregiver. Medical transport services are okay, but a caregiver must be there to receive you at your destination.   Make sure your  and caregiver are confirmed before your procedure.      Day of procedure:  Please keep in mind that arrival times may change. Let your  know there might be a one-hour window for changes.  We ask that you please check in at the  with your . Your  should remain on campus.  Expect to be at the procedure center for about 1.5-2.5 hours.    Please do not wear jewelry (i.e. earrings, rings, necklaces, watches, etc). Leave your purse, billfold, credit cards, and other valuables at home.   Bring insurance card and ID.     To cancel or reschedule your procedure:   Within 14 days of your  procedure if you develop any flu-like symptoms (such as fever, cough, shortness of breath), COVID-19 like symptoms or exposure to COVID-19, contact our endoscopy team at [phone] option 4 to determine if procedure can be completed or needs to be delayed.   If you need to cancel or reschedule, our endoscopy scheduling team can be reached at [phone], option 2. Monday through Friday, 7:00am-5:00pm.      Medication Reminders:    Please note the following medication holding recommendations:   N/A    ----------------------------------------------------------------------------------------------------------------------------------------------------------------------------------------------------------------------------------------------------------------------      Extended Colonoscopy Prep - Two Day Full Prep  Prep instructions for colonoscopy   Pre-Assessment Phone Number: [facility]; [phone] option 4      Please read these instructions carefully at least 7 days prior to your colonoscopy procedure. Be sure to follow all directions completely. The inside of your colon must be clean to allow for a complete examination for the presence of any growths, polyps, and/or abnormalities, as well as their biopsy or removal. A number of tips are included in order to make this part of the procedure as comfortable as possible.    Immediately:  A nurse will call you approximately 1 week before your exam to prescribe your bowel prep and review the prep instructions with you.   You must arrange for an adult to drive you home after your exam. Your colonoscopy cannot be done unless you have a ride. If you need to use public transportation, someone must ride with you and stay with you for a minimum of 6-24 hours.  Check with your insurance company to be sure they will cover this exam.Arrange for a responsible adult to drive you home on the day of the exam. This cannot be a taxi or a bus as you will  need someone with you after the procedure.    7 days prior:  Talk to your prescribing doctor: If you take blood thinners (such as Coumadin, Plavix, Xarelto, Eliquis, Lovenox, or others), these medications may need to be stopped temporarily before your procedure. Your prescribing doctor will tell you what to do.   If you have diabetes, you should request an early morning appointment.  Talk to your prescribing provider: If you take prescription NSAIDS (such as Sulindac, Celebrex, Mobic, Relafen). Your prescribing provider will tell you what to do.  Stop taking fiber supplements and multivitamins containing iron, or any other medications containing iron.  Fill your prescription for 2 container of Golytely and 4 Dulcolax tablets at the pharmacy.  It is very important that you stay well hydrated during the colonoscopy prep. The Golytely bowel prep is designed to clean out your colon, but it will not provide hydration. While you are taking the prescribed prep, you should also drink 64 oz. of Gatorade or similar sports drink product to drink for staying hydrated. (Avoid red and purple colors)  Stop eating corn, popcorn, nuts and foods with seeds.     7 days prior:  Begin a restricted or low fiber diet (see examples below).  Begin taking Miralax 17 grams (1 capful) twice daily in 8 ounces of a clear liquid.     3 days prior:   Begin a clear liquid diet. (see list below).      2 days prior:  Stop taking NSAID pain relievers, such as Advil, Aleve, Ibuprofen, Naproxen, and Motrin.  Drink fluids to be well hydrated, this is important. Drink at least 4 large glasses of water, Gatorade, or other similar sports drinks.  It is a good idea to use Vaseline on the skin around your anus after each bowel movement to prevent irritation. Wet wipes also help to reduce irritation.   At 4pm, take 2 Dulcolax (bisacodyl) tablets.  At 5pm, mix and drink half of a jug of Golytely bowel prep. Drink an 8 oz. Glass of Golytely every 15 minutes  until HALF of the jug is gone. Place the remainder of the Golytely in the refrigerator. Stay close to the bathroom.     1 day prior:  Continue clear liquid diet only (see examples below). Do not eat solid food this day.  Do not drink any red or purple colored drinks.  Stop taking NSAID pain relievers, such as Advil, Ibuprofen, Motrin, etc.  You may take Tylenol.  At 10 am, take 2 Dulcolax (bisacodyl) tablets.  At 3 pm, drink the 2nd half of a jug of Golytely bowel prep. Drink an 8 oz. glass of Golytely every 10-15 minutes until the 1st jug of Golytely is gone.   The Golytely bowel prep will not keep you hydrated. You should drink 8-10 glasses of clear liquids throughout the day.  At 8pm, mix and drink half of second jug of Golytely bowel prep. Drink an 8 oz. Glass of Golytely every 15 minutes until HALF of the jug is gone. Place the remainder of the Golytely in the refrigerator. Stay close to the bathroom.       Procedure day:  6 hours before your check-in time, drink an 8 oz. Glass of Golytely every 15 minutes until the 2nd jug of Golytely is gone.  You may take your necessary morning medications with sips of water.  Do not smoke, chew tobacco, or swallow anything, including water or gum for at least 2 hours before your colonoscopy. This is a safety issue. Your procedure could be cancelled if you do not follow directions.  Please do not wear jewelry (i.e. earrings, rings, necklaces, watches, etc) . Leave your purse, billfold, credit cards, and other valuables at home.   Please arrive with a responsible adult who can take you home after the test and stay with you for a minimum of 24 hours: The medicine used will make you sleepy and forgetful. If you do not have someone to take you home, we will cancel your procedure. If using public transportation you must have someone to ride with you.  Please perform your nebulizer treatments and airway clearance therapy in the morning prior to the procedure (if applicable).  If  you have asthma, bring your inhalers.    CLEAR LIQUID DIET   You may have:  Water, tea, coffee (no milk or cream)  Soda pop, Gatorade (not red or purple)  Jell-O, Popsicles (no milk or fruit pieces - not red or purple)  Fat-free soup broth or bouillon  Plain hard candy, such as clear life savers (not red or purple)  Clear juices and fruit-flavored drinks, such as apple juice, white grape juice, Hi-C, and Sonido-Aid (not red or purple)   Do not have:  Milk or milk products such as ice cream, malts or shakes, or coffee creamer  Red or purple drinks of any kind such as cranberry juice or grape juice. Avoid red or purple Jell-O, Popsicles, Sonido-Aid, sorbet, sherbet and candy  Juices with pulp such as orange, grapefruit, pineapple or tomato juice  Cream soups of any kind  Alcohol and beer  Protein drinks or protein powder     LOW FIBER DIET   You may have:    Starches: White bread, rolls, biscuits, croissants, [place] toast, white flour tortillas, waffles, pancakes, Australian toast; white rice, noodles, pasta, macaroni; cooked and peeled potatoes; plain crackers, saltines; cooked farina or cream of rice; puffed rice, corn flakes, Rice Krispies, Special K   Vegetables: tender cooked and canned, vegetable broths  Fruits and fruit juices: Strained fruit juice, canned fruit without seeds or skin (not pineapple), applesauce, pear sauce, ripe bananas, melons (not watermelon)   Milk products: Milk (plain or flavored), cheese, cottage cheese, yogurt (no berries), custard, ice cream    Proteins: Tender, well-cooked ground beef, lamb, veal, ham, pork, chicken, turkey, fish or organ meats; eggs; creamy peanut butter   Fats and condiments:  Margarine, butter, oils, mayonnaise, sour cream, salad dressing, plain gravy; spices, cooked herbs; sugar, clear jelly, honey, syrup   Snacks, sweets and drinks: Pretzels, hard candy; plain cakes and cookies (no nuts or seeds); gelatin, plain pudding, sherbet, Popsicles; coffee, tea, carbonated  ( fizzy ) drinks Do not have:    Starches: Breads or rolls that contain nuts, seeds or fruit; whole wheat or whole grain breads that contain more than 1 gram of fiber per slice; cornbread; corn or whole wheat tortillas; potatoes with skin; brown rice, wild rice, kasha (buckwheat), and oatmeal   Vegetables: Any raw or steamed vegetables; vegetables with seeds; corn in any form   Fruits and fruit juices: Prunes, prune juice, raisins and other dried fruits, berries and other fruits with seeds, canned pineapple juices with pulp such as orange, grapefruit, pineapple or tomato juice  Milk products: Any yogurt with nuts, seeds or berries   Proteins: Tough, fibrous meats with gristle; cooked dried beans, peas or lentils; crunchy peanut butter  Fats and condiments: Pickles, olives, relish, horseradish; jam, marmalade, preserves   Snacks, sweets and drinks: Popcorn, nuts, seeds, granola, coconut, candies made with nuts or seeds; all desserts that contain nuts, seeds, raisins and other dried fruits, coconut, whole grains or bran.      FAQ:    How do you know if your colon is cleaned out?   After completing the bowel prep, your bowel movements should be all liquid and yellow. Your bowel movements will look similar to urine in the toilet. If there are pieces of stool (poop) in the toilet, or if you can't see to the bottom of the toilet, please call our office for advice. Call [phone] and ask to speak with a nurse.   Why do you need a responsible  to take you home and stay with you?  We require a responsible adult to take you home for your safety. The sedation medicines used to relax you during the procedure can impair your judgement and reaction time, make you forgetful and possible a little unsteady. Do not drive, make any important decisions, or sign any legal documents for 24 hours after your procedure.   It is normal to feel bloated and gassy after your procedure. Walking will help move the air through your colon.  You can take non-aspirin pain relievers that contain acetaminophen (Tylenol).   When can you eat after your procedure?  You may resume your normal diet when you feel ready, unless advised otherwise by the doctor performing your procedure. Do not drink alcohol for 24 hours after your procedure.   You many resume normal activities (work, exercise, etc.) after 24 hours.   When will you get test results?  You should have your procedure results and any lab results (if applicable) by letter, [employer] message, or phone call within 2 weeks. If you have any questions, please call the doctor that referred you for the procedure.       Thank you for choosing [facility], for your procedure. If you are sent a survey regarding your care, please take the time to complete the questionnaire. We value your feedback!

## 2024-04-19 NOTE — TELEPHONE ENCOUNTER
Second call attempt to complete pre assessment.     The Pt did , he cannot talk at the moment. Advised him to call by next business day prior to 4PM or procedure will be sent to cancel.     Pt not active on [employer], but information was relayed verbally.       Tierney Weinberg, RN  Endoscopy Procedure Pre Assessment RN

## 2024-04-22 NOTE — TELEPHONE ENCOUNTER
Pre assessment completed for upcoming procedure.   (Please see previous telephone encounter notes for complete details)    Patient  returned call.       Procedure details:    Arrival time and facility location reviewed.    Pre op exam needed? N/A    Designated  policy reviewed. Instructed to have someone stay 6  hours post procedure.       Medication review:    Medications reviewed. Please see supporting documentation below. Holding recommendations discussed (if applicable).       Prep for procedure:     Procedure prep instructions reviewed.        Any additional information needed:  N/A      Patient  verbalized understanding and had no questions or concerns at this time.      Rochelle Buenrostro RN  Endoscopy Procedure Pre Assessment RN  [phone] option 4

## 2024-04-30 ENCOUNTER — HOSPITAL ENCOUNTER (OUTPATIENT)
Facility: AMBULATORY SURGERY CENTER | Age: 39
Discharge: HOME | End: 2024-04-30
Attending: COLON & RECTAL SURGERY
Payer: COMMERCIAL

## 2024-04-30 ENCOUNTER — TELEPHONE (OUTPATIENT)
Dept: GASTROENTEROLOGY | Facility: CLINIC | Age: 39
End: 2024-04-30
Payer: COMMERCIAL

## 2024-04-30 ENCOUNTER — HOSPITAL ENCOUNTER (OUTPATIENT)
Facility: AMBULATORY SURGERY CENTER | Age: 39
End: 2024-04-30
Attending: INTERNAL MEDICINE
Payer: COMMERCIAL

## 2024-04-30 RX ORDER — SODIUM CHLORIDE, SODIUM LACTATE, POTASSIUM CHLORIDE, CALCIUM CHLORIDE 600; 310; 30; 20 MG/100ML; MG/100ML; MG/100ML; MG/100ML
INJECTION, SOLUTION INTRAVENOUS CONTINUOUS
Status: DISCONTINUED | OUTPATIENT
Start: 2024-04-30 | End: 2024-08-10 | Stop reason: HOSPADM

## 2024-04-30 RX ORDER — ONDANSETRON 2 MG/ML
4 INJECTION INTRAMUSCULAR; INTRAVENOUS
Status: DISCONTINUED | OUTPATIENT
Start: 2024-04-30 | End: 2024-08-10 | Stop reason: HOSPADM

## 2024-04-30 RX ORDER — LIDOCAINE 40 MG/G
CREAM TOPICAL
Status: DISCONTINUED | OUTPATIENT
Start: 2024-04-30 | End: 2024-08-10 | Stop reason: HOSPADM

## 2024-04-30 NOTE — TELEPHONE ENCOUNTER
----- Message from Carlos Mcallister RN sent at 4/30/2024  1:52 PM CDT -----  Hi,    Will you please call this patient to reschedule his colonoscopy. He was not able to have his colonoscopy due to inadequate prep and still going solid stool.     Per Dr. Ashford, for his next colonoscopy he should take the extended golytely prep with an additional day of clear liquid diet and Miralax two times daily for the 7 days prior to his colonoscopy.    Thanks,  Carlos

## 2024-04-30 NOTE — TELEPHONE ENCOUNTER
Caller: Writer to patient    Reason for Reschedule/Cancellation   (please be detailed, any staff messages or encounters to note?): Inadequate prep      Prior to reschedule please review:  Ordering Provider: Bulmaro Rees MD   Sedation Determined: Moderate  Does patient have any ASC Exclusions, please identify?: No      Notes on Cancelled Procedure:  Procedure: Lower Endoscopy [Colonoscopy]   Date: 4/30/2024  Location: [facility]; [address], [place] [address]   Surgeon: Dejon      Rescheduled: Yes,   Procedure: Lower Endoscopy [Colonoscopy]    Date: 5/9/2024   Location: [facility]; [address] Floor, [place] [address]    Surgeon: Stanislaw   Sedation Level Scheduled  Moderate ,  Reason for Sedation Level Order   Instructions updated and sent: RN to send letter w/ prep     Does patient need PAC or Pre -Op Rescheduled? : No       Did you cancel or rescheduled an EUS procedure? No.

## 2024-05-01 RX ORDER — BISACODYL 5 MG/1
TABLET, DELAYED RELEASE ORAL
Qty: 4 TABLET | Refills: 0 | Status: SHIPPED | OUTPATIENT
Start: 2024-05-01 | End: 2024-08-16

## 2024-05-01 RX ORDER — ONDANSETRON 4 MG/1
TABLET, FILM COATED ORAL
Qty: 6 TABLET | Refills: 0 | Status: SHIPPED | OUTPATIENT
Start: 2024-05-01 | End: 2024-08-16

## 2024-05-01 NOTE — TELEPHONE ENCOUNTER
Called the Pt to complete Pre-Assessment. First Attempt. No answer, Left message.     Rescheduled procedure    Patient scheduled for Colonoscopy  on 5/9/24.    Arrival time: 0645. Procedure time 0730    Pre op exam needed? N/A    Facility location: [facility]; [address], [place] [address]    Sedation type: Conscious sedation     Pt rescheduled due to inadequate prep -- previously was extended. Per Dr. Ashford, needs full Golytely prep with additional day of CLD and Miralax BID.     New script sent, also sent Zofran 6 tabs as well.     Letter sent.     Tierney Weinberg RN  Endoscopy Procedure Pre Assessment RN

## 2024-05-02 RX ORDER — ONDANSETRON 4 MG/1
4 TABLET, ORALLY DISINTEGRATING ORAL EVERY 6 HOURS PRN
Status: CANCELLED | OUTPATIENT
Start: 2024-05-02

## 2024-05-02 RX ORDER — PROCHLORPERAZINE MALEATE 10 MG
10 TABLET ORAL EVERY 6 HOURS PRN
Status: CANCELLED | OUTPATIENT
Start: 2024-05-02

## 2024-05-02 RX ORDER — LIDOCAINE 40 MG/G
CREAM TOPICAL
Status: CANCELLED | OUTPATIENT
Start: 2024-05-02

## 2024-05-02 RX ORDER — NALOXONE HYDROCHLORIDE 0.4 MG/ML
0.2 INJECTION, SOLUTION INTRAMUSCULAR; INTRAVENOUS; SUBCUTANEOUS
Status: CANCELLED | OUTPATIENT
Start: 2024-05-02

## 2024-05-02 RX ORDER — NALOXONE HYDROCHLORIDE 0.4 MG/ML
0.4 INJECTION, SOLUTION INTRAMUSCULAR; INTRAVENOUS; SUBCUTANEOUS
Status: CANCELLED | OUTPATIENT
Start: 2024-05-02

## 2024-05-02 RX ORDER — ONDANSETRON 2 MG/ML
4 INJECTION INTRAMUSCULAR; INTRAVENOUS
Status: CANCELLED | OUTPATIENT
Start: 2024-05-02

## 2024-05-02 RX ORDER — FLUMAZENIL 0.1 MG/ML
0.2 INJECTION, SOLUTION INTRAVENOUS
Status: CANCELLED | OUTPATIENT
Start: 2024-05-02 | End: 2024-05-03

## 2024-05-02 RX ORDER — ONDANSETRON 2 MG/ML
4 INJECTION INTRAMUSCULAR; INTRAVENOUS EVERY 6 HOURS PRN
Status: CANCELLED | OUTPATIENT
Start: 2024-05-02

## 2024-05-02 NOTE — TELEPHONE ENCOUNTER
Second call attempt to complete pre assessment.     No answer.  Left message to return call to [phone] #4 by next business day prior to 4PM or procedure will be sent to cancel.     Callback required communication not sent, only form of communication letter will not reach patient in time before cancellation.    Corinne Kliber, RN  Endoscopy Procedure Pre Assessment RN

## 2024-05-06 NOTE — TELEPHONE ENCOUNTER
Caller: No call    Reason for Reschedule/Cancellation   (please be detailed, any staff messages or encounters to note?): Cancellation policy - pre-assessment call not completed.      Prior to reschedule please review:  Ordering Provider: Bulmaro Rees MD   Sedation Determined: Moderate  Does patient have any ASC Exclusions, please identify?: No      Notes on Cancelled Procedure:  Procedure: Lower Endoscopy [Colonoscopy]   Date: 5/9/2024  Location: [facility] [place]; [address] [place] [address], [address] Floor, [place] [address]   Surgeon: Stanislaw      Rescheduled: No, sent letter and CTL.        Did you cancel or rescheduled an EUS procedure? No.     CASE IN DEPOT.

## 2024-05-06 NOTE — TELEPHONE ENCOUNTER
No return call for PA. Staff message sent to cancel procedure per policy.     Tierney Weinberg RN   Endoscopy Procedure Pre Assessment RN

## 2024-05-09 NOTE — TELEPHONE ENCOUNTER
Based on  questionnaire from 03/05/2024    Endoscopy Scheduling Screen      Final Scheduling Details     Procedure scheduled  Colonoscopy    Surgeon:  Jennifer     Date of procedure:  07/01/2024     Pre-OP / PAC:   No - Not required for this site.    Location  [facility] - [facility] - Patient preference.    Sedation   Moderate Sedation - Per order.      Patient Reminders:   You will receive a call from a Nurse to review instructions and health history.  This assessment must be completed prior to your procedure.  Failure to complete the Nurse assessment may result in the procedure being cancelled.      On the day of your procedure, please designate an adult(s) who can drive you home stay with you for the next 24 hours. The medicines used in the exam will make you sleepy. You will not be able to drive.      You cannot take public transportation, ride share services, or non-medical taxi service without a responsible caregiver.  Medical transport services are allowed with the requirement that a responsible caregiver will receive you at your destination.  We require that drivers and caregivers are confirmed prior to your procedure.

## 2024-06-23 NOTE — TELEPHONE ENCOUNTER
Rescheduled colonoscopy    Pre visit planning completed.      Procedure details:    Patient scheduled for Colonoscopy  on 7.1.24.     Arrival time: 1300. Procedure time 1345    Facility location: [facility] [place]; [address], [place] [address]. Check in location: 2nd Floor at Surgery desk.    Sedation type: Conscious sedation     Pre op exam needed? N/A    Indication for procedure: Chronic constipation, blood in stool      Chart review:     Electronic implanted devices? No    Recent diagnosis of diverticulitis within the last 6 weeks? No    Diabetic? No      Medication review:    Anticoagulants? No    NSAIDS? No NSAID medications per patient's medication list.  RN will verify with pre-assessment call.    Other medication HOLDING recommendations:  N/A      Prep for procedure:     Bowel prep recommendation: previously was extended. Per Dr. Ashford, needs full Golytely prep with additional day of CLD and Miralax BID. Rx sent 5.1.24 - inquire if patient picked it up.  Due to: poor prep/inadequate bowel prep in the past.     Discuss Email option for instructions. Letter re-sent.        Ivanna Moss RN  Endoscopy Procedure Pre Assessment RN  [phone] option 4

## 2024-06-24 RX ORDER — POLYETHYLENE GLYCOL 3350 17 G/17G
POWDER, FOR SOLUTION ORAL
Qty: 238 G | Refills: 0 | Status: SHIPPED | OUTPATIENT
Start: 2024-06-24 | End: 2024-08-16

## 2024-06-24 RX ORDER — BISACODYL 5 MG/1
TABLET, DELAYED RELEASE ORAL
Qty: 4 TABLET | Refills: 0 | Status: SHIPPED | OUTPATIENT
Start: 2024-06-24 | End: 2024-08-16

## 2024-06-24 NOTE — TELEPHONE ENCOUNTER
Pre assessment completed for upcoming procedure.   (Please see previous telephone encounter notes for complete details)    Procedure details:    Arrival time and facility location reviewed.    Pre op exam needed? N/A    Designated  policy reviewed. Instructed to have someone stay 6  hours post procedure.       Medication review:    Medications reviewed. Please see supporting documentation below. Holding recommendations discussed (if applicable).       Prep for procedure:     Procedure prep instructions reviewed.  Prep instructions sent via email as well.  [email or web address]      Any additional information needed:  N/A      Patient  verbalized understanding and had no questions or concerns at this time.      Nimo Watkins RN  [phone] option 4

## 2024-07-01 ENCOUNTER — TELEPHONE (OUTPATIENT)
Dept: GASTROENTEROLOGY | Facility: CLINIC | Age: 39
End: 2024-07-01
Payer: COMMERCIAL

## 2024-07-01 NOTE — TELEPHONE ENCOUNTER
"Endoscopy Scheduling Screen    Have you had a positive Covid test in the last 14 days?  No    What is your communication preference for Instructions and/or Bowel Prep?   MyChart    What insurance is in the chart?  Other:  [employer]    Ordering/Referring Provider:     JASMYNE LAWS      (If ordering provider performs procedure, schedule with ordering provider unless otherwise instructed. )    BMI: Estimated body mass index is 30.65 kg/m  as calculated from the following:    Height as of 3/26/24: 1.791 m (5' 10.5\").    Weight as of 3/26/24: 98.3 kg (216 lb 11.2 oz).     Sedation Ordered  moderate sedation.   If patient BMI > 50 do not schedule in [facility].    If patient BMI > 45 do not schedule at [facility].    Are you taking methadone or Suboxone?  No    Have you had difficulties, pain, or discomfort during past endoscopy procedures?  No    Are you taking any prescription medications for pain 3 or more times per week?   NO, No RN review required.    Do you have a history of malignant hyperthermia?  No    (Females) Are you currently pregnant?   No     Have you been diagnosed or told you have pulmonary hypertension?   No    Do you have an LVAD?  No    Have you been told you have moderate to severe sleep apnea?  No    Have you been told you have COPD, asthma, or any other lung disease?  No    Do you have any heart conditions?  No     Have you ever had or are you waiting for an organ transplant?  No. Continue scheduling, no site restrictions.    Have you had a stroke or transient ischemic attack (TIA aka \"mini stroke\" in the last 6 months?   No    Have you been diagnosed with or been told you have cirrhosis of the liver?   No    Are you currently on dialysis?   No    Do you need assistance transferring?   No    BMI: Estimated body mass index is 30.65 kg/m  as calculated from the following:    Height as of 3/26/24: 1.791 m (5' 10.5\").    Weight as of 3/26/24: 98.3 kg (216 lb 11.2 oz).     Is patients BMI > 40 " and scheduling location [facility]?  No    Do you take an injectable medication for weight loss or diabetes (excluding insulin)?  No    Do you take the medication Naltrexone?  No    Do you take blood thinners?  No       Prep   Are you currently on dialysis or do you have chronic kidney disease?  No    Do you have a diagnosis of diabetes?  No    Do you have a diagnosis of cystic fibrosis (CF)?  No    On a regular basis do you go 3 -5 days between bowel movements?  Yes (Extended Prep)    BMI > 40?  No    Preferred Pharmacy:    [id number] - [place], [place] - [address] AT [facility] [address]  [address]  [place] [address]  Phone: [phone] Fax: [phone]    Final Scheduling Details     Caller: Parish Carter      Reason for Reschedule/Cancellation   (please be detailed, any staff messages or encounters to note?): PT IS NOT COMPLETELY PREPPED      Prior to reschedule please review:  Ordering Provider:     JASMYNE LAWS     Sedation Determined: MODERATE  Does patient have any ASC Exclusions, please identify?: N      Notes on Cancelled Procedure:  Procedure: Lower Endoscopy [Colonoscopy]   Date: 07/01/2024  Location: [facility]; [address], [place] [address]   Surgeon: CHAPINCITO      Rescheduled: Yes,   Procedure: Lower Endoscopy [Colonoscopy]    Date: 08/26/2024   Location: [facility]; [address], [place] [address]    Surgeon: BRIANDA   Sedation Level Scheduled  MODERATE ,  Reason for Sedation Level PER ORDER   Instructions updated and sent: VIA LETTER    Does patient need PAC or Pre -Op Rescheduled? : NO       Did you cancel or rescheduled an EUS procedure? No.

## 2024-08-16 ENCOUNTER — TELEPHONE (OUTPATIENT)
Dept: FAMILY MEDICINE | Facility: CLINIC | Age: 39
End: 2024-08-16
Payer: COMMERCIAL

## 2024-08-16 RX ORDER — POLYETHYLENE GLYCOL 3350 17 G/17G
POWDER, FOR SOLUTION ORAL
Qty: 238 G | Refills: 0 | Status: SHIPPED | OUTPATIENT
Start: 2024-08-16

## 2024-08-16 RX ORDER — BISACODYL 5 MG/1
TABLET, DELAYED RELEASE ORAL
Qty: 4 TABLET | Refills: 0 | Status: SHIPPED | OUTPATIENT
Start: 2024-08-16

## 2024-08-16 NOTE — TELEPHONE ENCOUNTER
Reason for Call:  Appointment Request    Patient requesting this type of appt:  acute    Requested provider:  anyone in [place]    Reason patient unable to be scheduled: Not within requested timeframe    When does patient want to be seen/preferred time:  will be back in town on 08/20    Comments: Pt has discomfort and some blood while urinating. He is coming back on Tuesday and is hoping to get in. Pt declined triage.    Okay to leave a detailed message?: Yes at Cell number on file:    Telephone Information:   Mobile [phone]       Call taken on 8/16/2024 at 10:09 AM by Tiffany Marie

## 2024-08-16 NOTE — TELEPHONE ENCOUNTER
Rescheduled Procedure   (Please see previous telephone encounter notes for complete details).      Pre visit planning completed.      Procedure details:    Patient scheduled for Colonoscopy on 8/26/24.     Arrival time: 1150. Procedure time 1235    Facility location: [facility]; [address], [place] [address]. Check in location: 2nd Floor at Surgery desk.    Sedation type: Conscious sedation     Pre op exam needed? No.    Indication for procedure:   Chronic constipation [K59.09]      Blood in stool [K92.1]            Chart review:     Electronic implanted devices? No    Recent diagnosis of diverticulitis within the last 6 weeks? No      Medication review:    Diabetic? No    Anticoagulants? No    Weight loss medication/injectable? No.    NSAIDS? No NSAID medications per patient's medication list.  RN will verify with pre-assessment call.    Other medication HOLDING recommendations:  N/A      Prep for procedure:     Bowel prep recommendation: Extended Golytely (2 full containers). Bowel prep prescription sent to    [id number] - [place], [place] - [address] AT [facility]    Due to: provider request/ordered.  (See below notes)    Prep instructions sent via letter  setn 4/17/24 with full prep instructions and instructions for 3 day CLD.  Did        Corinne Kliber, RN  Endoscopy Procedure Pre Assessment RN  [phone] option 4

## 2024-08-16 NOTE — TELEPHONE ENCOUNTER
Called patient to schedule. No answer. LM for call back. If patient calls back please assist I scheduling     Itz-

## 2024-08-16 NOTE — TELEPHONE ENCOUNTER
Pre assessment completed for upcoming procedure.   (Please see previous telephone encounter notes for complete details)    Procedure details:    Arrival time and facility location reviewed.    Pre op exam needed? No.    Designated  policy reviewed. Instructed to have someone stay 6 hours post procedure.     COVID policy reviewed.      Medication review:    Medications reviewed. Please see supporting documentation below. Holding recommendations discussed (if applicable).       Prep for procedure:     Procedure prep instructions reviewed.        Additional information needed?    Patient requested prep instructions be sent to email  [email or web address]           Writer sent prep instructions to patients email as requested.    The patient also requested prep be resent to preferred pharmacy.  Writer resent and reviewed prep instructions with patient      Patient  verbalized understanding and had no questions or concerns at this time.      Corinne Kliber, RN  Endoscopy Procedure Pre Assessment   [phone] option 4

## 2024-08-22 ENCOUNTER — OFFICE VISIT (OUTPATIENT)
Dept: FAMILY MEDICINE | Facility: CLINIC | Age: 39
End: 2024-08-22
Payer: COMMERCIAL

## 2024-08-22 VITALS
RESPIRATION RATE: 16 BRPM | TEMPERATURE: 97.9 F | HEART RATE: 77 BPM | DIASTOLIC BLOOD PRESSURE: 60 MMHG | BODY MASS INDEX: 27.38 KG/M2 | WEIGHT: 195.6 LBS | SYSTOLIC BLOOD PRESSURE: 100 MMHG | HEIGHT: 71 IN | OXYGEN SATURATION: 98 %

## 2024-08-22 DIAGNOSIS — R73.9 HYPERGLYCEMIA: ICD-10-CM

## 2024-08-22 DIAGNOSIS — Z13.220 ENCOUNTER FOR LIPID SCREENING FOR CARDIOVASCULAR DISEASE: ICD-10-CM

## 2024-08-22 DIAGNOSIS — R30.0 DYSURIA: Primary | ICD-10-CM

## 2024-08-22 DIAGNOSIS — Z13.6 ENCOUNTER FOR LIPID SCREENING FOR CARDIOVASCULAR DISEASE: ICD-10-CM

## 2024-08-22 DIAGNOSIS — R31.0 GROSS HEMATURIA: ICD-10-CM

## 2024-08-22 LAB
ALBUMIN SERPL BCG-MCNC: 3.8 G/DL (ref 3.5–5.2)
ALBUMIN UR-MCNC: 30 MG/DL
ALP SERPL-CCNC: 123 U/L (ref 40–150)
ALT SERPL W P-5'-P-CCNC: 10 U/L (ref 0–70)
ANION GAP SERPL CALCULATED.3IONS-SCNC: 8 MMOL/L (ref 7–15)
APPEARANCE UR: CLEAR
AST SERPL W P-5'-P-CCNC: 22 U/L (ref 0–45)
BACTERIA #/AREA URNS HPF: ABNORMAL /HPF
BILIRUB SERPL-MCNC: 0.5 MG/DL
BILIRUB UR QL STRIP: NEGATIVE
BUN SERPL-MCNC: 18.8 MG/DL (ref 6–20)
CALCIUM SERPL-MCNC: 9 MG/DL (ref 8.8–10.4)
CAOX CRY #/AREA URNS HPF: ABNORMAL /HPF
CHLORIDE SERPL-SCNC: 102 MMOL/L (ref 98–107)
CHOLEST SERPL-MCNC: 130 MG/DL
COLOR UR AUTO: YELLOW
CREAT SERPL-MCNC: 1.26 MG/DL (ref 0.67–1.17)
EGFRCR SERPLBLD CKD-EPI 2021: 74 ML/MIN/1.73M2
ERYTHROCYTE [DISTWIDTH] IN BLOOD BY AUTOMATED COUNT: 14.9 % (ref 10–15)
FASTING STATUS PATIENT QL REPORTED: NO
FASTING STATUS PATIENT QL REPORTED: NO
GLUCOSE SERPL-MCNC: 120 MG/DL (ref 70–99)
GLUCOSE UR STRIP-MCNC: NEGATIVE MG/DL
HBA1C MFR BLD: 5.7 % (ref 0–5.6)
HCO3 SERPL-SCNC: 27 MMOL/L (ref 22–29)
HCT VFR BLD AUTO: 36 % (ref 40–53)
HDLC SERPL-MCNC: 48 MG/DL
HGB BLD-MCNC: 11.2 G/DL (ref 13.3–17.7)
HGB UR QL STRIP: NEGATIVE
HOLD SPECIMEN: NORMAL
KETONES UR STRIP-MCNC: NEGATIVE MG/DL
LDLC SERPL CALC-MCNC: 63 MG/DL
LEUKOCYTE ESTERASE UR QL STRIP: ABNORMAL
MCH RBC QN AUTO: 24.9 PG (ref 26.5–33)
MCHC RBC AUTO-ENTMCNC: 31.1 G/DL (ref 31.5–36.5)
MCV RBC AUTO: 80 FL (ref 78–100)
MUCOUS THREADS #/AREA URNS LPF: PRESENT /LPF
NITRATE UR QL: POSITIVE
NONHDLC SERPL-MCNC: 82 MG/DL
PH UR STRIP: 5.5 [PH] (ref 5–7)
PLATELET # BLD AUTO: 260 10E3/UL (ref 150–450)
POTASSIUM SERPL-SCNC: 4.4 MMOL/L (ref 3.4–5.3)
PROT SERPL-MCNC: 7.7 G/DL (ref 6.4–8.3)
PSA SERPL DL<=0.01 NG/ML-MCNC: 0.58 NG/ML (ref 0–2.5)
RBC # BLD AUTO: 4.49 10E6/UL (ref 4.4–5.9)
RBC #/AREA URNS AUTO: ABNORMAL /HPF
SODIUM SERPL-SCNC: 137 MMOL/L (ref 135–145)
SP GR UR STRIP: >=1.03 (ref 1–1.03)
SQUAMOUS #/AREA URNS AUTO: ABNORMAL /LPF
TRIGL SERPL-MCNC: 94 MG/DL
UROBILINOGEN UR STRIP-ACNC: 2 E.U./DL
WBC # BLD AUTO: 9.4 10E3/UL (ref 4–11)
WBC #/AREA URNS AUTO: ABNORMAL /HPF

## 2024-08-22 PROCEDURE — 36415 COLL VENOUS BLD VENIPUNCTURE: CPT | Performed by: FAMILY MEDICINE

## 2024-08-22 PROCEDURE — 85027 COMPLETE CBC AUTOMATED: CPT | Performed by: FAMILY MEDICINE

## 2024-08-22 PROCEDURE — 87086 URINE CULTURE/COLONY COUNT: CPT | Performed by: FAMILY MEDICINE

## 2024-08-22 PROCEDURE — 83540 ASSAY OF IRON: CPT | Performed by: FAMILY MEDICINE

## 2024-08-22 PROCEDURE — 83036 HEMOGLOBIN GLYCOSYLATED A1C: CPT | Performed by: FAMILY MEDICINE

## 2024-08-22 PROCEDURE — 81001 URINALYSIS AUTO W/SCOPE: CPT | Performed by: FAMILY MEDICINE

## 2024-08-22 PROCEDURE — 82728 ASSAY OF FERRITIN: CPT | Performed by: FAMILY MEDICINE

## 2024-08-22 PROCEDURE — 99214 OFFICE O/P EST MOD 30 MIN: CPT | Performed by: FAMILY MEDICINE

## 2024-08-22 PROCEDURE — 83550 IRON BINDING TEST: CPT | Performed by: FAMILY MEDICINE

## 2024-08-22 PROCEDURE — 80053 COMPREHEN METABOLIC PANEL: CPT | Performed by: FAMILY MEDICINE

## 2024-08-22 PROCEDURE — G0103 PSA SCREENING: HCPCS | Performed by: FAMILY MEDICINE

## 2024-08-22 PROCEDURE — G2211 COMPLEX E/M VISIT ADD ON: HCPCS | Performed by: FAMILY MEDICINE

## 2024-08-22 PROCEDURE — 80061 LIPID PANEL: CPT | Performed by: FAMILY MEDICINE

## 2024-08-22 RX ORDER — CIPROFLOXACIN 500 MG/1
500 TABLET, FILM COATED ORAL 2 TIMES DAILY
Qty: 14 TABLET | Refills: 0 | Status: SHIPPED | OUTPATIENT
Start: 2024-08-22

## 2024-08-22 NOTE — PROGRESS NOTES
"  Assessment & Plan       ICD-10-CM    1. Dysuria  R30.0 UA Macroscopic with reflex to Microscopic and Culture - Clinic Collect     Urine Microscopic Exam     Urine Culture     Prostate Specific Antigen Screen     CBC with Platelets and Reflex to Iron Studies     ciprofloxacin (CIPRO) 500 MG tablet     Prostate Specific Antigen Screen     CBC with Platelets and Reflex to Iron Studies     Iron & Iron Binding Capacity     Ferritin      2. Encounter for lipid screening for cardiovascular disease  Z13.220 Lipid panel reflex to direct LDL Non-fasting    Z13.6 Lipid panel reflex to direct LDL Non-fasting      3. Gross hematuria  R31.0 US Renal Complete Non-Vascular     Comprehensive metabolic panel     Prostate Specific Antigen Screen     CBC with Platelets and Reflex to Iron Studies     Comprehensive metabolic panel     Prostate Specific Antigen Screen     CBC with Platelets and Reflex to Iron Studies     Iron & Iron Binding Capacity     Ferritin      4. Hyperglycemia  R73.9 Hemoglobin A1c     Hemoglobin A1c              The longitudinal plan of care for the diagnosis(es)/condition(s) as documented were addressed during this visit. Due to the added complexity in care, I will continue to support Parish in the subsequent management and with ongoing continuity of care.     BMI  Estimated body mass index is 27.67 kg/m  as calculated from the following:    Height as of this encounter: 1.791 m (5' 10.5\").    Weight as of this encounter: 88.7 kg (195 lb 9.6 oz).   Weight management plan: Discussed healthy diet and exercise guidelines      There are no Patient Instructions on file for this visit.    Sudeep Lugo is a 38 year old, presenting for the following health issues:  UTI      8/22/2024    10:49 AM   Additional Questions   Roomed by Shaina   Accompanied by none         8/22/2024    10:49 AM   Patient Reported Additional Medications   Patient reports taking the following new medications none     History of Present " "Illness       Reason for visit:  Blood in urine  Symptom onset:  3-7 days ago  Symptoms include:  Blood and painful urination  Symptom intensity:  Severe  Symptom progression:  Improving  Had these symptoms before:  No  What makes it worse:  Holding in my urine wheni get the urge to go  What makes it better:  Cranberry juice.????   He is taking medications regularly.         X 2 last Friday and Saturday  Dysuria  Blood in urine              Review of Systems  Constitutional, HEENT, cardiovascular, pulmonary, gi and gu systems are negative, except as otherwise noted.      Objective    /60   Pulse 77   Temp 97.9  F (36.6  C) (Temporal)   Resp 16   Ht 1.791 m (5' 10.5\")   Wt 88.7 kg (195 lb 9.6 oz)   SpO2 98%   BMI 27.67 kg/m    Body mass index is 27.67 kg/m .  Physical Exam  Constitutional:       General: He is not in acute distress.     Appearance: Normal appearance. He is well-developed. He is not ill-appearing.   HENT:      Head: Normocephalic and atraumatic.      Right Ear: External ear normal.      Left Ear: External ear normal.      Nose: Nose normal.   Eyes:      General: No scleral icterus.     Extraocular Movements: Extraocular movements intact.      Conjunctiva/sclera: Conjunctivae normal.   Cardiovascular:      Rate and Rhythm: Normal rate.   Pulmonary:      Effort: Pulmonary effort is normal.   Musculoskeletal:      Cervical back: Normal range of motion and neck supple.   Skin:     General: Skin is warm and dry.   Neurological:      Mental Status: He is alert and oriented to person, place, and time.   Psychiatric:         Behavior: Behavior normal.         Thought Content: Thought content normal.         Judgment: Judgment normal.                    Signed Electronically by: Bulmaro Lo MD    "

## 2024-08-23 LAB
BACTERIA UR CULT: NORMAL
FERRITIN SERPL-MCNC: 216 NG/ML (ref 31–409)
IRON BINDING CAPACITY (ROCHE): 236 UG/DL (ref 240–430)
IRON SATN MFR SERPL: 11 % (ref 15–46)
IRON SERPL-MCNC: 25 UG/DL (ref 61–157)

## 2024-08-26 ENCOUNTER — HOSPITAL ENCOUNTER (OUTPATIENT)
Facility: AMBULATORY SURGERY CENTER | Age: 39
Discharge: HOME | End: 2024-08-26
Attending: INTERNAL MEDICINE | Admitting: INTERNAL MEDICINE
Payer: COMMERCIAL

## 2024-08-26 VITALS
HEART RATE: 84 BPM | SYSTOLIC BLOOD PRESSURE: 112 MMHG | OXYGEN SATURATION: 98 % | TEMPERATURE: 97.1 F | DIASTOLIC BLOOD PRESSURE: 70 MMHG | RESPIRATION RATE: 16 BRPM

## 2024-08-26 DIAGNOSIS — Z12.11 SCREEN FOR COLON CANCER: Primary | ICD-10-CM

## 2024-08-26 LAB — COLONOSCOPY: NORMAL

## 2024-08-26 PROCEDURE — 45378 DIAGNOSTIC COLONOSCOPY: CPT | Mod: 74

## 2024-08-26 PROCEDURE — G8907 PT DOC NO EVENTS ON DISCHARG: HCPCS

## 2024-08-26 PROCEDURE — G8918 PT W/O PREOP ORDER IV AB PRO: HCPCS

## 2024-08-26 RX ORDER — ONDANSETRON 2 MG/ML
4 INJECTION INTRAMUSCULAR; INTRAVENOUS EVERY 6 HOURS PRN
Status: DISCONTINUED | OUTPATIENT
Start: 2024-08-26 | End: 2024-08-27 | Stop reason: HOSPADM

## 2024-08-26 RX ORDER — LIDOCAINE 40 MG/G
CREAM TOPICAL
Status: DISCONTINUED | OUTPATIENT
Start: 2024-08-26 | End: 2024-08-27 | Stop reason: HOSPADM

## 2024-08-26 RX ORDER — NALOXONE HYDROCHLORIDE 0.4 MG/ML
0.2 INJECTION, SOLUTION INTRAMUSCULAR; INTRAVENOUS; SUBCUTANEOUS
Status: DISCONTINUED | OUTPATIENT
Start: 2024-08-26 | End: 2024-08-27 | Stop reason: HOSPADM

## 2024-08-26 RX ORDER — PROCHLORPERAZINE MALEATE 10 MG
10 TABLET ORAL EVERY 6 HOURS PRN
Status: DISCONTINUED | OUTPATIENT
Start: 2024-08-26 | End: 2024-08-27 | Stop reason: HOSPADM

## 2024-08-26 RX ORDER — FLUMAZENIL 0.1 MG/ML
0.2 INJECTION, SOLUTION INTRAVENOUS
Status: DISCONTINUED | OUTPATIENT
Start: 2024-08-26 | End: 2024-08-27 | Stop reason: HOSPADM

## 2024-08-26 RX ORDER — BISACODYL 5 MG/1
TABLET, DELAYED RELEASE ORAL
Qty: 4 TABLET | Refills: 0 | Status: SHIPPED | OUTPATIENT
Start: 2024-08-26

## 2024-08-26 RX ORDER — ONDANSETRON 4 MG/1
4 TABLET, ORALLY DISINTEGRATING ORAL EVERY 6 HOURS PRN
Status: DISCONTINUED | OUTPATIENT
Start: 2024-08-26 | End: 2024-08-27 | Stop reason: HOSPADM

## 2024-08-26 RX ORDER — NALOXONE HYDROCHLORIDE 0.4 MG/ML
0.4 INJECTION, SOLUTION INTRAMUSCULAR; INTRAVENOUS; SUBCUTANEOUS
Status: DISCONTINUED | OUTPATIENT
Start: 2024-08-26 | End: 2024-08-27 | Stop reason: HOSPADM

## 2024-08-26 RX ORDER — ONDANSETRON 2 MG/ML
4 INJECTION INTRAMUSCULAR; INTRAVENOUS
Status: DISCONTINUED | OUTPATIENT
Start: 2024-08-26 | End: 2024-08-27 | Stop reason: HOSPADM

## 2024-08-26 RX ORDER — FENTANYL CITRATE 50 UG/ML
INJECTION, SOLUTION INTRAMUSCULAR; INTRAVENOUS PRN
Status: DISCONTINUED | OUTPATIENT
Start: 2024-08-26 | End: 2024-08-26 | Stop reason: HOSPADM

## 2024-08-26 NOTE — LETTER
[facility] [place]  [address]  [place] [address]  [phone]          June 23, 2024    Parish Carter                                                                                                                     [address] [facility] [address]            Dear Parish,    Colonoscopy       Procedure date: 7.1.24     Anticipated arrival time: 1 PM  (Please note that arrival times may change)     Facility location: [facility]; [address] Floor, [place] [address] - Check in location: 2nd Floor at Surgery desk        Important Procedure Reminders:      Prep Instructions:   Instructions on how to prepare for your upcoming procedure are found below. Please read instructions carefully. Deviation from instructions may result in less than desired outcomes and procedure may need to be rescheduled.   If you have additional questions regarding how to prepare for your upcoming procedure, contact our endoscopy pre assessment nurses at [phone] option 4 Monday through Friday 7:00am-5:00pm      Policy:   The medications used during the procedure will make you sleepy, so you won't be able to drive. On the day of your procedure, please have an adult ready to drive you home and stay with you for the next 6 hours.   You can't use public transportation, ride-share services, or non-medical taxi services without a responsible caregiver. Medical transport services are okay, but a caregiver must be there to receive you at your destination.   Make sure your  and caregiver are confirmed before your procedure.        Day of procedure:  Please keep in mind that arrival times may change. Let your  know there might be a one-hour window for changes.  We ask that you please check in at the  with your . Your  should remain on campus.  Expect to be at the procedure center for about 1.5-2.5 hours.    Please do not wear  jewelry (i.e. earrings, rings, necklaces, watches, etc). Leave your purse, billfold, credit cards, and other valuables at home.   Bring insurance card and ID.      To cancel or reschedule your procedure:   Within 14 days of your procedure if you develop any flu-like symptoms (such as fever, cough, shortness of breath), COVID-19 like symptoms or exposure to COVID-19, contact our endoscopy team at [phone] option 4 to determine if procedure can be completed or needs to be delayed.   If you need to cancel or reschedule, our endoscopy scheduling team can be reached at [phone], option 2. Monday through Friday, 7:00am-5:00pm.        Medication Reminders:     Please note the following medication holding recommendations:   N/A     ----------------------------------------------------------------------------------------------------------------------------------------------------------------------------------------------------------------------------------------------------------------------        Extended Colonoscopy Prep - Two Day Full Prep  Prep instructions for colonoscopy   Pre-Assessment Phone Number: [facility]; [phone] option 4        Please read these instructions carefully at least 7 days prior to your colonoscopy procedure. Be sure to follow all directions completely. The inside of your colon must be clean to allow for a complete examination for the presence of any growths, polyps, and/or abnormalities, as well as their biopsy or removal. A number of tips are included in order to make this part of the procedure as comfortable as possible.     Immediately:  A nurse will call you approximately 1 week before your exam to prescribe your bowel prep and review the prep instructions with you.   You must arrange for an adult to drive you home after your exam. Your colonoscopy cannot be done unless you have a ride. If you need to use public transportation, someone must ride with you  and stay with you for a minimum of 6-24 hours.  Check with your insurance company to be sure they will cover this exam.Arrange for a responsible adult to drive you home on the day of the exam. This cannot be a taxi or a bus as you will need someone with you after the procedure.     7 days prior:  Talk to your prescribing doctor: If you take blood thinners (such as Coumadin, Plavix, Xarelto, Eliquis, Lovenox, or others), these medications may need to be stopped temporarily before your procedure. Your prescribing doctor will tell you what to do.   If you have diabetes, you should request an early morning appointment.  Talk to your prescribing provider: If you take prescription NSAIDS (such as Sulindac, Celebrex, Mobic, Relafen). Your prescribing provider will tell you what to do.  Stop taking fiber supplements and multivitamins containing iron, or any other medications containing iron.  Fill your prescription for 2 container of Golytely and 4 Dulcolax tablets at the pharmacy.  It is very important that you stay well hydrated during the colonoscopy prep. The Golytely bowel prep is designed to clean out your colon, but it will not provide hydration. While you are taking the prescribed prep, you should also drink 64 oz. of Gatorade or similar sports drink product to drink for staying hydrated. (Avoid red and purple colors)  Stop eating corn, popcorn, nuts and foods with seeds.      7 days prior:  Begin a restricted or low fiber diet (see examples below).  Begin taking Miralax 17 grams (1 capful) twice daily in 8 ounces of a clear liquid.      3 days prior:   Begin a clear liquid diet. (see list below).        2 days prior:  Stop taking NSAID pain relievers, such as Advil, Aleve, Ibuprofen, Naproxen, and Motrin.  Drink fluids to be well hydrated, this is important. Drink at least 4 large glasses of water, Gatorade, or other similar sports drinks.  It is a good idea to use Vaseline on the skin around your anus after each  bowel movement to prevent irritation. Wet wipes also help to reduce irritation.   At 4pm, take 2 Dulcolax (bisacodyl) tablets.  At 5pm, mix and drink half of a jug of Golytely bowel prep. Drink an 8 oz. Glass of Golytely every 15 minutes until HALF of the jug is gone. Place the remainder of the Golytely in the refrigerator. Stay close to the bathroom.      1 day prior:  Continue clear liquid diet only (see examples below). Do not eat solid food this day.  Do not drink any red or purple colored drinks.  Stop taking NSAID pain relievers, such as Advil, Ibuprofen, Motrin, etc.  You may take Tylenol.  At 10 am, take 2 Dulcolax (bisacodyl) tablets.  At 3 pm, drink the 2nd half of a jug of Golytely bowel prep. Drink an 8 oz. glass of Golytely every 10-15 minutes until the 1st jug of Golytely is gone.   The Golytely bowel prep will not keep you hydrated. You should drink 8-10 glasses of clear liquids throughout the day.  At 8pm, mix and drink half of second jug of Golytely bowel prep. Drink an 8 oz. Glass of Golytely every 15 minutes until HALF of the jug is gone. Place the remainder of the Golytely in the refrigerator. Stay close to the bathroom.         Procedure day:  6 hours before your check-in time, drink an 8 oz. Glass of Golytely every 15 minutes until the 2nd jug of Golytely is gone.  You may take your necessary morning medications with sips of water.  Do not smoke, chew tobacco, or swallow anything, including water or gum for at least 2 hours before your colonoscopy. This is a safety issue. Your procedure could be cancelled if you do not follow directions.  Please do not wear jewelry (i.e. earrings, rings, necklaces, watches, etc) . Leave your purse, billfold, credit cards, and other valuables at home.   Please arrive with a responsible adult who can take you home after the test and stay with you for a minimum of 24 hours: The medicine used will make you sleepy and forgetful. If you do not have someone to take  you home, we will cancel your procedure. If using public transportation you must have someone to ride with you.  Please perform your nebulizer treatments and airway clearance therapy in the morning prior to the procedure (if applicable).  If you have asthma, bring your inhalers.         CLEAR LIQUID DIET   You may have:  Water, tea, coffee (no milk or cream)  Soda pop, Gatorade (not red or purple)  Jell-O, Popsicles (no milk or fruit pieces - not red or purple)  Fat-free soup broth or bouillon  Plain hard candy, such as clear life savers (not red or purple)  Clear juices and fruit-flavored drinks, such as apple juice, white grape juice, Hi-C, and Sonido-Aid (not red or purple)    Do not have:  Milk or milk products such as ice cream, malts or shakes, or coffee creamer  Red or purple drinks of any kind such as cranberry juice or grape juice. Avoid red or purple Jell-O, Popsicles, Sonido-Aid, sorbet, sherbet and candy  Juices with pulp such as orange, grapefruit, pineapple or tomato juice  Cream soups of any kind  Alcohol and beer  Protein drinks or protein powder          LOW FIBER DIET   You may have:     Starches: White bread, rolls, biscuits, croissants, Buffalo toast, white flour tortillas, waffles, pancakes, Slovak toast; white rice, noodles, pasta, macaroni; cooked and peeled potatoes; plain crackers, saltines; cooked farina or cream of rice; puffed rice, corn flakes, Rice Krispies, Special K   Vegetables: tender cooked and canned, vegetable broths  Fruits and fruit juices: Strained fruit juice, canned fruit without seeds or skin (not pineapple), applesauce, pear sauce, ripe bananas, melons (not watermelon)   Milk products: Milk (plain or flavored), cheese, cottage cheese, yogurt (no berries), custard, ice cream    Proteins: Tender, well-cooked ground beef, lamb, veal, ham, pork, chicken, turkey, fish or organ meats; eggs; creamy peanut butter   Fats and condiments:  Margarine, butter, oils, mayonnaise, sour cream,  salad dressing, plain gravy; spices, cooked herbs; sugar, clear jelly, honey, syrup   Snacks, sweets and drinks: Pretzels, hard candy; plain cakes and cookies (no nuts or seeds); gelatin, plain pudding, sherbet, Popsicles; coffee, tea, carbonated ( fizzy ) drinks Do not have:     Starches: Breads or rolls that contain nuts, seeds or fruit; whole wheat or whole grain breads that contain more than 1 gram of fiber per slice; cornbread; corn or whole wheat tortillas; potatoes with skin; brown rice, wild rice, kasha (buckwheat), and oatmeal   Vegetables: Any raw or steamed vegetables; vegetables with seeds; corn in any form   Fruits and fruit juices: Prunes, prune juice, raisins and other dried fruits, berries and other fruits with seeds, canned pineapple juices with pulp such as orange, grapefruit, pineapple or tomato juice  Milk products: Any yogurt with nuts, seeds or berries   Proteins: Tough, fibrous meats with gristle; cooked dried beans, peas or lentils; crunchy peanut butter  Fats and condiments: Pickles, olives, relish, horseradish; jam, marmalade, preserves   Snacks, sweets and drinks: Popcorn, nuts, seeds, granola, coconut, candies made with nuts or seeds; all desserts that contain nuts, seeds, raisins and other dried fruits, coconut, whole grains or bran.       FAQ:     How do you know if your colon is cleaned out?   After completing the bowel prep, your bowel movements should be all liquid and yellow. Your bowel movements will look similar to urine in the toilet. If there are pieces of stool (poop) in the toilet, or if you can't see to the bottom of the toilet, please call our office for advice. Call [phone] and ask to speak with a nurse.   Why do you need a responsible  to take you home and stay with you?  We require a responsible adult to take you home for your safety. The sedation medicines used to relax you during the procedure can impair your judgement and reaction time, make you forgetful  and possible a little unsteady. Do not drive, make any important decisions, or sign any legal documents for 24 hours after your procedure.   It is normal to feel bloated and gassy after your procedure. Walking will help move the air through your colon. You can take non-aspirin pain relievers that contain acetaminophen (Tylenol).   When can you eat after your procedure?  You may resume your normal diet when you feel ready, unless advised otherwise by the doctor performing your procedure. Do not drink alcohol for 24 hours after your procedure.   You many resume normal activities (work, exercise, etc.) after 24 hours.   When will you get test results?  You should have your procedure results and any lab results (if applicable) by letter, MyChart message, or phone call within 2 weeks. If you have any questions, please call the doctor that referred you for the procedure.         Thank you for choosing  [facility] for your procedure. If you are sent a survey regarding your care, please take the time to complete the questionnaire. We value your feedback!

## 2024-08-26 NOTE — H&P
ENDOSCOPY PRE-SEDATION H&P FOR OUTPATIENT PROCEDURES    Parish Carter  [id number]  1985    Procedure: colonoscopy    Pre-procedure diagnosis: CRCS    Past medical history:   Past Medical History:   Diagnosis Date    Substance abuse (H)     IV Heroin for 2 1/2 years.     Tobacco abuse        Past surgical history:   Past Surgical History:   Procedure Laterality Date    RELEASE CARPAL TUNNEL Left 2/27/2020    Procedure: RELEASE, CARPAL TUNNEL, Left;  Surgeon: Parish Lin MD;  Location:  OR       Current Outpatient Medications   Medication Sig Dispense Refill    ciprofloxacin (CIPRO) 500 MG tablet Take 1 tablet (500 mg) by mouth 2 times daily. 14 tablet 0    linaclotide (LINZESS) 145 MCG capsule Take 1 capsule (145 mcg) by mouth every morning (before breakfast) 30 capsule 0    lubiprostone (AMITIZA) 24 MCG capsule Take 1 capsule (24 mcg) by mouth 2 times daily (with meals) 60 capsule 1    bisacodyl (DULCOLAX) 5 MG EC tablet Take as directed. One day before the exam at 4 PM, take 2 Dulcolax (Bisacodyl) tablets.  At 10 PM take 2 Dulcolax (Bisacodyl) tablets. (Patient not taking: Reported on 8/22/2024) 4 tablet 0    bisacodyl (DULCOLAX) 5 MG EC tablet Two days prior to exam take two (2) tablets at 4pm. One day prior to exam take two (2) tablets at 4pm (Patient not taking: Reported on 8/22/2024) 4 tablet 0    METAMUCIL FIBER PO Take by mouth 2 times daily      polyethylene glycol (GOLYTELY) 236 g suspension 2 days before procedure at 5 PM fill first container with water. Mix and drink an 8 oz glass of prep every 15 minutes until HALF of the container is gone. Put remainder in the refrigerator. One day before procedure at 3 PM, drink the remaining prep one 8 oz glass every 15 minutes until the container is empty. At 8 PM fill the second container with water. Mix and drink an 8 oz glass of prep every 15 minutes until HALF of the container is gone. Put remainder in the refrigerator. Day of procedure 6 hours  before arrival time, drink the remaining prep one 8 oz glass every 15 minutes until the container is empty. (Patient not taking: Reported on 8/22/2024) 8000 mL 0    polyethylene glycol (GOLYTELY) 236 g suspension Take as directed. Two days before your exam fill the first container with water. Cover and shake until mixed well. At 5:00pm drink one 8oz glass every 10-15 minutes until half (1/2) of the first container is empty. Store the remainder in the refrigerator. One day before your exam at 5:00pm drink the second half of the first container until it is gone. Before you go to bed mix the second container with water and put in refrigerator. Six hours before your check in time drink one 8oz glass every 10-15 minutes until half of container is empty. Discard the remainder of solution. (Patient not taking: Reported on 8/22/2024) 8000 mL 0    polyethylene glycol (MIRALAX) 17 GM/Dose powder Day before exam at 5 PM start drinking an 8-ounce glass of the Miralax and Gatorade mixture every 15 minutes until the pitcher is HALF empty.  Store the rest in the refrigerator. At 10 PM start drinking an 8-ounce glass of Miralax and Gatorade mixture every 15 minutes until the pitcher is empty (about 4 glasses). (Patient not taking: Reported on 8/22/2024) 238 g 0     Current Facility-Administered Medications   Medication Dose Route Frequency Provider Last Rate Last Admin    fentaNYL (PF) (SUBLIMAZE) injection    PRN Arron Ng MD   100 mcg at 08/26/24 1331    lidocaine (LMX4) kit   Topical Q1H PRN Arron Ng MD        lidocaine 1 % 0.1-1 mL  0.1-1 mL Other Q1H PRN Arron Ng MD        midazolam (VERSED) injection    PRN Arron Ng MD   2 mg at 08/26/24 1331    ondansetron (ZOFRAN) injection 4 mg  4 mg Intravenous Once PRN Arron Ng MD        sodium chloride (PF) 0.9% PF flush 3 mL  3 mL Intracatheter Q8H Arron Ng MD        sodium  chloride (PF) 0.9% PF flush 3 mL  3 mL Intracatheter q1 min Arron Womack MD           No Known Allergies    History of Anesthesia/Sedation Problems: no    PHYSICAL EXAMINATION:  Constitutional: aaox3, cooperative, pleasant  Vitals reviewed: /73   Pulse 84   Temp 97.1  F (36.2  C) (Temporal)   Resp 16   SpO2 100%   Wt:   Wt Readings from Last 2 Encounters:   08/22/24 88.7 kg (195 lb 9.6 oz)   03/26/24 98.3 kg (216 lb 11.2 oz)      Eyes: Sclera anicteric/injected  Ears/nose/mouth/throat: Normal oropharynx without ulcers or exudate, mucus membranes moist, hearing intact  Neck: supple, thyroid normal size  CV: No edema  Respiratory: Unlabored breathing  Lymph: No submandibular, supraclavicular or inguinal lymphadenopathy  Abd: Nondistended, no masses, nontender  Skin: warm, perfused, no jaundice  Psych: Normal affect  MSK: normal movement on limited exam.    ASA Score: See Provation note    Assessment/Plan:     The patient is an appropriate candidate to receive sedation.    Informed consent was discussed with the patient/family, including the risks, benefits, potential complications and any alternative options associated with sedation.    Patient assessment completed just prior to sedation and while under constant observation by the provider. Condition determined to be adequate for proceeding with sedation.    The specific risks for the procedure were discussed with the patient at the time of informed consent and include but are not limited to perforation which could require surgery, missing significant neoplasm or lesion, hemorrhage and adverse sedative complication.      Arron Ng MD

## 2024-08-27 ENCOUNTER — ANESTHESIA (OUTPATIENT)
Dept: SURGERY | Facility: AMBULATORY SURGERY CENTER | Age: 39
End: 2024-08-27
Payer: COMMERCIAL

## 2024-08-27 ENCOUNTER — ANESTHESIA EVENT (OUTPATIENT)
Dept: SURGERY | Facility: AMBULATORY SURGERY CENTER | Age: 39
End: 2024-08-27
Payer: COMMERCIAL

## 2024-08-27 ENCOUNTER — HOSPITAL ENCOUNTER (OUTPATIENT)
Facility: AMBULATORY SURGERY CENTER | Age: 39
Discharge: HOME | End: 2024-08-27
Attending: INTERNAL MEDICINE
Payer: COMMERCIAL

## 2024-08-27 VITALS
DIASTOLIC BLOOD PRESSURE: 74 MMHG | SYSTOLIC BLOOD PRESSURE: 116 MMHG | HEART RATE: 91 BPM | RESPIRATION RATE: 16 BRPM | OXYGEN SATURATION: 99 %

## 2024-08-27 VITALS — HEART RATE: 86 BPM

## 2024-08-27 DIAGNOSIS — K59.09 CHRONIC CONSTIPATION: ICD-10-CM

## 2024-08-27 LAB — COLONOSCOPY: NORMAL

## 2024-08-27 PROCEDURE — 45380 COLONOSCOPY AND BIOPSY: CPT | Mod: 74

## 2024-08-27 PROCEDURE — 88305 TISSUE EXAM BY PATHOLOGIST: CPT | Performed by: PATHOLOGY

## 2024-08-27 PROCEDURE — 45380 COLONOSCOPY AND BIOPSY: CPT | Performed by: NURSE ANESTHETIST, CERTIFIED REGISTERED

## 2024-08-27 PROCEDURE — G8907 PT DOC NO EVENTS ON DISCHARG: HCPCS

## 2024-08-27 PROCEDURE — G8918 PT W/O PREOP ORDER IV AB PRO: HCPCS

## 2024-08-27 PROCEDURE — 88341 IMHCHEM/IMCYTCHM EA ADD ANTB: CPT | Performed by: PATHOLOGY

## 2024-08-27 PROCEDURE — 45380 COLONOSCOPY AND BIOPSY: CPT | Performed by: ANESTHESIOLOGY

## 2024-08-27 PROCEDURE — 88342 IMHCHEM/IMCYTCHM 1ST ANTB: CPT | Performed by: PATHOLOGY

## 2024-08-27 RX ORDER — PROPOFOL 10 MG/ML
INJECTION, EMULSION INTRAVENOUS PRN
Status: DISCONTINUED | OUTPATIENT
Start: 2024-08-27 | End: 2024-08-27

## 2024-08-27 RX ORDER — SODIUM CHLORIDE, SODIUM LACTATE, POTASSIUM CHLORIDE, CALCIUM CHLORIDE 600; 310; 30; 20 MG/100ML; MG/100ML; MG/100ML; MG/100ML
INJECTION, SOLUTION INTRAVENOUS CONTINUOUS PRN
Status: DISCONTINUED | OUTPATIENT
Start: 2024-08-27 | End: 2024-08-27

## 2024-08-27 RX ORDER — LIDOCAINE HYDROCHLORIDE 20 MG/ML
INJECTION, SOLUTION INFILTRATION; PERINEURAL PRN
Status: DISCONTINUED | OUTPATIENT
Start: 2024-08-27 | End: 2024-08-27

## 2024-08-27 RX ORDER — LUBIPROSTONE 24 UG/1
24 CAPSULE ORAL 2 TIMES DAILY WITH MEALS
Qty: 60 CAPSULE | Refills: 1 | Status: SHIPPED | OUTPATIENT
Start: 2024-08-27

## 2024-08-27 RX ORDER — PROPOFOL 10 MG/ML
INJECTION, EMULSION INTRAVENOUS CONTINUOUS PRN
Status: DISCONTINUED | OUTPATIENT
Start: 2024-08-27 | End: 2024-08-27

## 2024-08-27 RX ADMIN — PROPOFOL 150 MCG/KG/MIN: 10 INJECTION, EMULSION INTRAVENOUS at 14:03

## 2024-08-27 RX ADMIN — PROPOFOL 30 MG: 10 INJECTION, EMULSION INTRAVENOUS at 14:05

## 2024-08-27 RX ADMIN — SODIUM CHLORIDE, SODIUM LACTATE, POTASSIUM CHLORIDE, CALCIUM CHLORIDE: 600; 310; 30; 20 INJECTION, SOLUTION INTRAVENOUS at 13:56

## 2024-08-27 RX ADMIN — LIDOCAINE HYDROCHLORIDE 60 MG: 20 INJECTION, SOLUTION INFILTRATION; PERINEURAL at 14:02

## 2024-08-27 RX ADMIN — PROPOFOL 50 MG: 10 INJECTION, EMULSION INTRAVENOUS at 14:04

## 2024-08-27 RX ADMIN — PROPOFOL 100 MG: 10 INJECTION, EMULSION INTRAVENOUS at 14:03

## 2024-08-27 ASSESSMENT — LIFESTYLE VARIABLES: TOBACCO_USE: 1

## 2024-08-27 NOTE — ANESTHESIA PREPROCEDURE EVALUATION
Anesthesia Pre-Procedure Evaluation    Patient: Parish Carter   MRN: [id number] : 1985        Procedure : Procedure(s):  Colonoscopy with CO2 insufflation  COLONOSCOPY, WITH POLYPECTOMY AND BIOPSY          Past Medical History:   Diagnosis Date     Substance abuse (H)     IV Heroin for 2 1/2 years.      Tobacco abuse       Past Surgical History:   Procedure Laterality Date     RELEASE CARPAL TUNNEL Left 2020    Procedure: RELEASE, CARPAL TUNNEL, Left;  Surgeon: Parish Lin MD;  Location: [facility] OR      No Known Allergies   Social History     Tobacco Use     Smoking status: Light Smoker     Current packs/day: 0.25     Average packs/day: 0.3 packs/day for 15.0 years (3.8 ttl pk-yrs)     Types: Cigarettes     Smokeless tobacco: Never     Tobacco comments:     Trying to quit.   Substance Use Topics     Alcohol use: No      Wt Readings from Last 1 Encounters:   24 88.7 kg (195 lb 9.6 oz)        Anesthesia Evaluation   Pt has had prior anesthetic. Type: General and MAC.        ROS/MED HX  ENT/Pulmonary:     (+)                tobacco use,   3  Pack-Year Hx,                      Neurologic:  - neg neurologic ROS     Cardiovascular:  - neg cardiovascular ROS     METS/Exercise Tolerance:     Hematologic:  - neg hematologic  ROS     Musculoskeletal:  - neg musculoskeletal ROS     GI/Hepatic: Comment: Chronic cinstipation      Renal/Genitourinary:  - neg Renal ROS  (-) nephrolithiasis   Endo:  - neg endo ROS     Psychiatric/Substance Use:     (+) psychiatric history    Recreational drug usage: Other (Comment).    Infectious Disease:  - neg infectious disease ROS     Malignancy:  - neg malignancy ROS     Other: Comment: Heroin use         Physical Exam    Airway  airway exam normal           Respiratory Devices and Support         Dental       (+) Completely normal teeth      Cardiovascular   cardiovascular exam normal          Pulmonary   pulmonary exam normal            OUTSIDE LABS:  CBC:   Lab Results  "  Component Value Date    WBC 9.4 08/22/2024    WBC 10.7 05/01/2018    HGB 11.2 (L) 08/22/2024    HGB 14.6 05/01/2018    HCT 36.0 (L) 08/22/2024    HCT 44.0 05/01/2018     08/22/2024     05/01/2018     BMP:   Lab Results   Component Value Date     08/22/2024     05/01/2018    POTASSIUM 4.4 08/22/2024    POTASSIUM 4.2 05/01/2018    CHLORIDE 102 08/22/2024    CHLORIDE 109 05/01/2018    CO2 27 08/22/2024    CO2 25 05/01/2018    BUN 18.8 08/22/2024    BUN 26 05/01/2018    CR 1.26 (H) 08/22/2024    CR 0.93 05/01/2018     (H) 08/22/2024    GLC 82 05/01/2018     COAGS: No results found for: \"PTT\", \"INR\", \"FIBR\"  POC: No results found for: \"BGM\", \"HCG\", \"HCGS\"  HEPATIC:   Lab Results   Component Value Date    ALBUMIN 3.8 08/22/2024    PROTTOTAL 7.7 08/22/2024    ALT 10 08/22/2024    AST 22 08/22/2024    ALKPHOS 123 08/22/2024    BILITOTAL 0.5 08/22/2024     OTHER:   Lab Results   Component Value Date    A1C 5.7 (H) 08/22/2024    LIZETT 9.0 08/22/2024    LIPASE 115 01/10/2016    TSH 0.52 05/01/2018       Anesthesia Plan    ASA Status:  3    NPO Status:  NPO Appropriate    Anesthesia Type: MAC.     - Reason for MAC: chronic cardiopulmonary disease   Induction: Intravenous, Propofol.   Maintenance: TIVA.        Consents    Anesthesia Plan(s) and associated risks, benefits, and realistic alternatives discussed. Questions answered and patient/representative(s) expressed understanding.     - Discussed:     - Discussed with:  Patient      - Extended Intubation/Ventilatory Support Discussed: No.      - Patient is DNR/DNI Status: No     Use of blood products discussed: No .     Postoperative Care    Post procedure pain management: None required.   PONV prophylaxis: Ondansetron (or other 5HT-3), Background Propofol Infusion, Dexamethasone or Solumedrol     Comments:             Angel Harper MD    I have reviewed the pertinent notes and labs in the chart from the past 30 days and (re)examined the patient. " " Any updates or changes from those notes are reflected in this note.              # Overweight: Estimated body mass index is 27.67 kg/m  as calculated from the following:    Height as of 8/22/24: 1.791 m (5' 10.5\").    Weight as of 8/22/24: 88.7 kg (195 lb 9.6 oz).      "

## 2024-08-27 NOTE — ANESTHESIA POSTPROCEDURE EVALUATION
Patient: Parish Carter    Procedure: Procedure(s):  Colonoscopy with CO2 insufflation  COLONOSCOPY, WITH POLYPECTOMY AND BIOPSY       Anesthesia Type:  MAC    Note:  Disposition: Outpatient   Postop Pain Control: Uneventful            Sign Out: Well controlled pain   PONV: No   Neuro/Psych: Uneventful            Sign Out: Acceptable/Baseline neuro status   Airway/Respiratory: Uneventful            Sign Out: Acceptable/Baseline resp. status   CV/Hemodynamics: Uneventful            Sign Out: Acceptable CV status; No obvious hypovolemia; No obvious fluid overload   Other NRE: NONE   DID A NON-ROUTINE EVENT OCCUR? No       Last vitals:  Vitals Value Taken Time   /62 08/27/24 1422   Temp     Pulse 88 08/27/24 1422   Resp 14 08/27/24 1422   SpO2 98 % 08/27/24 1422       Electronically Signed By: Angel Harper MD  August 27, 2024  2:32 PM

## 2024-08-27 NOTE — ANESTHESIA CARE TRANSFER NOTE
Patient: Parish Carter    Procedure: Procedure(s):  Colonoscopy with CO2 insufflation  COLONOSCOPY, WITH POLYPECTOMY AND BIOPSY       Diagnosis: Chronic constipation [K59.09]  Blood in stool [K92.1]  Diagnosis Additional Information: No value filed.    Anesthesia Type:   No value filed.     Note:      Level of Consciousness: awake  Oxygen Supplementation: room air    Independent Airway: airway patency satisfactory and stable  Dentition: dentition unchanged  Vital Signs Stable: post-procedure vital signs reviewed and stable  Report to RN Given: handoff report given  Patient transferred to: Phase II    Handoff Report: Identifed the Patient, Identified the Reponsible Provider, Reviewed the pertinent medical history, Discussed the surgical course, Reviewed Intra-OP anesthesia mangement and issues during anesthesia, Set expectations for post-procedure period and Allowed opportunity for questions and acknowledgement of understanding      Vitals:  Vitals Value Taken Time   BP 99/65    Temp 98    Pulse 92    Resp 16    SpO2 100        Electronically Signed By: DAMIAN Pitt CRNA  August 27, 2024  2:21 PM

## 2024-08-29 ENCOUNTER — ANCILLARY PROCEDURE (OUTPATIENT)
Dept: ULTRASOUND IMAGING | Facility: CLINIC | Age: 39
End: 2024-08-29
Attending: FAMILY MEDICINE
Payer: COMMERCIAL

## 2024-08-29 DIAGNOSIS — R31.0 GROSS HEMATURIA: ICD-10-CM

## 2024-08-29 PROCEDURE — 76770 US EXAM ABDO BACK WALL COMP: CPT | Mod: TC | Performed by: RADIOLOGY

## 2024-08-30 LAB
PATH REPORT.COMMENTS IMP SPEC: NORMAL
PATH REPORT.FINAL DX SPEC: NORMAL
PATH REPORT.GROSS SPEC: NORMAL
PATH REPORT.MICROSCOPIC SPEC OTHER STN: NORMAL
PATH REPORT.RELEVANT HX SPEC: NORMAL
PHOTO IMAGE: NORMAL

## 2024-09-05 DIAGNOSIS — K62.6 RECTAL ULCER: Primary | ICD-10-CM

## 2024-09-09 ENCOUNTER — TELEPHONE (OUTPATIENT)
Dept: GASTROENTEROLOGY | Facility: CLINIC | Age: 39
End: 2024-09-09
Payer: COMMERCIAL

## 2024-09-09 NOTE — TELEPHONE ENCOUNTER
SCREENING QUESTIONS DONE 7/1 NOTHING CHANGED PER PT   Preferred Pharmacy:    [id number] - [place], [place] - [address]  [address]  [place] [address]  Phone: [phone] Fax: [phone]    Final Scheduling Details     Procedure scheduled  Flexible sigmoidoscopy    Surgeon:       Date of procedure:  12/4     Pre-OP / PAC:   No - Not required for this site.    Location  [facility] - Patient preference.    Sedation   MAC/Deep Sedation - Per order.      Patient Reminders:   You will receive a call from a Nurse to review instructions and health history.  This assessment must be completed prior to your procedure.  Failure to complete the Nurse assessment may result in the procedure being cancelled.      On the day of your procedure, please designate an adult(s) who can drive you home stay with you for the next 24 hours. The medicines used in the exam will make you sleepy. You will not be able to drive.      You cannot take public transportation, ride share services, or non-medical taxi service without a responsible caregiver.  Medical transport services are allowed with the requirement that a responsible caregiver will receive you at your destination.  We require that drivers and caregivers are confirmed prior to your procedure.

## 2024-10-13 ENCOUNTER — HEALTH MAINTENANCE LETTER (OUTPATIENT)
Age: 39
End: 2024-10-13

## 2024-11-21 ENCOUNTER — TELEPHONE (OUTPATIENT)
Dept: GASTROENTEROLOGY | Facility: CLINIC | Age: 39
End: 2024-11-21
Payer: COMMERCIAL

## 2024-11-21 NOTE — TELEPHONE ENCOUNTER
Pre visit planning completed.      Procedure details:    Patient scheduled for Flexible sigmoidoscopy on 12/4/24.     Arrival time: 1430. Procedure time 1530    Facility location: [facility]; [address] [place] [address], [address] Floor, [place] [address]. Check in location: 5th Floor.    Sedation type: MAC    Pre op exam needed? No.    Indication for procedure:   Rectal ulcer            Chart review:     Electronic implanted devices? No    Recent diagnosis of diverticulitis within the last 6 weeks? No      Medication review:    Diabetic? No    Anticoagulants? No    Weight loss medication/injectable? No GLP-1 medication per patient's medication list. Nursing to verify with pre-assessment call.    Other medication HOLDING recommendations:  N/A      Prep for procedure:     Bowel prep recommendation: Enemas  Due to: standard bowel prep.    Prep instructions sent via [employer]         Rochelle Buenrostro RN  Endoscopy Procedure Pre Assessment   [phone] option 2

## 2024-11-21 NOTE — TELEPHONE ENCOUNTER
Pre assessment completed for upcoming procedure.      Procedure details:    Patient scheduled for Flexible sigmoidoscopy on 12/4/24.     Arrival time: 1430. Procedure time 1530    Facility location: [facility]; [address] [place] [address], [address] Floor, [place] [address]. Check in location: 5th Floor.    Sedation type: MAC    Pre op exam needed? No.    Indication for procedure: rectal ulcer    Designated  policy reviewed. Instructed to have someone stay 24 hours post procedure.       Chart review:     Electronic implanted devices? No    Recent diagnosis of diverticulitis within the last 6 weeks?  No      Medication review:    Diabetic? No    Anticoagulants? No    Weight loss medication/injectable? No.    Other medication HOLDING recommendations:  N/A      Prep for procedure:     Bowel prep recommendation: Enemas  Due to: standard bowel prep.    Prep instructions sent via [employer]     Reviewed procedure prep instructions.     Patient verbalized understanding and had no questions or concerns at this time.        Leia Olmedo RN  Endoscopy Procedure Pre Assessment   [phone] option 2

## 2024-12-02 RX ORDER — NALOXONE HYDROCHLORIDE 0.4 MG/ML
0.4 INJECTION, SOLUTION INTRAMUSCULAR; INTRAVENOUS; SUBCUTANEOUS
Status: CANCELLED | OUTPATIENT
Start: 2024-12-02

## 2024-12-02 RX ORDER — FLUMAZENIL 0.1 MG/ML
0.2 INJECTION, SOLUTION INTRAVENOUS
Status: CANCELLED | OUTPATIENT
Start: 2024-12-02 | End: 2024-12-03

## 2024-12-02 RX ORDER — PROCHLORPERAZINE MALEATE 10 MG
10 TABLET ORAL EVERY 6 HOURS PRN
Status: CANCELLED | OUTPATIENT
Start: 2024-12-02

## 2024-12-02 RX ORDER — NALOXONE HYDROCHLORIDE 0.4 MG/ML
0.2 INJECTION, SOLUTION INTRAMUSCULAR; INTRAVENOUS; SUBCUTANEOUS
Status: CANCELLED | OUTPATIENT
Start: 2024-12-02

## 2024-12-02 RX ORDER — ONDANSETRON 2 MG/ML
4 INJECTION INTRAMUSCULAR; INTRAVENOUS EVERY 6 HOURS PRN
Status: CANCELLED | OUTPATIENT
Start: 2024-12-02

## 2024-12-02 RX ORDER — ONDANSETRON 4 MG/1
4 TABLET, ORALLY DISINTEGRATING ORAL EVERY 6 HOURS PRN
Status: CANCELLED | OUTPATIENT
Start: 2024-12-02

## 2024-12-03 RX ORDER — OXYCODONE HYDROCHLORIDE 5 MG/1
10 TABLET ORAL
Status: CANCELLED | OUTPATIENT
Start: 2024-12-03

## 2024-12-03 RX ORDER — ONDANSETRON 4 MG/1
4 TABLET, ORALLY DISINTEGRATING ORAL EVERY 30 MIN PRN
Status: CANCELLED | OUTPATIENT
Start: 2024-12-03

## 2024-12-03 RX ORDER — NALOXONE HYDROCHLORIDE 0.4 MG/ML
0.1 INJECTION, SOLUTION INTRAMUSCULAR; INTRAVENOUS; SUBCUTANEOUS
Status: CANCELLED | OUTPATIENT
Start: 2024-12-03

## 2024-12-03 RX ORDER — FENTANYL CITRATE 50 UG/ML
50 INJECTION, SOLUTION INTRAMUSCULAR; INTRAVENOUS EVERY 5 MIN PRN
Status: CANCELLED | OUTPATIENT
Start: 2024-12-03

## 2024-12-03 RX ORDER — FENTANYL CITRATE 50 UG/ML
25 INJECTION, SOLUTION INTRAMUSCULAR; INTRAVENOUS EVERY 5 MIN PRN
Status: CANCELLED | OUTPATIENT
Start: 2024-12-03

## 2024-12-03 RX ORDER — ONDANSETRON 2 MG/ML
4 INJECTION INTRAMUSCULAR; INTRAVENOUS EVERY 30 MIN PRN
Status: CANCELLED | OUTPATIENT
Start: 2024-12-03

## 2024-12-03 RX ORDER — HYDROMORPHONE HYDROCHLORIDE 1 MG/ML
0.2 INJECTION, SOLUTION INTRAMUSCULAR; INTRAVENOUS; SUBCUTANEOUS EVERY 5 MIN PRN
Status: CANCELLED | OUTPATIENT
Start: 2024-12-03

## 2024-12-03 RX ORDER — OXYCODONE HYDROCHLORIDE 5 MG/1
5 TABLET ORAL
Status: CANCELLED | OUTPATIENT
Start: 2024-12-03

## 2024-12-03 RX ORDER — HYDROMORPHONE HYDROCHLORIDE 1 MG/ML
0.4 INJECTION, SOLUTION INTRAMUSCULAR; INTRAVENOUS; SUBCUTANEOUS EVERY 5 MIN PRN
Status: CANCELLED | OUTPATIENT
Start: 2024-12-03

## 2024-12-04 ENCOUNTER — HOSPITAL ENCOUNTER (OUTPATIENT)
Facility: AMBULATORY SURGERY CENTER | Age: 39
End: 2024-12-04
Attending: INTERNAL MEDICINE
Payer: COMMERCIAL

## 2024-12-04 RX ORDER — LIDOCAINE 40 MG/G
CREAM TOPICAL
Status: ACTIVE | OUTPATIENT
Start: 2024-12-04

## 2024-12-04 RX ORDER — ACETAMINOPHEN 325 MG/1
975 TABLET ORAL ONCE
Status: ACTIVE | OUTPATIENT
Start: 2024-12-04

## 2024-12-04 RX ORDER — ONDANSETRON 2 MG/ML
4 INJECTION INTRAMUSCULAR; INTRAVENOUS
Status: ACTIVE | OUTPATIENT
Start: 2024-12-04

## 2024-12-04 NOTE — OR NURSING
Patient scheduled for flexible sigmoidoscopy. Pt arrived and stated that he has not had a bowel movement since the fleets enema. Patient cancelled and will call to reschedule appointment.

## 2024-12-04 NOTE — H&P
Parish Carter  [id number]  male  39 year old      Reason for procedure/surgery: follow up rectal ulcer    Patient Active Problem List   Diagnosis    History of heroin abuse (H)    Hand numbness    Carpal tunnel syndrome on both sides    Tobacco abuse       Past Surgical History:    Past Surgical History:   Procedure Laterality Date    COLONOSCOPY N/A 8/27/2024    Procedure: COLONOSCOPY, WITH POLYPECTOMY AND BIOPSY;  Surgeon: Clare Dover DO;  Location: [facility] OR    COLONOSCOPY WITH CO2 INSUFFLATION N/A 8/27/2024    Procedure: Colonoscopy with CO2 insufflation;  Surgeon: Clare Dover DO;  Location: [facility] OR    COLONOSCOPY WITH CO2 INSUFFLATION N/A 8/26/2024    Procedure: Colonoscopy with CO2 insufflation;  Surgeon: Arron Ng MD;  Location: [facility] OR    RELEASE CARPAL TUNNEL Left 2/27/2020    Procedure: RELEASE, CARPAL TUNNEL, Left;  Surgeon: Parish Lin MD;  Location: [facility] OR       Past Medical History:   Past Medical History:   Diagnosis Date    Substance abuse (H)     IV Heroin for 2 1/2 years.     Tobacco abuse        Social History:   Social History     Tobacco Use    Smoking status: Light Smoker     Current packs/day: 0.25     Average packs/day: 0.3 packs/day for 15.0 years (3.8 ttl pk-yrs)     Types: Cigarettes    Smokeless tobacco: Never    Tobacco comments:     Trying to quit.   Substance Use Topics    Alcohol use: No       Family History: History reviewed. No pertinent family history.    Allergies: No Known Allergies    Active Medications:   Current Outpatient Medications   Medication Sig Dispense Refill    bisacodyl (DULCOLAX) 5 MG EC tablet 2 days prior to procedure, take 2 tablets at 4 pm. 1 day prior to procedure, take 2 tablets at 4 pm. For additional instructions refer to your colonoscopy prep instructions. 4 tablet 0    bisacodyl (DULCOLAX) 5 MG EC tablet Take as directed. One day before the exam at 4 PM, take 2 Dulcolax (Bisacodyl) tablets.  At 10 PM take 2 Dulcolax (Bisacodyl)  tablets. (Patient not taking: Reported on 8/22/2024) 4 tablet 0    bisacodyl (DULCOLAX) 5 MG EC tablet Two days prior to exam take two (2) tablets at 4pm. One day prior to exam take two (2) tablets at 4pm (Patient not taking: Reported on 8/22/2024) 4 tablet 0    ciprofloxacin (CIPRO) 500 MG tablet Take 1 tablet (500 mg) by mouth 2 times daily. 14 tablet 0    linaclotide (LINZESS) 145 MCG capsule Take 1 capsule (145 mcg) by mouth every morning (before breakfast) 30 capsule 0    lubiprostone (AMITIZA) 24 MCG capsule Take 1 capsule (24 mcg) by mouth 2 times daily (with meals). 60 capsule 1    METAMUCIL FIBER PO Take by mouth 2 times daily      polyethylene glycol (GOLYTELY) 236 g suspension 2 days prior at 5pm, mix and drink half of a jug of Golytely. Drink an 8 oz. glass of Golytely every 15 minutes until half of the jug is gone. Place remainder of Golytely in the refrigerator. 1 day prior at 5 pm, drink the 2nd half of a jug of Golytely bowel prep. 6 hours before your check-in time, drink an 8 oz. glass of Golytely every 15 minutes until half of the 2nd jug of Golytely is gone. Discard remainder of second jug. 8000 mL 0    polyethylene glycol (GOLYTELY) 236 g suspension 2 days before procedure at 5 PM fill first container with water. Mix and drink an 8 oz glass of prep every 15 minutes until HALF of the container is gone. Put remainder in the refrigerator. One day before procedure at 3 PM, drink the remaining prep one 8 oz glass every 15 minutes until the container is empty. At 8 PM fill the second container with water. Mix and drink an 8 oz glass of prep every 15 minutes until HALF of the container is gone. Put remainder in the refrigerator. Day of procedure 6 hours before arrival time, drink the remaining prep one 8 oz glass every 15 minutes until the container is empty. (Patient not taking: Reported on 8/22/2024) 8000 mL 0    polyethylene glycol (GOLYTELY) 236 g suspension Take as directed. Two days before your  exam fill the first container with water. Cover and shake until mixed well. At 5:00pm drink one 8oz glass every 10-15 minutes until half (1/2) of the first container is empty. Store the remainder in the refrigerator. One day before your exam at 5:00pm drink the second half of the first container until it is gone. Before you go to bed mix the second container with water and put in refrigerator. Six hours before your check in time drink one 8oz glass every 10-15 minutes until half of container is empty. Discard the remainder of solution. (Patient not taking: Reported on 8/22/2024) 8000 mL 0    polyethylene glycol (MIRALAX) 17 GM/Dose powder Day before exam at 5 PM start drinking an 8-ounce glass of the Miralax and Gatorade mixture every 15 minutes until the pitcher is HALF empty.  Store the rest in the refrigerator. At 10 PM start drinking an 8-ounce glass of Miralax and Gatorade mixture every 15 minutes until the pitcher is empty (about 4 glasses). (Patient not taking: Reported on 8/22/2024) 238 g 0       Systemic Review:   CONSTITUTIONAL: NEGATIVE for fever, chills, change in weight  ENT/MOUTH: NEGATIVE for ear, mouth and throat problems  RESP: NEGATIVE for significant cough or SOB  CV: NEGATIVE for chest pain, palpitations or peripheral edema    Physical Examination:   Vital Signs: There were no vitals taken for this visit.  GENERAL: healthy, alert and no distress  NECK: no adenopathy, no asymmetry, masses, or scars  RESP: lungs clear to auscultation - no rales, rhonchi or wheezes  CV: regular rate and rhythm, normal S1 S2, no S3 or S4, no murmur, click or rub, no peripheral edema and peripheral pulses strong  ABDOMEN: soft, nontender, no hepatosplenomegaly, no masses and bowel sounds normal  MS: no gross musculoskeletal defects noted, no edema    Plan: Appropriate to proceed as scheduled.      Anna Wilcox MD  12/4/2024    PCP:  No Ref-Primary, Physician

## 2024-12-23 ENCOUNTER — TELEPHONE (OUTPATIENT)
Dept: GASTROENTEROLOGY | Facility: CLINIC | Age: 39
End: 2024-12-23
Payer: COMMERCIAL

## 2024-12-23 NOTE — TELEPHONE ENCOUNTER
"Endoscopy Scheduling Screen    Have you had any respiratory illness or flu-like symptoms in the last 10 days?  No    What is your communication preference for Instructions and/or Bowel Prep?   MyChart    What insurance is in the chart?  Other:  [employer]    Ordering/Referring Provider: Thee Kunz PA-C in  GI   (If ordering provider performs procedure, schedule with ordering provider unless otherwise instructed. )    BMI: Estimated body mass index is 27.67 kg/m  as calculated from the following:    Height as of 8/22/24: 1.791 m (5' 10.5\").    Weight as of 8/22/24: 88.7 kg (195 lb 9.6 oz).     Sedation Ordered  MAC/deep sedation.   BMI<= 45 45 < BMI <= 48 48 < BMI < = 50  BMI > 50   No Restrictions No [facility]  No [facility]  [facility] with exceptions Hospital Only OR Only       Do you have a history of malignant hyperthermia?  No    (Females) Are you currently pregnant?   NA     Have you been diagnosed or told you have pulmonary hypertension?   No    Do you have an LVAD?  No    Have you been told you have moderate to severe sleep apnea?  No.    Have you been told you have COPD, asthma, or any other lung disease?  No    Do you have any heart conditions?  No     Have you ever had or are you waiting for an organ transplant?  No. Continue scheduling, no site restrictions.    Have you had a stroke or transient ischemic attack (TIA aka \"mini stroke\" in the last 6 months?   No    Have you been diagnosed with or been told you have cirrhosis of the liver?   No.    Are you currently on dialysis?   No    Do you need assistance transferring?   No    BMI: Estimated body mass index is 27.67 kg/m  as calculated from the following:    Height as of 8/22/24: 1.791 m (5' 10.5\").    Weight as of 8/22/24: 88.7 kg (195 lb 9.6 oz).     Is patients BMI > 40 and scheduling location [facility]?  No    Do you take an injectable or oral medication for weight loss or diabetes (excluding insulin)?  No    Do you take the medication " Naltrexone?  No    Do you take blood thinners?  No       Prep   Are you currently on dialysis or do you have chronic kidney disease?  No    Do you have a diagnosis of diabetes?  No    Do you have a diagnosis of cystic fibrosis (CF)?  No    On a regular basis do you go 3 -5 days between bowel movements?  Yes (Extended Prep)    BMI > 40?  No    Preferred Pharmacy:    [id number] - [place], [place] - [address]  [address]  [place] [address]  Phone: [phone] Fax: [phone]    Final Scheduling Details     Procedure scheduled  Flexible sigmoidoscopy    Surgeon:  Zak     Date of procedure:  2.12.25     Pre-OP / PAC:   No - Not required for this site.    Location  [facility] - Patient preference.    Sedation   MAC/Deep Sedation - Per order.      Patient Reminders:   You will receive a call from a Nurse to review instructions and health history.  This assessment must be completed prior to your procedure.  Failure to complete the Nurse assessment may result in the procedure being cancelled.      On the day of your procedure, please designate an adult(s) who can drive you home stay with you for the next 24 hours. The medicines used in the exam will make you sleepy. You will not be able to drive.      You cannot take public transportation, ride share services, or non-medical taxi service without a responsible caregiver.  Medical transport services are allowed with the requirement that a responsible caregiver will receive you at your destination.  We require that drivers and caregivers are confirmed prior to your procedure.

## 2025-01-30 ENCOUNTER — TELEPHONE (OUTPATIENT)
Dept: GASTROENTEROLOGY | Facility: CLINIC | Age: 40
End: 2025-01-30
Payer: COMMERCIAL

## 2025-01-30 DIAGNOSIS — K62.6 RECTAL ULCER: Primary | ICD-10-CM

## 2025-01-30 RX ORDER — BISACODYL 5 MG/1
TABLET, DELAYED RELEASE ORAL
Qty: 4 TABLET | Refills: 0 | Status: SHIPPED | OUTPATIENT
Start: 2025-01-30

## 2025-01-30 NOTE — TELEPHONE ENCOUNTER
Pre assessment completed for upcoming procedure.   (Please see previous telephone encounter notes for complete details)        Procedure details:    Arrival time and facility location reviewed.    Pre op exam needed? No.    Designated  policy reviewed. Instructed to have someone stay 24  hours post procedure.       Medication review:    Medications reviewed. Please see supporting documentation below. Holding recommendations discussed (if applicable).       Prep for procedure:     Procedure prep instructions reviewed.        Any additional information needed:  N/A      Patient verbalized understanding and had no questions or concerns at this time.      Rochelle Go LPN  Endoscopy Procedure Pre Assessment   [phone] option 2

## 2025-01-30 NOTE — TELEPHONE ENCOUNTER
Rescheduled Flexible sigmoidoscopy  Due to patient requested another day/time.    Pre visit planning completed.      Procedure details:    Patient scheduled for Flexible sigmoidoscopy on 2/12/25.     Arrival time: 1100. Procedure time 1200    Facility location: [facility]; [address] [place] [address] , [place] [id number]. Check in location: Main entrance at Surgery registation desk.    Sedation type: MAC    Pre op exam needed? No.    Indication for procedure: Rectal ulcer      Chart review:     Electronic implanted devices? No    Recent diagnosis of diverticulitis within the last 6 weeks? No      Medication review:    Diabetic? No    Anticoagulants? No    Weight loss medication/injectable? No GLP-1 medication per patient's medication list. Nursing to verify with pre-assessment call.    Other medication HOLDING recommendations:  N/A      Prep for procedure:     Bowel prep recommendation: Extended Golytely. Bowel prep sent to    [id number] - [place], [place] - [address] AT [facility] [address]    Due to: poor prep/inadequate bowel prep in the past    Prep instructions sent via christal Go LPN  Endoscopy Procedure Pre Assessment   [phone] option 2

## 2025-02-11 ENCOUNTER — ANESTHESIA EVENT (OUTPATIENT)
Dept: GASTROENTEROLOGY | Facility: CLINIC | Age: 40
End: 2025-02-11
Payer: COMMERCIAL

## 2025-02-11 ASSESSMENT — LIFESTYLE VARIABLES: TOBACCO_USE: 1

## 2025-02-11 NOTE — H&P
[facility] Anesthesia Pre-op History and Physical    Parish Carter MRN# [id number]   Age: 39 year old YOB: 1985      Date of Surgery: 2/12/2025 Location [facility]      Date of Exam 2/12/2025 Facility (In hospital)       Home clinic: [facility]  Primary care provider: No Ref-Primary, Physician         Chief Complaint and/or Reason for Procedure:   No chief complaint on file.  Flex sig. Rectal ulcer. Constipation  Aug.2024 favor stercoral ulcer, neg bx. 50 mm.       Active problem list:     Patient Active Problem List    Diagnosis Date Noted    Tobacco abuse      Priority: Medium    Hand numbness 02/10/2020     Priority: Medium     Added automatically from request for surgery [id number]      Carpal tunnel syndrome on both sides 02/10/2020     Priority: Medium     Added automatically from request for surgery [id number]      History of heroin abuse (H) 01/09/2017     Priority: Medium     Sober since 2016.              Medications (include herbals and vitamins):   Any Plavix use in the last 7 days? No     No current facility-administered medications for this encounter.     Current Outpatient Medications   Medication Sig Dispense Refill    bisacodyl (DULCOLAX) 5 MG EC tablet Two days prior to exam take two (2) tablets at 4pm. One day prior to exam take two (2) tablets at 4pm 4 tablet 0    bisacodyl (DULCOLAX) 5 MG EC tablet 2 days prior to procedure, take 2 tablets at 4 pm. 1 day prior to procedure, take 2 tablets at 4 pm. For additional instructions refer to your colonoscopy prep instructions. 4 tablet 0    bisacodyl (DULCOLAX) 5 MG EC tablet Take as directed. One day before the exam at 4 PM, take 2 Dulcolax (Bisacodyl) tablets.  At 10 PM take 2 Dulcolax (Bisacodyl) tablets. (Patient not taking: Reported on 8/22/2024) 4 tablet 0    bisacodyl (DULCOLAX) 5 MG EC tablet Two days prior to exam take two (2) tablets at 4pm. One day prior to exam take two (2)  tablets at 4pm (Patient not taking: Reported on 8/22/2024) 4 tablet 0    ciprofloxacin (CIPRO) 500 MG tablet Take 1 tablet (500 mg) by mouth 2 times daily. 14 tablet 0    linaclotide (LINZESS) 145 MCG capsule Take 1 capsule (145 mcg) by mouth every morning (before breakfast) 30 capsule 0    lubiprostone (AMITIZA) 24 MCG capsule Take 1 capsule (24 mcg) by mouth 2 times daily (with meals). 60 capsule 1    METAMUCIL FIBER PO Take by mouth 2 times daily      polyethylene glycol (GOLYTELY) 236 g suspension Two days before procedure at 5PM fill first container with water. Mix and drink an 8 oz glass every 15 minutes until HALF of the container is gone. Place the remainder in the refrigerator. One day before procedure at 5PM drink second half of bowel prep. Drink an 8 oz glass every 15 minutes until it is gone. Day of procedure 6 hours before arrival time fill the 2nd container with water. Mix and drink an 8 oz glass every 15 minutes until HALF of the container is gone. Discard the remaining solution. 8000 mL 0    polyethylene glycol (GOLYTELY) 236 g suspension 2 days prior at 5pm, mix and drink half of a jug of Golytely. Drink an 8 oz. glass of Golytely every 15 minutes until half of the jug is gone. Place remainder of Golytely in the refrigerator. 1 day prior at 5 pm, drink the 2nd half of a jug of Golytely bowel prep. 6 hours before your check-in time, drink an 8 oz. glass of Golytely every 15 minutes until half of the 2nd jug of Golytely is gone. Discard remainder of second jug. 8000 mL 0    polyethylene glycol (GOLYTELY) 236 g suspension 2 days before procedure at 5 PM fill first container with water. Mix and drink an 8 oz glass of prep every 15 minutes until HALF of the container is gone. Put remainder in the refrigerator. One day before procedure at 3 PM, drink the remaining prep one 8 oz glass every 15 minutes until the container is empty. At 8 PM fill the second container with water. Mix and drink an 8 oz glass of  prep every 15 minutes until HALF of the container is gone. Put remainder in the refrigerator. Day of procedure 6 hours before arrival time, drink the remaining prep one 8 oz glass every 15 minutes until the container is empty. (Patient not taking: Reported on 8/22/2024) 8000 mL 0    polyethylene glycol (GOLYTELY) 236 g suspension Take as directed. Two days before your exam fill the first container with water. Cover and shake until mixed well. At 5:00pm drink one 8oz glass every 10-15 minutes until half (1/2) of the first container is empty. Store the remainder in the refrigerator. One day before your exam at 5:00pm drink the second half of the first container until it is gone. Before you go to bed mix the second container with water and put in refrigerator. Six hours before your check in time drink one 8oz glass every 10-15 minutes until half of container is empty. Discard the remainder of solution. (Patient not taking: Reported on 8/22/2024) 8000 mL 0    polyethylene glycol (MIRALAX) 17 GM/Dose powder Day before exam at 5 PM start drinking an 8-ounce glass of the Miralax and Gatorade mixture every 15 minutes until the pitcher is HALF empty.  Store the rest in the refrigerator. At 10 PM start drinking an 8-ounce glass of Miralax and Gatorade mixture every 15 minutes until the pitcher is empty (about 4 glasses). (Patient not taking: Reported on 8/22/2024) 238 g 0     Facility-Administered Medications Ordered in Other Encounters   Medication Dose Route Frequency Provider Last Rate Last Admin    acetaminophen (TYLENOL) tablet 975 mg  975 mg Oral Once Omid Akers MD        lidocaine (LMX4) kit   Topical Q1H PRN Anna Wilcox MD        lidocaine (LMX4) kit   Topical Q1H PRN Omid Akers MD        lidocaine 1 % 0.1-1 mL  0.1-1 mL Other Q1H PRN Anna Wilcox MD        lidocaine 1 % 0.1-1 mL  0.1-1 mL Other Q1H PRN Omid Akers MD        ondansetron (ZOFRAN)  injection 4 mg  4 mg Intravenous Once PRN Anna Wilcox MD        sodium chloride (PF) 0.9% PF flush 3 mL  3 mL Intracatheter Q8H Anna Wilcox MD        sodium chloride (PF) 0.9% PF flush 3 mL  3 mL Intracatheter q1 min prn Anna Wilcox MD        sodium chloride (PF) 0.9% PF flush 3 mL  3 mL Intracatheter Q8H Omid Akers MD        sodium chloride (PF) 0.9% PF flush 3 mL  3 mL Intracatheter q1 min prn Omid Akers MD                 Allergies:    No Known Allergies  Allergy to Latex? No  Allergy to tape?   No  Intolerances:             Physical Exam:   All vitals have been reviewed  No data found.  No intake/output data recorded.  Lungs:   No increased work of breathing, good air exchange, clear to auscultation bilaterally, no crackles or wheezing     Cardiovascular:   Normal apical impulse, regular rate and rhythm, normal S1 and S2, no S3 or S4, and no murmur noted             Lab / Radiology Results:            Anesthetic risk and/or ASA classification:       Bulmaro Crespo MD

## 2025-02-12 ENCOUNTER — HOSPITAL ENCOUNTER (OUTPATIENT)
Facility: CLINIC | Age: 40
Discharge: HOME | End: 2025-02-12
Attending: INTERNAL MEDICINE | Admitting: INTERNAL MEDICINE
Payer: COMMERCIAL

## 2025-02-12 ENCOUNTER — ANESTHESIA (OUTPATIENT)
Dept: GASTROENTEROLOGY | Facility: CLINIC | Age: 40
End: 2025-02-12
Payer: COMMERCIAL

## 2025-02-12 VITALS
DIASTOLIC BLOOD PRESSURE: 52 MMHG | HEART RATE: 78 BPM | RESPIRATION RATE: 16 BRPM | TEMPERATURE: 97.9 F | OXYGEN SATURATION: 97 % | SYSTOLIC BLOOD PRESSURE: 95 MMHG

## 2025-02-12 LAB — FLEXIBLE SIGMOIDOSCOPY: NORMAL

## 2025-02-12 PROCEDURE — 370N000017 HC ANESTHESIA TECHNICAL FEE, PER MIN: Performed by: INTERNAL MEDICINE

## 2025-02-12 PROCEDURE — 250N000011 HC RX IP 250 OP 636: Performed by: NURSE ANESTHETIST, CERTIFIED REGISTERED

## 2025-02-12 PROCEDURE — 250N000013 HC RX MED GY IP 250 OP 250 PS 637: Performed by: INTERNAL MEDICINE

## 2025-02-12 PROCEDURE — 250N000009 HC RX 250: Performed by: NURSE ANESTHETIST, CERTIFIED REGISTERED

## 2025-02-12 PROCEDURE — 258N000003 HC RX IP 258 OP 636: Performed by: NURSE ANESTHETIST, CERTIFIED REGISTERED

## 2025-02-12 PROCEDURE — 45330 DIAGNOSTIC SIGMOIDOSCOPY: CPT | Performed by: INTERNAL MEDICINE

## 2025-02-12 RX ORDER — DEXAMETHASONE SODIUM PHOSPHATE 10 MG/ML
4 INJECTION, SOLUTION INTRAMUSCULAR; INTRAVENOUS
Status: DISCONTINUED | OUTPATIENT
Start: 2025-02-12 | End: 2025-02-12 | Stop reason: HOSPADM

## 2025-02-12 RX ORDER — ONDANSETRON 4 MG/1
4 TABLET, ORALLY DISINTEGRATING ORAL EVERY 30 MIN PRN
Status: DISCONTINUED | OUTPATIENT
Start: 2025-02-12 | End: 2025-02-12 | Stop reason: HOSPADM

## 2025-02-12 RX ORDER — SODIUM PHOSPHATE,MONO-DIBASIC 19G-7G/118
1 ENEMA (ML) RECTAL ONCE
Status: COMPLETED | OUTPATIENT
Start: 2025-02-12 | End: 2025-02-12

## 2025-02-12 RX ORDER — PROPOFOL 10 MG/ML
INJECTION, EMULSION INTRAVENOUS CONTINUOUS PRN
Status: DISCONTINUED | OUTPATIENT
Start: 2025-02-12 | End: 2025-02-12

## 2025-02-12 RX ORDER — NALOXONE HYDROCHLORIDE 0.4 MG/ML
0.1 INJECTION, SOLUTION INTRAMUSCULAR; INTRAVENOUS; SUBCUTANEOUS
Status: DISCONTINUED | OUTPATIENT
Start: 2025-02-12 | End: 2025-02-12 | Stop reason: HOSPADM

## 2025-02-12 RX ORDER — SODIUM CHLORIDE, SODIUM LACTATE, POTASSIUM CHLORIDE, CALCIUM CHLORIDE 600; 310; 30; 20 MG/100ML; MG/100ML; MG/100ML; MG/100ML
INJECTION, SOLUTION INTRAVENOUS CONTINUOUS PRN
Status: DISCONTINUED | OUTPATIENT
Start: 2025-02-12 | End: 2025-02-12

## 2025-02-12 RX ORDER — ONDANSETRON 2 MG/ML
4 INJECTION INTRAMUSCULAR; INTRAVENOUS EVERY 30 MIN PRN
Status: DISCONTINUED | OUTPATIENT
Start: 2025-02-12 | End: 2025-02-12 | Stop reason: HOSPADM

## 2025-02-12 RX ORDER — PROPOFOL 10 MG/ML
INJECTION, EMULSION INTRAVENOUS PRN
Status: DISCONTINUED | OUTPATIENT
Start: 2025-02-12 | End: 2025-02-12

## 2025-02-12 RX ORDER — LIDOCAINE HYDROCHLORIDE 20 MG/ML
INJECTION, SOLUTION INFILTRATION; PERINEURAL PRN
Status: DISCONTINUED | OUTPATIENT
Start: 2025-02-12 | End: 2025-02-12

## 2025-02-12 RX ADMIN — SODIUM CHLORIDE, POTASSIUM CHLORIDE, SODIUM LACTATE AND CALCIUM CHLORIDE: 600; 310; 30; 20 INJECTION, SOLUTION INTRAVENOUS at 12:34

## 2025-02-12 RX ADMIN — LIDOCAINE HYDROCHLORIDE 50 MG: 20 INJECTION, SOLUTION INFILTRATION; PERINEURAL at 12:48

## 2025-02-12 RX ADMIN — PROPOFOL 200 MCG/KG/MIN: 10 INJECTION, EMULSION INTRAVENOUS at 12:49

## 2025-02-12 RX ADMIN — SODIUM PHOSPHATE 1 ENEMA: 7; 19 ENEMA RECTAL at 12:14

## 2025-02-12 RX ADMIN — PROPOFOL 90 MG: 10 INJECTION, EMULSION INTRAVENOUS at 12:49

## 2025-02-12 ASSESSMENT — ACTIVITIES OF DAILY LIVING (ADL)
ADLS_ACUITY_SCORE: 41

## 2025-02-12 NOTE — DISCHARGE INSTRUCTIONS
[facility]    Home Care Following Endoscopy          Activity:  You have just undergone an endoscopic procedure under sedation.  Do not work or operate machinery (including a car) for at least 12 hours.      Diet:  Return to the diet you were on before your procedure but eat lightly for the first 12-24 hours.  Drink plenty of water.  Resume any regular medications unless otherwise advised by your physician.  Please begin any new medication prescribed as a result of your procedure as directed by your physician.   If you had any biopsy or polyp removed please refrain from aspirin or aspirin products for 2 days.  If on Coumadin please restart as instructed by your physician.   Pain:  You may take Tylenol as needed for pain.  Expected Recovery:  You can expect some mild abdominal fullness and/or discomfort due to the air used to inflate your intestinal tract. I encourage you to walk and attempt to pass air as soon as possible.        Call Your Physician if You Have:  After Colonoscopy/sigmoidoscopy:  Worsening persisting abdominal pain which is worse with activity.  Fevers (>101 degrees F), chills or shakes.  Passage of continued blood with bowel movements.   Any questions or concerns about your recovery, please call [phone] or after hours [phone] Nurse Advice Line.    Follow-up Care:  If you had polyps/biopsy tissue sample(s) removed, the polyps/biopsy tissue sample(s) will be sent to pathology.  You should receive a letter in your My Chart with your results, within 1-2 weeks. If you do not participate in My Chart a physical letter will come in the mail in 2-3 weeks.  Please call if you have not received a notification of your results.

## 2025-02-12 NOTE — ANESTHESIA POSTPROCEDURE EVALUATION
Patient: Parish Carter    Procedure: Procedure(s):  Sigmoidoscopy flexible       Anesthesia Type:  MAC    Note:  Disposition: Outpatient   Postop Pain Control: Uneventful            Sign Out: Well controlled pain   PONV: No   Neuro/Psych: Uneventful            Sign Out: Acceptable/Baseline neuro status   Airway/Respiratory: Uneventful            Sign Out: Acceptable/Baseline resp. status   CV/Hemodynamics: Uneventful            Sign Out: Acceptable CV status   Other NRE: NONE   DID A NON-ROUTINE EVENT OCCUR? No    Event details/Postop Comments:  Pt was happy with anesthesia care.  No complications.  I will follow up with the pt if needed.           Last vitals:  Vitals Value Taken Time   BP 96/50 02/12/25 1301   Temp     Pulse 79 02/12/25 1301   Resp     SpO2 96 % 02/12/25 1306   Vitals shown include unfiled device data.    Electronically Signed By: DAMIAN Golden CRNA  February 12, 2025  1:08 PM

## 2025-02-12 NOTE — ANESTHESIA PREPROCEDURE EVALUATION
Anesthesia Pre-Procedure Evaluation    Patient: Parish Carter   MRN: [id number] : 1985        Procedure : Procedure(s):  Sigmoidoscopy flexible          Past Medical History:   Diagnosis Date    Substance abuse (H)     IV Heroin for 2 1/2 years.     Tobacco abuse       Past Surgical History:   Procedure Laterality Date    COLONOSCOPY N/A 2024    Procedure: COLONOSCOPY, WITH POLYPECTOMY AND BIOPSY;  Surgeon: Clare Dover DO;  Location: [facility]    COLONOSCOPY WITH CO2 INSUFFLATION N/A 2024    Procedure: Colonoscopy with CO2 insufflation;  Surgeon: Clare Dover DO;  Location: [facility]    COLONOSCOPY WITH CO2 INSUFFLATION N/A 2024    Procedure: Colonoscopy with CO2 insufflation;  Surgeon: Arron Ng MD;  Location: [facility]    RELEASE CARPAL TUNNEL Left 2020    Procedure: RELEASE, CARPAL TUNNEL, Left;  Surgeon: Parish Lin MD;  Location: [facility] OR      No Known Allergies   Social History     Tobacco Use    Smoking status: Light Smoker     Current packs/day: 0.25     Average packs/day: 0.3 packs/day for 15.0 years (3.8 ttl pk-yrs)     Types: Cigarettes    Smokeless tobacco: Never    Tobacco comments:     Trying to quit.   Substance Use Topics    Alcohol use: No      Wt Readings from Last 1 Encounters:   24 88.7 kg (195 lb 9.6 oz)        Anesthesia Evaluation   Pt has had prior anesthetic. Type: MAC.    No history of anesthetic complications       ROS/MED HX  ENT/Pulmonary:     (+)                tobacco use, Current use,  4  Pack-Year Hx,                      Neurologic:  - neg neurologic ROS     Cardiovascular:  - neg cardiovascular ROS   (+)  - -   -  - -                                 Previous cardiac testing   Echo: Date: Results:    Stress Test:  Date: Results:    ECG Reviewed:  Date: 28 Results:  Sinus  Rhythm   WITHIN NORMAL LIMITS  Cath:  Date: Results:      METS/Exercise Tolerance:     Hematologic:     (+)      anemia,          Musculoskeletal:  - neg  "musculoskeletal ROS     GI/Hepatic: Comment: Abdominal pain and constipation    (+)        bowel prep,         (-) GERD   Renal/Genitourinary:     (+) renal disease, type: CRI, Pt does not require dialysis,           Endo:  - neg endo ROS     Psychiatric/Substance Use:     (+)    H/O chronic opiod use  (Hx of heroin use). Recreational drug usage: Other (Comment) (heroin).    Infectious Disease:  - neg infectious disease ROS     Malignancy:  - neg malignancy ROS     Other:  - neg other ROS          Physical Exam    Airway        Mallampati: II   TM distance: > 3 FB   Neck ROM: full   Mouth opening: > 3 cm    Respiratory Devices and Support         Dental       (+) Modest Abnormalities - crowns, retainers, 1 or 2 missing teeth      Cardiovascular   cardiovascular exam normal       Rhythm and rate: regular and normal     Pulmonary   pulmonary exam normal        breath sounds clear to auscultation       Other findings: Red bumps on bilateral arms  OUTSIDE LABS:  CBC:   Lab Results   Component Value Date    WBC 9.4 08/22/2024    WBC 10.7 05/01/2018    HGB 11.2 (L) 08/22/2024    HGB 14.6 05/01/2018    HCT 36.0 (L) 08/22/2024    HCT 44.0 05/01/2018     08/22/2024     05/01/2018     BMP:   Lab Results   Component Value Date     08/22/2024     05/01/2018    POTASSIUM 4.4 08/22/2024    POTASSIUM 4.2 05/01/2018    CHLORIDE 102 08/22/2024    CHLORIDE 109 05/01/2018    CO2 27 08/22/2024    CO2 25 05/01/2018    BUN 18.8 08/22/2024    BUN 26 05/01/2018    CR 1.26 (H) 08/22/2024    CR 0.93 05/01/2018     (H) 08/22/2024    GLC 82 05/01/2018     COAGS: No results found for: \"PTT\", \"INR\", \"FIBR\"  POC: No results found for: \"BGM\", \"HCG\", \"HCGS\"  HEPATIC:   Lab Results   Component Value Date    ALBUMIN 3.8 08/22/2024    PROTTOTAL 7.7 08/22/2024    ALT 10 08/22/2024    AST 22 08/22/2024    ALKPHOS 123 08/22/2024    BILITOTAL 0.5 08/22/2024     OTHER:   Lab Results   Component Value Date    A1C 5.7 (H) " 08/22/2024    LIZETT 9.0 08/22/2024    LIPASE 115 01/10/2016    TSH 0.52 05/01/2018       Anesthesia Plan    ASA Status:  2    NPO Status:  NPO Appropriate    Anesthesia Type: MAC.     - Reason for MAC: immobility needed, straight local not clinically adequate   Induction: Propofol, Intravenous.   Maintenance: TIVA.        Consents    Anesthesia Plan(s) and associated risks, benefits, and realistic alternatives discussed. Questions answered and patient/representative(s) expressed understanding.     - Discussed:     - Discussed with:  Patient      - Extended Intubation/Ventilatory Support Discussed: No.      - Patient is DNR/DNI Status: No     Use of blood products discussed: No .     Postoperative Care       PONV prophylaxis: Background Propofol Infusion     Comments:    Other Comments: The risks and benefits of anesthesia, and the alternatives where applicable, have been discussed with the patient, and they wish to proceed.              DAMIAN Flores CRNA    I have reviewed the pertinent notes and labs in the chart from the past 30 days and (re)examined the patient.  Any updates or changes from those notes are reflected in this note.    Clinically Significant Risk Factors Present on Admission

## 2025-02-12 NOTE — ANESTHESIA CARE TRANSFER NOTE
Patient: Parish Carter    Procedure: Procedure(s):  Sigmoidoscopy flexible       Diagnosis: Rectal ulcer [K62.6]  Diagnosis Additional Information: No value filed.    Anesthesia Type:   MAC     Note:    Oropharynx: oropharynx clear of all foreign objects and spontaneously breathing  Level of Consciousness: drowsy  Oxygen Supplementation: room air    Independent Airway: airway patency satisfactory and stable  Dentition: dentition unchanged  Vital Signs Stable: post-procedure vital signs reviewed and stable  Report to RN Given: handoff report given  Patient transferred to: Phase II    Handoff Report: Identifed the Patient, Identified the Reponsible Provider, Reviewed the pertinent medical history, Discussed the surgical course, Reviewed Intra-OP anesthesia mangement and issues during anesthesia, Set expectations for post-procedure period and Allowed opportunity for questions and acknowledgement of understanding      Vitals:  Vitals Value Taken Time   BP 96/50 02/12/25 1301   Temp     Pulse 79 02/12/25 1301   Resp     SpO2 96 % 02/12/25 1306   Vitals shown include unfiled device data.    Electronically Signed By: DAMIAN Golden CRNA  February 12, 2025  1:07 PM

## 2025-03-10 NOTE — TELEPHONE ENCOUNTER
Diagnosis, Referred by & from: Rectal Ulcer   Appt date: 3/18/2025   NOTES STATUS DETAILS   OFFICE NOTE from referring provider N/A    OFFICE NOTE from other specialist Internal [facility]:  8/22/24, 2/22/24 - PCC OV with Dr. Rees    [facility]:  3/26/24 - GI OV with THADDEUS Díaz   DISCHARGE SUMMARY from hospital N/A    DISCHARGE REPORT from the ER Internal [facility]:  1/10/16 - ED OV with Dr. Hung   OPERATIVE REPORT N/A    MEDICATION LIST Internal    LABS     BIOPSIES/PATHOLOGY RELATED TO DIAGNOSIS Internal [facility]:  8/27/24 - Rectal Biopsy (Case: [id number])   DIAGNOSTIC PROCEDURES     COLONOSCOPY (most recent all time after 5 years) [facility]:  8/27/24 - Colonoscopy  8/26/24 - Colonoscopy - No Specimens   FLEX SIGMOIDOSCOPY [facility]:  2/12/25 - Flexible Sigmoidoscopy - No Specimens   IMAGING (DISC & REPORT)      CT [facility]:  1/10/16 - CT Abd/Pelvis   ULTRASOUND  (ENDOANAL/ENDORECTAL) [facility]:  8/29/24 - US Renal

## 2025-03-10 NOTE — PROGRESS NOTES
"Colon and Rectal Surgery Clinic Note    RE: Parish Carter.  : 1985.  ARNAV: 3/18/2025.    Reason for visit: rectal ulcer.    HPI: Parish Carter is a 39 year old male who presents today for a rectal ulcer. He has a past medical history of substance abuse (heroin) and constipation since childhood currently on Amitiza, Linzess, Doculax, Miralax, and Metamucil. He underwent a colonoscopy on 2024 for hematochezia and constipation and was found to have a single solitary 50mm cratered ulcer in the rectum, 2/3 circumferential. Surgical Pathology demonstrated active proctitis with ulcer and granulation tissue. Flexible sigmoidoscopy on 2025 demonstrated \"nonbleeding ulcerated mucosa with no stigmata of recent bleeding were present in the rectum. Roughly half lumen 40 mm, superficial.\"    Today Parish feels OK. He reports he has not had a BM in 10 days. He follows with Dr. Dover in GI. He is a current smoker.     Colonoscopy (2024):  Findings:       The perianal and digital rectal examinations were normal.        A single (solitary) 50 mm cratered ulcer was found in the rectum that        was 2/3 circumferential with clean base. Oozing was present. Biopsies        were taken with a cold forceps for histology.        A large amount of semi-solid stool was found in the entire colon,        precluding visualization. Procedure aborted in the tranverse colon due        to inadequate visualization                                                                                    Moderate Sedation:        Moderate Sedation was not administered   Impression:               - A single cratered, partially circumferential was                             found in the rectum. Possibly stercoral ulcer,                             unlikely location for ischemic colitis, malignancy                             a possibilty as well. Biopsied.                             - Stool in the entire examined colon, procedure          "                    aborted in transverse colon due to inadequate prep       Surgical Pathology (8/27/2024):  Final Diagnosis   A. RECTAL ULCER:  - Active proctitis with ulcer and granulation tissue  - No epithelioid granulomas, dysplasia, or malignancy identified (supported by a keratin AE1/AE3 immunohistochemical stain)  - CMV and Treponema stains are negative     Flexible Sigmoidoscopy (2/12/2025):  Findings:       A large amount of semi-solid stool was found in the recto-sigmoid colon,        making visualization difficult.        Nonbleeding ulcerated mucosa with no stigmata of recent bleeding were        present in the rectum. Roughly half lumen 40 mm, superficial                                                                                    Impression:            - Preparation of the colon was poor.                          - Stool in the recto-sigmoid colon.                          - Mucosal ulceration.                          Prior bx Aug. 2024 non-concerning and not repeated.                          - No specimens collected.         Medical history:  Substance abuse (heroin)  Tobacco use     Surgical history:  Carpal Tunnel Release     Family history:  No family history on file.      Medications:  Current Outpatient Medications   Medication Sig Dispense Refill    bisacodyl (DULCOLAX) 5 MG EC tablet Two days prior to exam take two (2) tablets at 4pm. One day prior to exam take two (2) tablets at 4pm 4 tablet 0    bisacodyl (DULCOLAX) 5 MG EC tablet 2 days prior to procedure, take 2 tablets at 4 pm. 1 day prior to procedure, take 2 tablets at 4 pm. For additional instructions refer to your colonoscopy prep instructions. 4 tablet 0    linaclotide (LINZESS) 145 MCG capsule Take 1 capsule (145 mcg) by mouth every morning (before breakfast) 30 capsule 0    lubiprostone (AMITIZA) 24 MCG capsule Take 1 capsule (24 mcg) by mouth 2 times daily (with meals). 60 capsule 1    METAMUCIL FIBER PO Take by mouth 2  times daily      polyethylene glycol (GOLYTELY) 236 g suspension Two days before procedure at 5PM fill first container with water. Mix and drink an 8 oz glass every 15 minutes until HALF of the container is gone. Place the remainder in the refrigerator. One day before procedure at 5PM drink second half of bowel prep. Drink an 8 oz glass every 15 minutes until it is gone. Day of procedure 6 hours before arrival time fill the 2nd container with water. Mix and drink an 8 oz glass every 15 minutes until HALF of the container is gone. Discard the remaining solution. 8000 mL 0    polyethylene glycol (GOLYTELY) 236 g suspension 2 days prior at 5pm, mix and drink half of a jug of Golytely. Drink an 8 oz. glass of Golytely every 15 minutes until half of the jug is gone. Place remainder of Golytely in the refrigerator. 1 day prior at 5 pm, drink the 2nd half of a jug of Golytely bowel prep. 6 hours before your check-in time, drink an 8 oz. glass of Golytely every 15 minutes until half of the 2nd jug of Golytely is gone. Discard remainder of second jug. 8000 mL 0    bisacodyl (DULCOLAX) 5 MG EC tablet Take as directed. One day before the exam at 4 PM, take 2 Dulcolax (Bisacodyl) tablets.  At 10 PM take 2 Dulcolax (Bisacodyl) tablets. (Patient not taking: Reported on 3/18/2025) 4 tablet 0    bisacodyl (DULCOLAX) 5 MG EC tablet Two days prior to exam take two (2) tablets at 4pm. One day prior to exam take two (2) tablets at 4pm (Patient not taking: Reported on 3/18/2025) 4 tablet 0    ciprofloxacin (CIPRO) 500 MG tablet Take 1 tablet (500 mg) by mouth 2 times daily. (Patient not taking: Reported on 3/18/2025) 14 tablet 0    polyethylene glycol (GOLYTELY) 236 g suspension 2 days before procedure at 5 PM fill first container with water. Mix and drink an 8 oz glass of prep every 15 minutes until HALF of the container is gone. Put remainder in the refrigerator. One day before procedure at 3 PM, drink the remaining prep one 8 oz  glass every 15 minutes until the container is empty. At 8 PM fill the second container with water. Mix and drink an 8 oz glass of prep every 15 minutes until HALF of the container is gone. Put remainder in the refrigerator. Day of procedure 6 hours before arrival time, drink the remaining prep one 8 oz glass every 15 minutes until the container is empty. (Patient not taking: Reported on 3/18/2025) 8000 mL 0    polyethylene glycol (GOLYTELY) 236 g suspension Take as directed. Two days before your exam fill the first container with water. Cover and shake until mixed well. At 5:00pm drink one 8oz glass every 10-15 minutes until half (1/2) of the first container is empty. Store the remainder in the refrigerator. One day before your exam at 5:00pm drink the second half of the first container until it is gone. Before you go to bed mix the second container with water and put in refrigerator. Six hours before your check in time drink one 8oz glass every 10-15 minutes until half of container is empty. Discard the remainder of solution. (Patient not taking: Reported on 3/18/2025) 8000 mL 0    polyethylene glycol (MIRALAX) 17 GM/Dose powder Day before exam at 5 PM start drinking an 8-ounce glass of the Miralax and Gatorade mixture every 15 minutes until the pitcher is HALF empty.  Store the rest in the refrigerator. At 10 PM start drinking an 8-ounce glass of Miralax and Gatorade mixture every 15 minutes until the pitcher is empty (about 4 glasses). (Patient not taking: Reported on 3/18/2025) 238 g 0       Allergies:  No Known Allergies    Social history:   Social History     Tobacco Use    Smoking status: Every Day     Current packs/day: 0.25     Average packs/day: 0.3 packs/day for 15.0 years (3.8 ttl pk-yrs)     Types: Cigarettes    Smokeless tobacco: Never    Tobacco comments:     Trying to quit.   Substance Use Topics    Alcohol use: No     Marital status: single.    ROS:  A complete review of systems was performed with  the patient and all systems negative except as per HPI.    Physical Examination:  Exam was chaperoned by Geoffrey Gonzalez, EMT-P  /81 (BP Location: Left arm, Patient Position: Sitting, Cuff Size: Adult Regular)   Pulse 99   SpO2 100%   General: Well hydrated. No acute distress.  CV: RRR  Lung: Non-labored breathing on RA  Abdomen: Soft, NT,       ASSESSMENT    This is a 39 year old M with constipation, h/o substance abuse, and a solitary rectal ulcer which has already been bx and did not show signs of malignancy. I believe this is related to his constipation. Further work up as detailed below. All questions were answered. Risks, benefits, and alternatives of operative treatment were thoroughly discussed with the patient, he understands these well and agrees to proceed.    All pertinent labs and imaging were personally reviewed by me.      PLAN  - Smoking cessation  - Follow up with GI dysmotility  - Defecography  - Pelvic floor PT  - RTC in 8 weeks to discuss next steps      45 minutes spent on the date of the encounter doing chart review, history and exam, imaging review, documentation and further activities as noted above.      Tee Mesa MD, FACS, FASCRS, FSSO    Division of Colon and Rectal Surgery  [facility]    Referring Provider:  Thee Kunz PA-C  [id number] [place] [address]     Primary Care Provider:  No Ref-Primary, Physician

## 2025-03-18 ENCOUNTER — OFFICE VISIT (OUTPATIENT)
Dept: SURGERY | Facility: CLINIC | Age: 40
End: 2025-03-18
Payer: COMMERCIAL

## 2025-03-18 ENCOUNTER — PRE VISIT (OUTPATIENT)
Dept: SURGERY | Facility: CLINIC | Age: 40
End: 2025-03-18

## 2025-03-18 VITALS — HEART RATE: 99 BPM | OXYGEN SATURATION: 100 % | SYSTOLIC BLOOD PRESSURE: 132 MMHG | DIASTOLIC BLOOD PRESSURE: 81 MMHG

## 2025-03-18 DIAGNOSIS — K62.6 RECTAL ULCER: Primary | ICD-10-CM

## 2025-03-18 DIAGNOSIS — K59.03 DRUG-INDUCED CONSTIPATION: ICD-10-CM

## 2025-03-18 PROCEDURE — 99204 OFFICE O/P NEW MOD 45 MIN: CPT | Performed by: SURGERY

## 2025-03-18 ASSESSMENT — PAIN SCALES - GENERAL: PAINLEVEL_OUTOF10: NO PAIN (0)

## 2025-03-18 NOTE — PATIENT INSTRUCTIONS
Please go out front to schedule defecography and follow up visit with Dr. Mesa   PT will call you to schedule these appointments     Ximena BYRNES [phone]

## 2025-03-18 NOTE — NURSING NOTE
Chief Complaint   Patient presents with    Consult       Vitals:    03/18/25 1445   BP: 132/81   BP Location: Left arm   Patient Position: Sitting   Cuff Size: Adult Regular   Pulse: 99   SpO2: 100%       There is no height or weight on file to calculate BMI.    Geoffrey Gonzalez EMT-P

## 2025-03-18 NOTE — LETTER
"3/18/2025       RE: Parish Carter  [address] [facility] [id number]     Dear Colleague,    Thank you for referring your patient, Parish Carter, to the [facility] COLON [facility] [place] at [facility]. Please see a copy of my visit note below.    Colon and Rectal Surgery Clinic Note    RE: Parish Carter.  : 1985.  ARNAV: 3/18/2025.    Reason for visit: rectal ulcer.    HPI: Parish Carter is a 39 year old male who presents today for a rectal ulcer. He has a past medical history of substance abuse (heroin) and constipation since childhood currently on Amitiza, Linzess, Doculax, Miralax, and Metamucil. He underwent a colonoscopy on 2024 for hematochezia and constipation and was found to have a single solitary 50mm cratered ulcer in the rectum, 2/3 circumferential. Surgical Pathology demonstrated active proctitis with ulcer and granulation tissue. Flexible sigmoidoscopy on 2025 demonstrated \"nonbleeding ulcerated mucosa with no stigmata of recent bleeding were present in the rectum. Roughly half lumen 40 mm, superficial.\"    Today Parish feels OK. He reports he has not had a BM in 10 days. He follows with Dr. Dover in GI. He is a current smoker.     Colonoscopy (2024):  Findings:       The perianal and digital rectal examinations were normal.        A single (solitary) 50 mm cratered ulcer was found in the rectum that        was 2/3 circumferential with clean base. Oozing was present. Biopsies        were taken with a cold forceps for histology.        A large amount of semi-solid stool was found in the entire colon,        precluding visualization. Procedure aborted in the tranverse colon due        to inadequate visualization                                                                                    Moderate Sedation:        Moderate Sedation was not administered   Impression:               - A single " cratered, partially circumferential was                             found in the rectum. Possibly stercoral ulcer,                             unlikely location for ischemic colitis, malignancy                             a possibilty as well. Biopsied.                             - Stool in the entire examined colon, procedure                             aborted in transverse colon due to inadequate prep       Surgical Pathology (8/27/2024):  Final Diagnosis   A. RECTAL ULCER:  - Active proctitis with ulcer and granulation tissue  - No epithelioid granulomas, dysplasia, or malignancy identified (supported by a keratin AE1/AE3 immunohistochemical stain)  - CMV and Treponema stains are negative     Flexible Sigmoidoscopy (2/12/2025):  Findings:       A large amount of semi-solid stool was found in the recto-sigmoid colon,        making visualization difficult.        Nonbleeding ulcerated mucosa with no stigmata of recent bleeding were        present in the rectum. Roughly half lumen 40 mm, superficial                                                                                    Impression:            - Preparation of the colon was poor.                          - Stool in the recto-sigmoid colon.                          - Mucosal ulceration.                          Prior bx Aug. 2024 non-concerning and not repeated.                          - No specimens collected.         Medical history:  Substance abuse (heroin)  Tobacco use     Surgical history:  Carpal Tunnel Release     Family history:  No family history on file.      Medications:  Current Outpatient Medications   Medication Sig Dispense Refill     bisacodyl (DULCOLAX) 5 MG EC tablet Two days prior to exam take two (2) tablets at 4pm. One day prior to exam take two (2) tablets at 4pm 4 tablet 0     bisacodyl (DULCOLAX) 5 MG EC tablet 2 days prior to procedure, take 2 tablets at 4 pm. 1 day prior to procedure, take 2 tablets at 4 pm. For additional  instructions refer to your colonoscopy prep instructions. 4 tablet 0     linaclotide (LINZESS) 145 MCG capsule Take 1 capsule (145 mcg) by mouth every morning (before breakfast) 30 capsule 0     lubiprostone (AMITIZA) 24 MCG capsule Take 1 capsule (24 mcg) by mouth 2 times daily (with meals). 60 capsule 1     METAMUCIL FIBER PO Take by mouth 2 times daily       polyethylene glycol (GOLYTELY) 236 g suspension Two days before procedure at 5PM fill first container with water. Mix and drink an 8 oz glass every 15 minutes until HALF of the container is gone. Place the remainder in the refrigerator. One day before procedure at 5PM drink second half of bowel prep. Drink an 8 oz glass every 15 minutes until it is gone. Day of procedure 6 hours before arrival time fill the 2nd container with water. Mix and drink an 8 oz glass every 15 minutes until HALF of the container is gone. Discard the remaining solution. 8000 mL 0     polyethylene glycol (GOLYTELY) 236 g suspension 2 days prior at 5pm, mix and drink half of a jug of Golytely. Drink an 8 oz. glass of Golytely every 15 minutes until half of the jug is gone. Place remainder of Golytely in the refrigerator. 1 day prior at 5 pm, drink the 2nd half of a jug of Golytely bowel prep. 6 hours before your check-in time, drink an 8 oz. glass of Golytely every 15 minutes until half of the 2nd jug of Golytely is gone. Discard remainder of second jug. 8000 mL 0     bisacodyl (DULCOLAX) 5 MG EC tablet Take as directed. One day before the exam at 4 PM, take 2 Dulcolax (Bisacodyl) tablets.  At 10 PM take 2 Dulcolax (Bisacodyl) tablets. (Patient not taking: Reported on 3/18/2025) 4 tablet 0     bisacodyl (DULCOLAX) 5 MG EC tablet Two days prior to exam take two (2) tablets at 4pm. One day prior to exam take two (2) tablets at 4pm (Patient not taking: Reported on 3/18/2025) 4 tablet 0     ciprofloxacin (CIPRO) 500 MG tablet Take 1 tablet (500 mg) by mouth 2 times daily. (Patient not  taking: Reported on 3/18/2025) 14 tablet 0     polyethylene glycol (GOLYTELY) 236 g suspension 2 days before procedure at 5 PM fill first container with water. Mix and drink an 8 oz glass of prep every 15 minutes until HALF of the container is gone. Put remainder in the refrigerator. One day before procedure at 3 PM, drink the remaining prep one 8 oz glass every 15 minutes until the container is empty. At 8 PM fill the second container with water. Mix and drink an 8 oz glass of prep every 15 minutes until HALF of the container is gone. Put remainder in the refrigerator. Day of procedure 6 hours before arrival time, drink the remaining prep one 8 oz glass every 15 minutes until the container is empty. (Patient not taking: Reported on 3/18/2025) 8000 mL 0     polyethylene glycol (GOLYTELY) 236 g suspension Take as directed. Two days before your exam fill the first container with water. Cover and shake until mixed well. At 5:00pm drink one 8oz glass every 10-15 minutes until half (1/2) of the first container is empty. Store the remainder in the refrigerator. One day before your exam at 5:00pm drink the second half of the first container until it is gone. Before you go to bed mix the second container with water and put in refrigerator. Six hours before your check in time drink one 8oz glass every 10-15 minutes until half of container is empty. Discard the remainder of solution. (Patient not taking: Reported on 3/18/2025) 8000 mL 0     polyethylene glycol (MIRALAX) 17 GM/Dose powder Day before exam at 5 PM start drinking an 8-ounce glass of the Miralax and Gatorade mixture every 15 minutes until the pitcher is HALF empty.  Store the rest in the refrigerator. At 10 PM start drinking an 8-ounce glass of Miralax and Gatorade mixture every 15 minutes until the pitcher is empty (about 4 glasses). (Patient not taking: Reported on 3/18/2025) 238 g 0       Allergies:  No Known Allergies    Social history:   Social History      Tobacco Use     Smoking status: Every Day     Current packs/day: 0.25     Average packs/day: 0.3 packs/day for 15.0 years (3.8 ttl pk-yrs)     Types: Cigarettes     Smokeless tobacco: Never     Tobacco comments:     Trying to quit.   Substance Use Topics     Alcohol use: No     Marital status: single.    ROS:  A complete review of systems was performed with the patient and all systems negative except as per HPI.    Physical Examination:  Exam was chaperoned by Geoffrey Gonzalez, EMT-P  /81 (BP Location: Left arm, Patient Position: Sitting, Cuff Size: Adult Regular)   Pulse 99   SpO2 100%   General: Well hydrated. No acute distress.  CV: RRR  Lung: Non-labored breathing on RA  Abdomen: Soft, NT,       ASSESSMENT    This is a 39 year old M with constipation, h/o substance abuse, and a solitary rectal ulcer which has already been bx and did not show signs of malignancy. I believe this is related to his constipation. Further work up as detailed below. All questions were answered. Risks, benefits, and alternatives of operative treatment were thoroughly discussed with the patient, he understands these well and agrees to proceed.    All pertinent labs and imaging were personally reviewed by me.      PLAN  - Smoking cessation  - Follow up with GI dysmotility  - Defecography  - Pelvic floor PT  - RTC in 8 weeks to discuss next steps      45 minutes spent on the date of the encounter doing chart review, history and exam, imaging review, documentation and further activities as noted above.      Tee Mesa MD, FACS, FASCRS, FSSO    Division of Colon and Rectal Surgery  [facility]    Referring Provider:  Thee Kunz PA-C  [address] [place] [address]     Primary Care Provider:  No Ref-Primary, Physician      Again, thank you for allowing me to participate in the care of your patient.      Sincerely,    Tee Mesa MD

## 2025-04-08 ENCOUNTER — OFFICE VISIT (OUTPATIENT)
Dept: GASTROENTEROLOGY | Facility: CLINIC | Age: 40
End: 2025-04-08
Attending: PHYSICIAN ASSISTANT
Payer: COMMERCIAL

## 2025-04-08 VITALS
DIASTOLIC BLOOD PRESSURE: 71 MMHG | OXYGEN SATURATION: 98 % | HEART RATE: 87 BPM | WEIGHT: 200.3 LBS | HEIGHT: 71 IN | SYSTOLIC BLOOD PRESSURE: 106 MMHG | BODY MASS INDEX: 28.04 KG/M2

## 2025-04-08 DIAGNOSIS — K59.09 CHRONIC CONSTIPATION: ICD-10-CM

## 2025-04-08 DIAGNOSIS — K58.1 IRRITABLE BOWEL SYNDROME WITH CONSTIPATION: ICD-10-CM

## 2025-04-08 DIAGNOSIS — K92.1 BLOOD IN STOOL: ICD-10-CM

## 2025-04-08 DIAGNOSIS — K62.6 RECTAL ULCER: Primary | ICD-10-CM

## 2025-04-08 PROCEDURE — 99214 OFFICE O/P EST MOD 30 MIN: CPT | Performed by: PHYSICIAN ASSISTANT

## 2025-04-08 RX ORDER — TENAPANOR HYDROCHLORIDE 53.2 MG/1
50 TABLET ORAL 2 TIMES DAILY
Qty: 180 TABLET | Refills: 0 | Status: SHIPPED | OUTPATIENT
Start: 2025-04-08

## 2025-04-08 ASSESSMENT — PAIN SCALES - GENERAL: PAINLEVEL_OUTOF10: MILD PAIN (2)

## 2025-04-08 NOTE — PROGRESS NOTES
FOLLOW UP GI CLINIC VISIT    CC/REFERRING MD:  Thee Kunz  REASON FOR CONSULTATION:   Thee Kunz for   Chief Complaint   Patient presents with    Follow Up     Follow up for chronic constipation and blood in stools.          ASSESSMENT/PLAN: Patient here for follow-up of chronic constipation and recently identified rectal ulcer which seems to be secondary to his chronic constipation.  His current regimen with Linzess and OTC laxatives has not been effective enough.  We will therefore switch to Ibsrela, he will use his OTC laxatives until then.  Keep appointment for x-ray defecography and pelvic floor PT, as he likely has some associated pelvic floor dysfunction that is contributing here.  Will follow-up with patient 1 to 2 months from now.    1. Rectal ulcer (Primary)    2. Chronic constipation    3. Blood in stool    4. Irritable bowel syndrome with constipation  - Tenapanor HCl (IBSRELA) 50 MG TABS; Take 50 mg by mouth 2 times daily.  Dispense: 180 tablet; Refill: 0        RTC 2 months    Thank you for this consultation.  It was a pleasure to participate in the care of this patient; please contact us with any further questions.      25 minutes spent on the date of the encounter doing chart review, patient visit, and documentation    This note was created with voice recognition software, and while reviewed for accuracy, typos may remain.     Thee Kunz PA-C  Division of Gastroenterology, Hepatology and Nutrition  [facility]  Parish Carter is a 39 year old male that is seen for follow up in the GI clinic today.  I initially met with the patient just over a year ago for concerns of chronic constipation and intermittent hematochezia.  He had described a lifelong history of constipation and this has been exacerbated by opioid use disorder earlier in his life.  Even after achieving sobriety from opioids, he continued to have challenges with constipation, often  going 3 to 5 days between BMs, but sometimes more than a week.  Stool would typically be hard and dry and difficult to pass.  Based on the symptoms, we started him on lubiprostone and referred for colonoscopy.  His initial colonoscopy was aborted due to inadequate prep.  He returned the following day with continued prep and was noted to have a single cratered, partially circumferential ulcer in the rectum, possibly stercoral ulcer, biopsied and no signs of malignancy or infection.  It was recommended that we continue working on optimizing stool output.  He ultimately failed lubiprostone and we then switched to Linzess.  He has continued using this but still has multiple days between BMs.  Has also been using MiraLAX and Dulcolax and only getting stool output every week.  He did see colorectal surgery recently and they are recommending pelvic floor workup, has x-ray Kristin Beckham scheduled for next week and has been referred to the pelvic floor PT clinic.  He starts this in a few months.    ROS:    10 point ROS neg other than the symptoms noted above in the HPI.    PREVIOUS ENDOSCOPY:  Reviewed, see HPI    PERTINENT RELEVANT IMAGING OR LABS:  Reviewed    ALLERGIES:   No Known Allergies    PERTINENT MEDICATIONS:    Current Outpatient Medications:     bisacodyl (DULCOLAX) 5 MG EC tablet, Two days prior to exam take two (2) tablets at 4pm. One day prior to exam take two (2) tablets at 4pm, Disp: 4 tablet, Rfl: 0    bisacodyl (DULCOLAX) 5 MG EC tablet, 2 days prior to procedure, take 2 tablets at 4 pm. 1 day prior to procedure, take 2 tablets at 4 pm. For additional instructions refer to your colonoscopy prep instructions., Disp: 4 tablet, Rfl: 0    linaclotide (LINZESS) 145 MCG capsule, Take 1 capsule (145 mcg) by mouth every morning (before breakfast), Disp: 30 capsule, Rfl: 0    polyethylene glycol (MIRALAX) 17 GM/Dose powder, Day before exam at 5 PM start drinking an 8-ounce glass of the Miralax and Gatorade  mixture every 15 minutes until the pitcher is HALF empty.  Store the rest in the refrigerator. At 10 PM start drinking an 8-ounce glass of Miralax and Gatorade mixture every 15 minutes until the pitcher is empty (about 4 glasses)., Disp: 238 g, Rfl: 0    Tenapanor HCl (IBSRELA) 50 MG TABS, Take 50 mg by mouth 2 times daily., Disp: 180 tablet, Rfl: 0    PROBLEM LIST  Patient Active Problem List    Diagnosis Date Noted    Tobacco abuse      Priority: Medium    Hand numbness 02/10/2020     Priority: Medium     Added automatically from request for surgery [id number]      Carpal tunnel syndrome on both sides 02/10/2020     Priority: Medium     Added automatically from request for surgery [id number]      History of heroin abuse (H) 01/09/2017     Priority: Medium     Sober since 2016.         PERTINENT PAST MEDICAL HISTORY:  Past Medical History:   Diagnosis Date    Substance abuse (H)     IV Heroin for 2 1/2 years.     Tobacco abuse        PREVIOUS SURGERIES:  Past Surgical History:   Procedure Laterality Date    COLONOSCOPY N/A 8/27/2024    Procedure: COLONOSCOPY, WITH POLYPECTOMY AND BIOPSY;  Surgeon: Clare Dover DO;  [facility]: [facility]    COLONOSCOPY WITH CO2 INSUFFLATION N/A 8/27/2024    Procedure: Colonoscopy with CO2 insufflation;  Surgeon: Clare Dover DO;  [facility]: [facility]    COLONOSCOPY WITH CO2 INSUFFLATION N/A 8/26/2024    Procedure: Colonoscopy with CO2 insufflation;  Surgeon: Arron Ng MD;  Location: [facility]    RELEASE CARPAL TUNNEL Left 2/27/2020    Procedure: RELEASE, CARPAL TUNNEL, Left;  Surgeon: Parish Lin MD;  Location: [facility]    SIGMOIDOSCOPY FLEXIBLE N/A 2/12/2025    Procedure: Sigmoidoscopy flexible;  Surgeon: Bulmaro Crespo MD;  Location: [facility]       SOCIAL HISTORY:  Social History     Socioeconomic History    Marital status: Single     Spouse name: Not on file    Number of children: Not on file    Years of education: Not on file    Highest education level: Not on file    Occupational History    Not on file   Tobacco Use    Smoking status: Every Day     Current packs/day: 0.25     Average packs/day: 0.3 packs/day for 15.0 years (3.8 ttl pk-yrs)     Types: Cigarettes    Smokeless tobacco: Never    Tobacco comments:     Trying to quit.   Vaping Use    Vaping status: Never Used   Substance and Sexual Activity    Alcohol use: No    Drug use: No    Sexual activity: Yes     Partners: Female   Other Topics Concern    Parent/sibling w/ CABG, MI or angioplasty before 65F 55M? Not Asked   Social History Narrative    Not on file     Social Drivers of Health     Financial Resource Strain: Low Risk  (2/22/2024)    Financial Resource Strain     Within the past 12 months, have you or your family members you live with been unable to get utilities (heat, electricity) when it was really needed?: No   Food Insecurity: High Risk (2/22/2024)    Food Insecurity     Within the past 12 months, did you worry that your food would run out before you got money to buy more?: No     Within the past 12 months, did the food you bought just not last and you didn t have money to get more?: Yes   Transportation Needs: Low Risk  (2/22/2024)    Transportation Needs     Within the past 12 months, has lack of transportation kept you from medical appointments, getting your medicines, non-medical meetings or appointments, work, or from getting things that you need?: No   Physical Activity: Unknown (2/22/2024)    Exercise Vital Sign     Days of Exercise per Week: 3 days     Minutes of Exercise per Session: Not on file   Stress: Stress Concern Present (2/22/2024)    [employer] - Occupational Stress Questionnaire     Feeling of Stress : To some extent   Social Connections: Unknown (2/22/2024)    Social Connection and Isolation Panel [NHANES]     Frequency of Communication with Friends and Family: Not on file     Frequency of Social Gatherings with Friends and Family: More than three times a week  "    Attends Pentecostal Services: Not on file     Active Member of Clubs or Organizations: Not on file     Attends Club or Organization Meetings: Not on file     Marital Status: Not on file   Interpersonal Safety: Low Risk  (2/12/2025)    Interpersonal Safety     Do you feel physically and emotionally safe where you currently live?: Yes     Within the past 12 months, have you been hit, slapped, kicked or otherwise physically hurt by someone?: No     Within the past 12 months, have you been humiliated or emotionally abused in other ways by your partner or ex-partner?: No   Housing Stability: Low Risk  (2/22/2024)    Housing Stability     Do you have housing? : Yes     Are you worried about losing your housing?: No       FAMILY HISTORY:  No family history on file.    Past/family/social history reviewed and no changes    PHYSICAL EXAMINATION:  Constitutional: aaox3, cooperative, pleasant, not dyspneic/diaphoretic, no acute distress  Vitals reviewed: /71 (BP Location: Left arm, Patient Position: Sitting, Cuff Size: Adult Regular)   Pulse 87   Ht 1.803 m (5' 11\")   Wt 90.9 kg (200 lb 4.8 oz)   SpO2 98%   BMI 27.94 kg/m    Wt:   Wt Readings from Last 2 Encounters:   04/08/25 90.9 kg (200 lb 4.8 oz)   08/22/24 88.7 kg (195 lb 9.6 oz)      Eyes: Sclera anicteric/injected  CV: No edema  Respiratory: Unlabored breathing    Skin: warm, perfused, no jaundice  Psych: Normal affect  MSK: Normal gait                    "

## 2025-04-08 NOTE — LETTER
4/8/2025      Parish Carter  [address] [address]      Dear Colleague,    Thank you for referring your patient, Parish Carter, to the [facility]. Please see a copy of my visit note below.    FOLLOW UP GI CLINIC VISIT    CC/REFERRING MD:  Thee Kunz  REASON FOR CONSULTATION:   Thee Kunz for   Chief Complaint   Patient presents with     Follow Up     Follow up for chronic constipation and blood in stools.          ASSESSMENT/PLAN: Patient here for follow-up of chronic constipation and recently identified rectal ulcer which seems to be secondary to his chronic constipation.  His current regimen with Linzess and OTC laxatives has not been effective enough.  We will therefore switch to Ibsrela, he will use his OTC laxatives until then.  Keep appointment for x-ray defecography and pelvic floor PT, as he likely has some associated pelvic floor dysfunction that is contributing here.  Will follow-up with patient 1 to 2 months from now.    1. Rectal ulcer (Primary)    2. Chronic constipation    3. Blood in stool    4. Irritable bowel syndrome with constipation  - Tenapanor HCl (IBSRELA) 50 MG TABS; Take 50 mg by mouth 2 times daily.  Dispense: 180 tablet; Refill: 0        RTC 2 months    Thank you for this consultation.  It was a pleasure to participate in the care of this patient; please contact us with any further questions.      25 minutes spent on the date of the encounter doing chart review, patient visit, and documentation    This note was created with voice recognition software, and while reviewed for accuracy, typos may remain.     Thee Kunz PA-C  Division of Gastroenterology, Hepatology and Nutrition  [facility]  Parish Carter is a 39 year old male that is seen for follow up in the GI clinic today.  I initially met with the patient just over a year ago for concerns of chronic constipation and intermittent  hematochezia.  He had described a lifelong history of constipation and this has been exacerbated by opioid use disorder earlier in his life.  Even after achieving sobriety from opioids, he continued to have challenges with constipation, often going 3 to 5 days between BMs, but sometimes more than a week.  Stool would typically be hard and dry and difficult to pass.  Based on the symptoms, we started him on lubiprostone and referred for colonoscopy.  His initial colonoscopy was aborted due to inadequate prep.  He returned the following day with continued prep and was noted to have a single cratered, partially circumferential ulcer in the rectum, possibly stercoral ulcer, biopsied and no signs of malignancy or infection.  It was recommended that we continue working on optimizing stool output.  He ultimately failed lubiprostone and we then switched to Linzess.  He has continued using this but still has multiple days between BMs.  Has also been using MiraLAX and Dulcolax and only getting stool output every week.  He did see colorectal surgery recently and they are recommending pelvic floor workup, has x-ray Kristin Beckham scheduled for next week and has been referred to the pelvic floor PT clinic.  He starts this in a few months.    ROS:    10 point ROS neg other than the symptoms noted above in the HPI.    PREVIOUS ENDOSCOPY:  Reviewed, see HPI    PERTINENT RELEVANT IMAGING OR LABS:  Reviewed    ALLERGIES:   No Known Allergies    PERTINENT MEDICATIONS:    Current Outpatient Medications:      bisacodyl (DULCOLAX) 5 MG EC tablet, Two days prior to exam take two (2) tablets at 4pm. One day prior to exam take two (2) tablets at 4pm, Disp: 4 tablet, Rfl: 0     bisacodyl (DULCOLAX) 5 MG EC tablet, 2 days prior to procedure, take 2 tablets at 4 pm. 1 day prior to procedure, take 2 tablets at 4 pm. For additional instructions refer to your colonoscopy prep instructions., Disp: 4 tablet, Rfl: 0     linaclotide (LINZESS) 145  MCG capsule, Take 1 capsule (145 mcg) by mouth every morning (before breakfast), Disp: 30 capsule, Rfl: 0     polyethylene glycol (MIRALAX) 17 GM/Dose powder, Day before exam at 5 PM start drinking an 8-ounce glass of the Miralax and Gatorade mixture every 15 minutes until the pitcher is HALF empty.  Store the rest in the refrigerator. At 10 PM start drinking an 8-ounce glass of Miralax and Gatorade mixture every 15 minutes until the pitcher is empty (about 4 glasses)., Disp: 238 g, Rfl: 0     Tenapanor HCl (IBSRELA) 50 MG TABS, Take 50 mg by mouth 2 times daily., Disp: 180 tablet, Rfl: 0    PROBLEM LIST  Patient Active Problem List    Diagnosis Date Noted     Tobacco abuse      Priority: Medium     Hand numbness 02/10/2020     Priority: Medium     Added automatically from request for surgery [id number]       Carpal tunnel syndrome on both sides 02/10/2020     Priority: Medium     Added automatically from request for surgery [id number]       History of heroin abuse (H) 01/09/2017     Priority: Medium     Sober since 2016.         PERTINENT PAST MEDICAL HISTORY:  Past Medical History:   Diagnosis Date     Substance abuse (H)     IV Heroin for 2 1/2 years.      Tobacco abuse        PREVIOUS SURGERIES:  Past Surgical History:   Procedure Laterality Date     COLONOSCOPY N/A 8/27/2024    Procedure: COLONOSCOPY, WITH POLYPECTOMY AND BIOPSY;  Surgeon: Clare Dover DO;  [facility]     COLONOSCOPY WITH CO2 INSUFFLATION N/A 8/27/2024    Procedure: Colonoscopy with CO2 insufflation;  Surgeon: Clare Dover DO;  [facility]: [facility]     COLONOSCOPY WITH CO2 INSUFFLATION N/A 8/26/2024    Procedure: Colonoscopy with CO2 insufflation;  Surgeon: Arron Ng MD;  Location: [facility]     RELEASE CARPAL TUNNEL Left 2/27/2020    Procedure: RELEASE, CARPAL TUNNEL, Left;  Surgeon: Parish Lin MD;  Location: [facility]     SIGMOIDOSCOPY FLEXIBLE N/A 2/12/2025    Procedure: Sigmoidoscopy flexible;  Surgeon: Bulmaro Crespo  MD JOSEPH;  Location: [facility]       SOCIAL HISTORY:  Social History     Socioeconomic History     Marital status: Single     Spouse name: Not on file     Number of children: Not on file     Years of education: Not on file     Highest education level: Not on file   Occupational History     Not on file   Tobacco Use     Smoking status: Every Day     Current packs/day: 0.25     Average packs/day: 0.3 packs/day for 15.0 years (3.8 ttl pk-yrs)     Types: Cigarettes     Smokeless tobacco: Never     Tobacco comments:     Trying to quit.   Vaping Use     Vaping status: Never Used   Substance and Sexual Activity     Alcohol use: No     Drug use: No     Sexual activity: Yes     Partners: Female   Other Topics Concern     Parent/sibling w/ CABG, MI or angioplasty before 65F 55M? Not Asked   Social History Narrative     Not on file     Social Drivers of Health     Financial Resource Strain: Low Risk  (2/22/2024)    Financial Resource Strain      Within the past 12 months, have you or your family members you live with been unable to get utilities (heat, electricity) when it was really needed?: No   Food Insecurity: High Risk (2/22/2024)    Food Insecurity      Within the past 12 months, did you worry that your food would run out before you got money to buy more?: No      Within the past 12 months, did the food you bought just not last and you didn t have money to get more?: Yes   Transportation Needs: Low Risk  (2/22/2024)    Transportation Needs      Within the past 12 months, has lack of transportation kept you from medical appointments, getting your medicines, non-medical meetings or appointments, work, or from getting things that you need?: No   Physical Activity: Unknown (2/22/2024)    Exercise Vital Sign      Days of Exercise per Week: 3 days      Minutes of Exercise per Session: Not on file   Stress: Stress Concern Present (2/22/2024)    [employer] - Occupational Stress Questionnaire      Feeling  "of Stress : To some extent   Social Connections: Unknown (2/22/2024)    Social Connection and Isolation Panel [NHANES]      Frequency of Communication with Friends and Family: Not on file      Frequency of Social Gatherings with Friends and Family: More than three times a week      Attends Caodaism Services: Not on file      Active Member of Clubs or Organizations: Not on file      Attends Club or Organization Meetings: Not on file      Marital Status: Not on file   Interpersonal Safety: Low Risk  (2/12/2025)    Interpersonal Safety      Do you feel physically and emotionally safe where you currently live?: Yes      Within the past 12 months, have you been hit, slapped, kicked or otherwise physically hurt by someone?: No      Within the past 12 months, have you been humiliated or emotionally abused in other ways by your partner or ex-partner?: No   Housing Stability: Low Risk  (2/22/2024)    Housing Stability      Do you have housing? : Yes      Are you worried about losing your housing?: No       FAMILY HISTORY:  No family history on file.    Past/family/social history reviewed and no changes    PHYSICAL EXAMINATION:  Constitutional: aaox3, cooperative, pleasant, not dyspneic/diaphoretic, no acute distress  Vitals reviewed: /71 (BP Location: Left arm, Patient Position: Sitting, Cuff Size: Adult Regular)   Pulse 87   Ht 1.803 m (5' 11\")   Wt 90.9 kg (200 lb 4.8 oz)   SpO2 98%   BMI 27.94 kg/m    Wt:   Wt Readings from Last 2 Encounters:   04/08/25 90.9 kg (200 lb 4.8 oz)   08/22/24 88.7 kg (195 lb 9.6 oz)      Eyes: Sclera anicteric/injected  CV: No edema  Respiratory: Unlabored breathing    Skin: warm, perfused, no jaundice  Psych: Normal affect  MSK: Normal gait                      Again, thank you for allowing me to participate in the care of your patient.        Sincerely,        Thee Kunz PA-C    Electronically signed"

## 2025-04-08 NOTE — NURSING NOTE
"Chief Complaint   Patient presents with    Follow Up     Follow up for chronic constipation and blood in stools.        Vitals:    04/08/25 1422   BP: 106/71   BP Location: Left arm   Patient Position: Sitting   Cuff Size: Adult Regular   Pulse: 87   SpO2: 98%   Weight: 90.9 kg (200 lb 4.8 oz)   Height: 1.803 m (5' 11\")     Body mass index is 27.94 kg/m .    Do you have a history of colon cancer in your immediate family? NO    Medications were reconciled.    Does patient need any medication refills at today's visit? Yes, Grey Stoddard CMA        "

## 2025-04-09 ENCOUNTER — TELEPHONE (OUTPATIENT)
Dept: GASTROENTEROLOGY | Facility: CLINIC | Age: 40
End: 2025-04-09
Payer: COMMERCIAL

## 2025-04-09 NOTE — TELEPHONE ENCOUNTER
Central Prior Authorization Team - Phone: [phone]     [facility]ly [facility], later [facility] [place]: [id number]

## 2025-04-11 NOTE — TELEPHONE ENCOUNTER
Retail Pharmacy Prior Authorization Team   Phone: [phone]    PA Initiation    Medication: IBSRELA 50 MG PO TABS  Insurance Company: [employer] [place] - Phone [phone] Fax [phone]  Pharmacy Filling the Rx: [facility] - [address]  Filling Pharmacy Phone: [phone]  Filling Pharmacy Fax:    Start Date: 4/11/2025    JOSUE MCCARTHY (Key: [id number])

## 2025-04-12 NOTE — TELEPHONE ENCOUNTER
Prior Authorization Approval    Medication: IBSRELA 50 MG PO TABS  Authorization Effective Date: 1/11/2025  Authorization Expiration Date: 4/11/2026  Insurance Company: [employer] [place] - Phone [phone] Fax [phone]  Which Pharmacy is filling the prescription: [facility] [id number]  Pharmacy Notified: YES  Patient Notified: YES (faxed approval letter to pharmacy and notified patient via [employer] message)

## 2025-04-28 ENCOUNTER — ANCILLARY PROCEDURE (OUTPATIENT)
Dept: GENERAL RADIOLOGY | Facility: CLINIC | Age: 40
End: 2025-04-28
Attending: SURGERY
Payer: COMMERCIAL

## 2025-04-28 DIAGNOSIS — K59.03 DRUG-INDUCED CONSTIPATION: ICD-10-CM

## 2025-04-28 PROCEDURE — 74019 RADEX ABDOMEN 2 VIEWS: CPT | Mod: 52 | Performed by: RADIOLOGY

## 2025-05-12 NOTE — PROGRESS NOTES
"Colon and Rectal Surgery Clinic Note    RE: Parish Carter.  : 1985.  ARNAV: 2025.    Reason for visit: follow up.    HPI: Parish Carter is a 39 year old male who presents today for a follow up. He has a past medical history of substance abuse (heroin) and constipation since childhood currently on Amitiza, Linzess, Doculax, Miralax, and Metamucil. He underwent a colonoscopy on 2024 for hematochezia and constipation and was found to have a single solitary 50mm cratered ulcer in the rectum, 2/3 circumferential. Surgical Pathology demonstrated active proctitis with ulcer and granulation tissue. Flexible sigmoidoscopy on 2025 demonstrated \"nonbleeding ulcerated mucosa with no stigmata of recent bleeding were present in the rectum. Roughly half lumen 40 mm, superficial.\"    Defecography was not completed due to the inability to administer rectal contrast. He was switched to Ibsrela. He started PFPT next month.      He follows with Dr. Dover in GI. He is a current smoker.     Interval History: Doing better over the past 2 weeks, tolerating diet, no prolapse.     Colonoscopy (2024):  Findings:       The perianal and digital rectal examinations were normal.        A single (solitary) 50 mm cratered ulcer was found in the rectum that        was 2/3 circumferential with clean base. Oozing was present. Biopsies        were taken with a cold forceps for histology.        A large amount of semi-solid stool was found in the entire colon,        precluding visualization. Procedure aborted in the tranverse colon due        to inadequate visualization                                                                                    Moderate Sedation:        Moderate Sedation was not administered   Impression:               - A single cratered, partially circumferential was                             found in the rectum. Possibly stercoral ulcer,                             unlikely location for ischemic " colitis, malignancy                             a possibilty as well. Biopsied.                             - Stool in the entire examined colon, procedure                             aborted in transverse colon due to inadequate prep        Surgical Pathology (8/27/2024):  Final Diagnosis   A. RECTAL ULCER:  - Active proctitis with ulcer and granulation tissue  - No epithelioid granulomas, dysplasia, or malignancy identified (supported by a keratin AE1/AE3 immunohistochemical stain)  - CMV and Treponema stains are negative      Flexible Sigmoidoscopy (2/12/2025):  Findings:       A large amount of semi-solid stool was found in the recto-sigmoid colon,        making visualization difficult.        Nonbleeding ulcerated mucosa with no stigmata of recent bleeding were        present in the rectum. Roughly half lumen 40 mm, superficial                                                                                    Impression:            - Preparation of the colon was poor.                          - Stool in the recto-sigmoid colon.                          - Mucosal ulceration.                          Prior bx Aug. 2024 non-concerning and not repeated.                          - No specimens collected.        Medications:  Current Outpatient Medications   Medication Sig Dispense Refill    bisacodyl (DULCOLAX) 5 MG EC tablet Two days prior to exam take two (2) tablets at 4pm. One day prior to exam take two (2) tablets at 4pm 4 tablet 0    bisacodyl (DULCOLAX) 5 MG EC tablet 2 days prior to procedure, take 2 tablets at 4 pm. 1 day prior to procedure, take 2 tablets at 4 pm. For additional instructions refer to your colonoscopy prep instructions. 4 tablet 0    linaclotide (LINZESS) 145 MCG capsule Take 1 capsule (145 mcg) by mouth every morning (before breakfast) 30 capsule 0    polyethylene glycol (MIRALAX) 17 GM/Dose powder Day before exam at 5 PM start drinking an 8-ounce glass of the Miralax and Gatorade mixture  "every 15 minutes until the pitcher is HALF empty.  Store the rest in the refrigerator. At 10 PM start drinking an 8-ounce glass of Miralax and Gatorade mixture every 15 minutes until the pitcher is empty (about 4 glasses). 238 g 0    Tenapanor HCl (IBSRELA) 50 MG TABS Take 50 mg by mouth 2 times daily. 180 tablet 0       Allergies:  No Known Allergies    Social history:   Social History     Tobacco Use    Smoking status: Every Day     Current packs/day: 0.25     Average packs/day: 0.3 packs/day for 15.0 years (3.8 ttl pk-yrs)     Types: Cigarettes    Smokeless tobacco: Never    Tobacco comments:     Trying to quit.   Substance Use Topics    Alcohol use: No     Marital status: single.    ROS:  A complete review of systems was performed with the patient and all systems negative except as per HPI.    Physical Examination:  /67 (BP Location: Left arm, Patient Position: Sitting, Cuff Size: Adult Regular)   Pulse 87   Ht 1.803 m (5' 11\")   Wt 89 kg (196 lb 1.6 oz)   SpO2 97%   BMI 27.35 kg/m    General: Well hydrated. No acute distress.  Abdomen: Soft, NT.      ASSESSMENT  This is a 39 year old M with constipation, h/o substance abuse, and a solitary rectal ulcer which has already been bx and did not show signs of malignancy. I believe this is related to his constipation. All questions were answered. Risks, benefits, and alternatives of operative treatment were thoroughly discussed with the patient, he understands these well and agrees to proceed.      All pertinent labs and imaging were personally reviewed by me.        PLAN  - Smoking cessation  - Repeat defecography  - Continue GI care  - Continue PFPT  - RTC in 1-2 months    30 minutes spent on the date of the encounter doing chart review, history and exam, imaging review, documentation and further activities as noted above.      Tee Mesa MD, FACS, FASCRS, FSSO    Division of Colon and Rectal Surgery  [facility] " [facility]    Referring Provider:  No referring provider defined for this encounter.     Primary Care Provider:  No Ref-Primary, Physician

## 2025-05-20 ENCOUNTER — OFFICE VISIT (OUTPATIENT)
Dept: SURGERY | Facility: CLINIC | Age: 40
End: 2025-05-20
Payer: COMMERCIAL

## 2025-05-20 VITALS
HEART RATE: 87 BPM | SYSTOLIC BLOOD PRESSURE: 103 MMHG | DIASTOLIC BLOOD PRESSURE: 67 MMHG | WEIGHT: 196.1 LBS | HEIGHT: 71 IN | BODY MASS INDEX: 27.45 KG/M2 | OXYGEN SATURATION: 97 %

## 2025-05-20 DIAGNOSIS — K62.6 RECTAL ULCER: Primary | ICD-10-CM

## 2025-05-20 DIAGNOSIS — K59.03 DRUG-INDUCED CONSTIPATION: ICD-10-CM

## 2025-05-20 PROCEDURE — 99214 OFFICE O/P EST MOD 30 MIN: CPT | Performed by: SURGERY

## 2025-05-20 ASSESSMENT — PAIN SCALES - GENERAL: PAINLEVEL_OUTOF10: NO PAIN (0)

## 2025-05-20 NOTE — NURSING NOTE
"Chief Complaint   Patient presents with    Follow Up       Vitals:    05/20/25 1435   BP: 103/67   BP Location: Left arm   Patient Position: Sitting   Cuff Size: Adult Regular   Pulse: 87   SpO2: 97%   Weight: 196 lb 1.6 oz   Height: 5' 11\"       Body mass index is 27.35 kg/m .    Helen Fountain, EMT  "

## 2025-05-20 NOTE — PATIENT INSTRUCTIONS
Plan for defecography followed by visit with Dr. Mesa to discuss results    Follow up:    Please call with any questions or concerns regarding your clinic visit today.    It is a pleasure being involved in your health care.    Contacts post-consultation depending on your need:    Radiology Appointments [phone]    Schedule Clinic Appointments [phone] # 1   M-F 7:30 - 5 pm    [facility] [phone]    Clinic Fax Number [phone]    Surgery Scheduling [phone]    My Chart is available 24 hours a day and is a secure way to access your records and communicate with your care team.  I strongly recommend signing up if you haven't already done so, if you are comfortable with computers.  If you would like to inquire about this or are having problems with My Chart access, you may call [phone] or go online at [email or web address].  Please allow at least 24 hours for a response and extra time on weekends and Holidays.

## 2025-05-20 NOTE — LETTER
"2025       RE: Parish Carter  [address] [id number]     Dear Colleague,    Thank you for referring your patient, Parish Carter, to the [facility] COLON AND RECTAL SURGERY CLINIC [place] at [facility]. Please see a copy of my visit note below.    Colon and Rectal Surgery Clinic Note    RE: Parish Carter.  : 1985.  ARNAV: 2025.    Reason for visit: follow up.    HPI: Parish Carter is a 39 year old male who presents today for a follow up. He has a past medical history of substance abuse (heroin) and constipation since childhood currently on Amitiza, Linzess, Doculax, Miralax, and Metamucil. He underwent a colonoscopy on 2024 for hematochezia and constipation and was found to have a single solitary 50mm cratered ulcer in the rectum, 2/3 circumferential. Surgical Pathology demonstrated active proctitis with ulcer and granulation tissue. Flexible sigmoidoscopy on 2025 demonstrated \"nonbleeding ulcerated mucosa with no stigmata of recent bleeding were present in the rectum. Roughly half lumen 40 mm, superficial.\"    Defecography was not completed due to the inability to administer rectal contrast. He was switched to Ibsrela. He started PFPT next month.      He follows with Dr. Dover in GI. He is a current smoker.     Interval History: Doing better over the past 2 weeks, tolerating diet, no prolapse.     Colonoscopy (2024):  Findings:       The perianal and digital rectal examinations were normal.        A single (solitary) 50 mm cratered ulcer was found in the rectum that        was 2/3 circumferential with clean base. Oozing was present. Biopsies        were taken with a cold forceps for histology.        A large amount of semi-solid stool was found in the entire colon,        precluding visualization. Procedure aborted in the tranverse colon due        to inadequate visualization                                  "                                                   Moderate Sedation:        Moderate Sedation was not administered   Impression:               - A single cratered, partially circumferential was                             found in the rectum. Possibly stercoral ulcer,                             unlikely location for ischemic colitis, malignancy                             a possibilty as well. Biopsied.                             - Stool in the entire examined colon, procedure                             aborted in transverse colon due to inadequate prep        Surgical Pathology (8/27/2024):  Final Diagnosis   A. RECTAL ULCER:  - Active proctitis with ulcer and granulation tissue  - No epithelioid granulomas, dysplasia, or malignancy identified (supported by a keratin AE1/AE3 immunohistochemical stain)  - CMV and Treponema stains are negative      Flexible Sigmoidoscopy (2/12/2025):  Findings:       A large amount of semi-solid stool was found in the recto-sigmoid colon,        making visualization difficult.        Nonbleeding ulcerated mucosa with no stigmata of recent bleeding were        present in the rectum. Roughly half lumen 40 mm, superficial                                                                                    Impression:            - Preparation of the colon was poor.                          - Stool in the recto-sigmoid colon.                          - Mucosal ulceration.                          Prior bx Aug. 2024 non-concerning and not repeated.                          - No specimens collected.        Medications:  Current Outpatient Medications   Medication Sig Dispense Refill     bisacodyl (DULCOLAX) 5 MG EC tablet Two days prior to exam take two (2) tablets at 4pm. One day prior to exam take two (2) tablets at 4pm 4 tablet 0     bisacodyl (DULCOLAX) 5 MG EC tablet 2 days prior to procedure, take 2 tablets at 4 pm. 1 day prior to procedure, take 2 tablets at 4 pm. For additional  "instructions refer to your colonoscopy prep instructions. 4 tablet 0     linaclotide (LINZESS) 145 MCG capsule Take 1 capsule (145 mcg) by mouth every morning (before breakfast) 30 capsule 0     polyethylene glycol (MIRALAX) 17 GM/Dose powder Day before exam at 5 PM start drinking an 8-ounce glass of the Miralax and Gatorade mixture every 15 minutes until the pitcher is HALF empty.  Store the rest in the refrigerator. At 10 PM start drinking an 8-ounce glass of Miralax and Gatorade mixture every 15 minutes until the pitcher is empty (about 4 glasses). 238 g 0     Tenapanor HCl (IBSRELA) 50 MG TABS Take 50 mg by mouth 2 times daily. 180 tablet 0       Allergies:  No Known Allergies    Social history:   Social History     Tobacco Use     Smoking status: Every Day     Current packs/day: 0.25     Average packs/day: 0.3 packs/day for 15.0 years (3.8 ttl pk-yrs)     Types: Cigarettes     Smokeless tobacco: Never     Tobacco comments:     Trying to quit.   Substance Use Topics     Alcohol use: No     Marital status: single.    ROS:  A complete review of systems was performed with the patient and all systems negative except as per HPI.    Physical Examination:  /67 (BP Location: Left arm, Patient Position: Sitting, Cuff Size: Adult Regular)   Pulse 87   Ht 1.803 m (5' 11\")   Wt 89 kg (196 lb 1.6 oz)   SpO2 97%   BMI 27.35 kg/m    General: Well hydrated. No acute distress.  Abdomen: Soft, NT.      ASSESSMENT  This is a 39 year old M with constipation, h/o substance abuse, and a solitary rectal ulcer which has already been bx and did not show signs of malignancy. I believe this is related to his constipation. All questions were answered. Risks, benefits, and alternatives of operative treatment were thoroughly discussed with the patient, he understands these well and agrees to proceed.      All pertinent labs and imaging were personally reviewed by me.        PLAN  - Smoking cessation  - Repeat defecography  - " Continue GI care  - Continue PFPT  - RTC in 1-2 months    30 minutes spent on the date of the encounter doing chart review, history and exam, imaging review, documentation and further activities as noted above.      Tee Mesa MD, FACS, FASCRS, FSSO    Division of Colon and Rectal Surgery  [facility]    Referring Provider:  No referring provider defined for this encounter.     Primary Care Provider:  No Ref-Primary, Physician      Again, thank you for allowing me to participate in the care of your patient.      Sincerely,    Tee Mesa MD

## 2025-06-07 ENCOUNTER — APPOINTMENT (OUTPATIENT)
Dept: CT IMAGING | Facility: CLINIC | Age: 40
End: 2025-06-07
Attending: EMERGENCY MEDICINE
Payer: COMMERCIAL

## 2025-06-07 ENCOUNTER — HOSPITAL ENCOUNTER (EMERGENCY)
Facility: CLINIC | Age: 40
Discharge: HOME | End: 2025-06-07
Attending: EMERGENCY MEDICINE | Admitting: EMERGENCY MEDICINE
Payer: COMMERCIAL

## 2025-06-07 ENCOUNTER — APPOINTMENT (OUTPATIENT)
Dept: MRI IMAGING | Facility: CLINIC | Age: 40
End: 2025-06-07
Attending: EMERGENCY MEDICINE
Payer: COMMERCIAL

## 2025-06-07 VITALS
SYSTOLIC BLOOD PRESSURE: 125 MMHG | OXYGEN SATURATION: 98 % | RESPIRATION RATE: 16 BRPM | HEART RATE: 74 BPM | WEIGHT: 197.3 LBS | TEMPERATURE: 98.8 F | BODY MASS INDEX: 27.62 KG/M2 | HEIGHT: 71 IN | DIASTOLIC BLOOD PRESSURE: 83 MMHG

## 2025-06-07 DIAGNOSIS — H53.2 DOUBLE VISION: ICD-10-CM

## 2025-06-07 LAB
ALBUMIN SERPL BCG-MCNC: 3.7 G/DL (ref 3.5–5.2)
ALP SERPL-CCNC: 132 U/L (ref 40–150)
ALT SERPL W P-5'-P-CCNC: 21 U/L (ref 0–70)
ANION GAP SERPL CALCULATED.3IONS-SCNC: 11 MMOL/L (ref 7–15)
APTT PPP: 38 SECONDS (ref 22–38)
AST SERPL W P-5'-P-CCNC: 29 U/L (ref 0–45)
BASOPHILS # BLD AUTO: 0 10E3/UL (ref 0–0.2)
BASOPHILS NFR BLD AUTO: 1 %
BILIRUB SERPL-MCNC: 0.4 MG/DL
BILIRUBIN DIRECT (ROCHE PRO & PURE): 0.15 MG/DL (ref 0–0.45)
BUN SERPL-MCNC: 25.9 MG/DL (ref 6–20)
CALCIUM SERPL-MCNC: 8.3 MG/DL (ref 8.8–10.4)
CHLORIDE SERPL-SCNC: 98 MMOL/L (ref 98–107)
CREAT SERPL-MCNC: 1.28 MG/DL (ref 0.67–1.17)
CRP SERPL-MCNC: 57.5 MG/L
EGFRCR SERPLBLD CKD-EPI 2021: 72 ML/MIN/1.73M2
EOSINOPHIL # BLD AUTO: 0.5 10E3/UL (ref 0–0.7)
EOSINOPHIL NFR BLD AUTO: 7 %
ERYTHROCYTE [DISTWIDTH] IN BLOOD BY AUTOMATED COUNT: 14.2 % (ref 10–15)
ERYTHROCYTE [SEDIMENTATION RATE] IN BLOOD BY WESTERGREN METHOD: 24 MM/HR (ref 0–15)
EST. AVERAGE GLUCOSE BLD GHB EST-MCNC: 111 MG/DL
ETHANOL SERPL-MCNC: <0.01 G/DL
GLUCOSE BLDC GLUCOMTR-MCNC: 206 MG/DL (ref 70–99)
GLUCOSE SERPL-MCNC: 193 MG/DL (ref 70–99)
HBA1C MFR BLD: 5.5 %
HCO3 SERPL-SCNC: 27 MMOL/L (ref 22–29)
HCT VFR BLD AUTO: 33.8 % (ref 40–53)
HGB BLD-MCNC: 10.8 G/DL (ref 13.3–17.7)
IMM GRANULOCYTES # BLD: 0 10E3/UL
IMM GRANULOCYTES NFR BLD: 0 %
INR PPP: 1.13 (ref 0.85–1.15)
LIPASE SERPL-CCNC: 23 U/L (ref 13–60)
LYMPHOCYTES # BLD AUTO: 1.9 10E3/UL (ref 0.8–5.3)
LYMPHOCYTES NFR BLD AUTO: 24 %
MAGNESIUM SERPL-MCNC: 2.1 MG/DL (ref 1.7–2.3)
MCH RBC QN AUTO: 24.6 PG (ref 26.5–33)
MCHC RBC AUTO-ENTMCNC: 32 G/DL (ref 31.5–36.5)
MCV RBC AUTO: 77 FL (ref 78–100)
MONOCYTES # BLD AUTO: 0.9 10E3/UL (ref 0–1.3)
MONOCYTES NFR BLD AUTO: 11 %
NEUTROPHILS # BLD AUTO: 4.6 10E3/UL (ref 1.6–8.3)
NEUTROPHILS NFR BLD AUTO: 58 %
NRBC # BLD AUTO: 0 10E3/UL
NRBC BLD AUTO-RTO: 0 /100
PLATELET # BLD AUTO: 158 10E3/UL (ref 150–450)
POTASSIUM SERPL-SCNC: 3.3 MMOL/L (ref 3.4–5.3)
PROT SERPL-MCNC: 7.1 G/DL (ref 6.4–8.3)
PROTHROMBIN TIME: 14.9 SECONDS (ref 11.8–14.8)
RBC # BLD AUTO: 4.39 10E6/UL (ref 4.4–5.9)
SODIUM SERPL-SCNC: 136 MMOL/L (ref 135–145)
TROPONIN T SERPL HS-MCNC: <6 NG/L
TSH SERPL DL<=0.005 MIU/L-ACNC: 1.45 UIU/ML (ref 0.3–4.2)
WBC # BLD AUTO: 8 10E3/UL (ref 4–11)

## 2025-06-07 PROCEDURE — 0042T CT HEAD PERFUSION W CONTRAST: CPT

## 2025-06-07 PROCEDURE — 83690 ASSAY OF LIPASE: CPT | Performed by: EMERGENCY MEDICINE

## 2025-06-07 PROCEDURE — 70498 CT ANGIOGRAPHY NECK: CPT

## 2025-06-07 PROCEDURE — 80048 BASIC METABOLIC PNL TOTAL CA: CPT | Performed by: EMERGENCY MEDICINE

## 2025-06-07 PROCEDURE — 258N000003 HC RX IP 258 OP 636: Performed by: EMERGENCY MEDICINE

## 2025-06-07 PROCEDURE — 70553 MRI BRAIN STEM W/O & W/DYE: CPT

## 2025-06-07 PROCEDURE — 83036 HEMOGLOBIN GLYCOSYLATED A1C: CPT | Performed by: EMERGENCY MEDICINE

## 2025-06-07 PROCEDURE — 96360 HYDRATION IV INFUSION INIT: CPT | Mod: 59 | Performed by: EMERGENCY MEDICINE

## 2025-06-07 PROCEDURE — 85652 RBC SED RATE AUTOMATED: CPT | Performed by: EMERGENCY MEDICINE

## 2025-06-07 PROCEDURE — 85610 PROTHROMBIN TIME: CPT | Performed by: EMERGENCY MEDICINE

## 2025-06-07 PROCEDURE — 86140 C-REACTIVE PROTEIN: CPT | Performed by: EMERGENCY MEDICINE

## 2025-06-07 PROCEDURE — 83735 ASSAY OF MAGNESIUM: CPT | Performed by: EMERGENCY MEDICINE

## 2025-06-07 PROCEDURE — 85730 THROMBOPLASTIN TIME PARTIAL: CPT | Performed by: EMERGENCY MEDICINE

## 2025-06-07 PROCEDURE — 250N000009 HC RX 250: Performed by: EMERGENCY MEDICINE

## 2025-06-07 PROCEDURE — 255N000002 HC RX 255 OP 636: Performed by: EMERGENCY MEDICINE

## 2025-06-07 PROCEDURE — 70553 MRI BRAIN STEM W/O & W/DYE: CPT | Mod: 26 | Performed by: RADIOLOGY

## 2025-06-07 PROCEDURE — 82077 ASSAY SPEC XCP UR&BREATH IA: CPT | Performed by: EMERGENCY MEDICINE

## 2025-06-07 PROCEDURE — 70450 CT HEAD/BRAIN W/O DYE: CPT

## 2025-06-07 PROCEDURE — 82248 BILIRUBIN DIRECT: CPT | Performed by: EMERGENCY MEDICINE

## 2025-06-07 PROCEDURE — 93005 ELECTROCARDIOGRAM TRACING: CPT | Performed by: EMERGENCY MEDICINE

## 2025-06-07 PROCEDURE — 70496 CT ANGIOGRAPHY HEAD: CPT | Mod: 26 | Performed by: RADIOLOGY

## 2025-06-07 PROCEDURE — 70498 CT ANGIOGRAPHY NECK: CPT | Mod: 26 | Performed by: RADIOLOGY

## 2025-06-07 PROCEDURE — 85025 COMPLETE CBC W/AUTO DIFF WBC: CPT | Performed by: EMERGENCY MEDICINE

## 2025-06-07 PROCEDURE — 36415 COLL VENOUS BLD VENIPUNCTURE: CPT | Performed by: EMERGENCY MEDICINE

## 2025-06-07 PROCEDURE — 999N000175 HC STATISTIC STROKE CODE W/ ACCESS

## 2025-06-07 PROCEDURE — 99285 EMERGENCY DEPT VISIT HI MDM: CPT | Mod: 25 | Performed by: EMERGENCY MEDICINE

## 2025-06-07 PROCEDURE — 250N000011 HC RX IP 250 OP 636: Performed by: EMERGENCY MEDICINE

## 2025-06-07 PROCEDURE — 0042T CT HEAD PERFUSION W CONTRAST: CPT | Performed by: RADIOLOGY

## 2025-06-07 PROCEDURE — 84443 ASSAY THYROID STIM HORMONE: CPT | Performed by: EMERGENCY MEDICINE

## 2025-06-07 PROCEDURE — 99291 CRITICAL CARE FIRST HOUR: CPT | Mod: 25 | Performed by: EMERGENCY MEDICINE

## 2025-06-07 PROCEDURE — 84484 ASSAY OF TROPONIN QUANT: CPT | Performed by: EMERGENCY MEDICINE

## 2025-06-07 PROCEDURE — A9585 GADOBUTROL INJECTION: HCPCS | Performed by: EMERGENCY MEDICINE

## 2025-06-07 PROCEDURE — 93010 ELECTROCARDIOGRAM REPORT: CPT | Performed by: EMERGENCY MEDICINE

## 2025-06-07 PROCEDURE — 82962 GLUCOSE BLOOD TEST: CPT

## 2025-06-07 RX ORDER — GADOBUTROL 604.72 MG/ML
10 INJECTION INTRAVENOUS ONCE
Status: COMPLETED | OUTPATIENT
Start: 2025-06-07 | End: 2025-06-07

## 2025-06-07 RX ORDER — IOPAMIDOL 755 MG/ML
117 INJECTION, SOLUTION INTRAVASCULAR ONCE
Status: COMPLETED | OUTPATIENT
Start: 2025-06-07 | End: 2025-06-07

## 2025-06-07 RX ADMIN — SODIUM CHLORIDE 100 ML: 9 INJECTION, SOLUTION INTRAVENOUS at 01:17

## 2025-06-07 RX ADMIN — IOPAMIDOL 117 ML: 755 INJECTION, SOLUTION INTRAVENOUS at 01:17

## 2025-06-07 RX ADMIN — SODIUM CHLORIDE 1000 ML: 0.9 INJECTION, SOLUTION INTRAVENOUS at 02:39

## 2025-06-07 RX ADMIN — GADOBUTROL 9 ML: 604.72 INJECTION INTRAVENOUS at 04:12

## 2025-06-07 ASSESSMENT — ACTIVITIES OF DAILY LIVING (ADL)
ADLS_ACUITY_SCORE: 41

## 2025-06-07 ASSESSMENT — COLUMBIA-SUICIDE SEVERITY RATING SCALE - C-SSRS
1. IN THE PAST MONTH, HAVE YOU WISHED YOU WERE DEAD OR WISHED YOU COULD GO TO SLEEP AND NOT WAKE UP?: NO
6. HAVE YOU EVER DONE ANYTHING, STARTED TO DO ANYTHING, OR PREPARED TO DO ANYTHING TO END YOUR LIFE?: NO
2. HAVE YOU ACTUALLY HAD ANY THOUGHTS OF KILLING YOURSELF IN THE PAST MONTH?: NO

## 2025-06-07 NOTE — PROGRESS NOTES
Arrival to Stroke Code: 0054    Stroke Team escalate/de-escalate interventions: CT/CTA and perfusion. De-escalated by Reza Kowalski MD at 0123.    ED/Bedside Nurse providing handoff: 0055 Wolf ED RN    Time left for CT: 0055    Time Returned to ED/Unit: 0117    ED/Bedside Nurse given handoff to & Time: Wolf, ED RN came with to CT and back.

## 2025-06-07 NOTE — DISCHARGE INSTRUCTIONS
Please make an appointment to follow up with Your Primary Care Provider as soon as possible.    Return for worsening headache, confusion, fever, neck stiffness, recurrent vomiting, weakness or numbness of your face or extremities, slurred speech, blindness, or other signs of a stroke.

## 2025-06-07 NOTE — CONSULTS
[facility]     Stroke Code Note          History of Present Illness     Chief Complaint: Dizziness, Eye Problem, and Headache (Pt ambulatory to the ED for evaluation of headaches, dizziness, and double vision. )      Parish Carter is a 39 year old with past medical history significant for heroin use (sober as of 2 years ago), tobacco use, cocaine use (sober as of 6 months ago) and chronic constipation with known rectal ulcer presenting as a tier 2 stroke code for 2 days of headache, subsequently followed the next day by dizziness and double vision.    Symptoms started with a headache 2 days ago dominantly of the occipital region.  Then, upon awakening yesterday, 6/6 patient felt dizzy with blurred/double vision that worsened throughout the day at work.  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  He also states that he has only been sleeping 1 to 2 hours per night due to chronic abdominal pain from known constipation and rectal ulcer.  He follows with GI outpatient and has had multiple colonoscopies.    The double vision is binocular and gets worse when he covers 1 eye.  It is also worse when he looks to the left.  It is overall improved since arrival to the emergency department.  While laying down his symptoms are much improved.  Dizziness feels like wobbling and unsteadiness that occurred most noticeably when he stood up from the CT scanner during the stroke code.    Overall, vitals were reassuring with systolic blood pressure at 126.  Patient is afebrile and satting at 98% on room air.  Neurological examination was intact with a NIH stroke scale score of 0.  CT head and CTA head and neck were negative for any acute abnormalities including hemorrhage, acute infarct or large vessel occlusion.  Patient atient was not a candidate for TNK (outside the time window) or thrombectomy but absence of LVO and  minimal/resolving symptoms with NIH of 0.  Stroke code was de-escalated at approximately 012 3 AM 6/7/2025.    Patient states that he has a family history significant for heart disease.  He states he has not been sleeping well recently, he has not been staying adequately hydrated and feels like he is drinking too much caffeine.         Past Medical History     Stroke risk factors: Tobacco use, history of substance use disorder    Preadmission antithrombotic regimen: None    Modified Harding Score (Pre-morbid)  0 - No symptoms.                   Assessment and Plan       Occipital headache  Dizziness  Binocular diplopia    Patient is a 39-year-old male with past medical history of substance use and current tobacco use, chronic constipation presenting as a stroke code for headache, dizziness and diplopia.  Symptoms have improved since arrival to the emergency department and reassuringly his neurological exam is intact.  NIH stroke scale score of 0.  CT/CTA head and neck negative for hemorrhage, infarct or large vessel occlusion.  He was deemed not to be a candidate for TNK due to being outside of the therapeutic window and due to absence of LVO was not a candidate for thrombectomy.  Though no gaze palsy is seen on exam, will obtain brain MRI to rule out small brainstem or cerebellar infarct and due to persistent headache, rule out any meningeal disease.  If negative, would focus on supportive cares such as fluid resuscitation and further workup for alternative causes of nonspecific symptoms.  Agree with checking TSH and would also check a urine drug screen.  It may be that the primary drivers in this case are due to his poor sleep, poor hydration and working outside in poor air quality due to the recent smoke from the [facility] virocyts.      Intravenous Thrombolysis  Not given due to:   - minor/isolated/quickly resolving symptoms  - unclear or unfavorable risk-benefit profile for extended window thrombolysis beyond the  conventional 4.5 hour time window     Endovascular Treatment  Not initiated due to absence of proximal vessel occlusion     Plan:  -MRI brain with and without contrast  -TSH with reflex T4  -CBC, BMP  -UDS  -IV fluids  ___________________________________________________________________    The patient was discussed with Stroke Fellow, Dr. Tim Larios.  The Stroke Staff is Dr. Gallardo.    Karan Kowalski MD  Neurology Resident  ___________________________________________________________________        Imaging/Labs   (personally reviewed)    IMPRESSION:  HEAD CT:  1.  No acute intracranial hemorrhage identified.     2.  No CT evidence acute infarct. Aspect score 10.    HEAD CTA:   1.  No intracranial arterial large vessel occlusion or significant stenosis.     2.  No brain aneurysm identified.     3.  No AVM/AVF.     NECK CTA:  1.  No significant stenosis or occlusion identified. No cervical artery dissection.    CT perfusion:  PERFUSION MAPS: No acute perfusion abnormality.          Physical Examination     BP: 126/87   Pulse: 69   Resp: 16   Temp: 98.8  F (37.1  C)   Temp src: Oral   SpO2: 98 %   O2 Device: None (Room air)   Weight: 89.5 kg (197 lb 4.8 oz)    Wt Readings from Last 2 Encounters:   06/07/25 89.5 kg (197 lb 4.8 oz)   05/20/25 89 kg (196 lb 1.6 oz)       General Exam  General:  patient lying in bed without any acute distress    HEENT:  normocephalic/atraumatic  Cardio:  RRR  Pulmonary:  no respiratory distress  Abdomen:  non-tender, non-distended  Extremities:  no edema  Skin:  intact, warm/dry     Neuro Exam  Mental Status:  alert, oriented x 3, follows commands, speech clear and fluent, naming and repetition normal  Cranial Nerves:  visual fields intact, PERRL, EOMI with normal smooth pursuit, facial sensation intact and symmetric, facial movements symmetric, hearing not formally tested but intact to conversation, palate elevation symmetric and uvula midline, no dysarthria, shoulder shrug strong bilaterally,  tongue protrusion midline  Motor:  normal muscle tone and bulk, no abnormal movements, able to move all limbs spontaneously, no pronator drift  Reflexes:  no clonus, toes down-going  Sensory:  light touch sensation intact and symmetric throughout upper and lower extremities, no extinction on double simultaneous stimulation   Coordination:  normal finger-to-nose and heel-to-shin bilaterally without dysmetria  Station/Gait:  normal width, turn, arm swing        Stroke Scales       NIHSS  1a. Level of Consciousness 0-->Alert, keenly responsive   1b. LOC Questions 0-->Answers both questions correctly   1c. LOC Commands 0-->Performs both tasks correctly   2.   Best Gaze 0-->Normal   3.   Visual 0-->No visual loss   4.   Facial Palsy 0-->Normal symmetrical movements   5a. Motor Arm, Left 0-->No drift, limb holds 90 (or 45) degrees for full 10 secs   5b. Motor Arm, Right 0-->No drift, limb holds 90 (or 45) degrees for full 10 secs   6a. Motor Leg, Left 0-->No drift, leg holds 30 degree position for full 5 secs   6b. Motor Leg, right 0-->No drift, leg holds 30 degree position for full 5 secs   7.   Limb Ataxia 0-->Absent   8.   Sensory 0-->Normal, no sensory loss   9.   Best Language 0-->No aphasia, normal   10. Dysarthria 0-->Normal   11. Extinction and Inattention  0-->No abnormality   Total 0 (06/07/25 0111)            Labs     CBC  Lab Results   Component Value Date    HGB 10.8 (L) 06/07/2025    HCT 33.8 (L) 06/07/2025    WBC 8.0 06/07/2025     06/07/2025       BMP  Lab Results   Component Value Date     06/07/2025    POTASSIUM 3.3 (L) 06/07/2025    CHLORIDE 98 06/07/2025    CO2 27 06/07/2025    BUN 25.9 (H) 06/07/2025    CR 1.28 (H) 06/07/2025     (H) 06/07/2025    LIZETT 8.3 (L) 06/07/2025       INR  INR   Date Value Ref Range Status   06/07/2025 1.13 0.85 - 1.15 Final       Data   Stroke Code Data  (for stroke code without tele)  Stroke code activated 06/07/25  0053   First stroke provider response  "06/07/25  0056   Last known normal        Time of discovery (or onset of symptoms) 06/05/25      Head CT read by Stroke Neuro Provider 06/07/25  0110   Was stroke code de-escalated? Yes  06/07/25  0123        Clinically Significant Risk Factors Present on Admission        # Hypokalemia: Lowest K = 3.3 mmol/L in last 2 days, will replace as needed         # Coagulation Defect: INR = 1.13 (Ref range: 0.85 - 1.15) and/or PTT = 38 Seconds (Ref range: 22 - 38 Seconds), will monitor for bleeding         # Anemia: based on hgb <11       # Overweight: Estimated body mass index is 27.52 kg/m  as calculated from the following:    Height as of this encounter: 1.803 m (5' 11\").    Weight as of this encounter: 89.5 kg (197 lb 4.8 oz).              "

## 2025-06-07 NOTE — ED TRIAGE NOTES
Pt came to the ED for evaluation of dizziness, headache, double vision. Pt says the symptoms have been going on for a week now. Pt also endorses being really fatigue for the last couple of days as well. Pt hasn't been sleeping well due to stomach pain.      Triage Assessment (Adult)       Row Name 06/07/25 0038          Triage Assessment    Airway WDL WDL        Respiratory WDL    Respiratory WDL WDL        Skin Circulation/Temperature WDL    Skin Circulation/Temperature WDL WDL        Cardiac WDL    Cardiac WDL WDL     Cardiac Rhythm NSR        Peripheral/Neurovascular WDL    Peripheral Neurovascular WDL WDL        Cognitive/Neuro/Behavioral WDL    Cognitive/Neuro/Behavioral WDL WDL

## 2025-06-07 NOTE — ED PROVIDER NOTES
ED Provider Note  [facility]      History     Chief Complaint   Patient presents with    Dizziness    Eye Problem    Headache     Pt ambulatory to the ED for evaluation of headaches, dizziness, and double vision.      UMM Carter is a 39 year old male with a history of substance abuse who presents to the emergency department for dizziness. The patient states that he developed a headache in the back of his head 2 days ago. He rates is pain a 2/10 but adds that pain is constant. Around 9am yesterday he developed blurred and double vision. He feels dizzy when standing and feels like the room is spinning. He has felt unsteady at work. He does not have any blind spots. Thinks he had some slurred speech today. Denies weakness or numbness.     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     Tobacco abuse      Past Surgical History:   Procedure Laterality Date    COLONOSCOPY N/A 8/27/2024    Procedure: COLONOSCOPY, WITH POLYPECTOMY AND BIOPSY;  Surgeon: Clare Dover DO;  Location: [facility] OR    COLONOSCOPY WITH CO2 INSUFFLATION N/A 8/27/2024    Procedure: Colonoscopy with CO2 insufflation;  Surgeon: Clare Dover DO;  Location: [facility]    COLONOSCOPY WITH CO2 INSUFFLATION N/A 8/26/2024    Procedure: Colonoscopy with CO2 insufflation;  Surgeon: Arron Ng MD;  Location: [facility] OR    RELEASE CARPAL TUNNEL Left 2/27/2020    Procedure: RELEASE, CARPAL TUNNEL, Left;  Surgeon: Parish Lin MD;  Location: [facility] OR    SIGMOIDOSCOPY FLEXIBLE N/A 2/12/2025    Procedure: Sigmoidoscopy flexible;  Surgeon: Bulmaro Crespo MD;  Location: [facility]     bisacodyl (DULCOLAX) 5 MG EC tablet  bisacodyl (DULCOLAX) 5 MG EC tablet  linaclotide (LINZESS) 145 MCG capsule  polyethylene glycol (MIRALAX) 17 GM/Dose powder  Tenapanor HCl (IBSRELA) 50 MG TABS      No Known Allergies  Family History  No family history on file.  Social  "History   Social History     Tobacco Use    Smoking status: Every Day     Current packs/day: 0.25     Average packs/day: 0.3 packs/day for 15.0 years (3.8 ttl pk-yrs)     Types: Cigarettes    Smokeless tobacco: Never    Tobacco comments:     Trying to quit.   Vaping Use    Vaping status: Never Used   Substance Use Topics    Alcohol use: No    Drug use: No      A medically appropriate review of systems was performed with pertinent positives and negatives noted in the HPI, and all other systems negative.    Physical Exam   BP: 129/88  Pulse: 75  Temp: 98.8  F (37.1  C)  Resp: 17  Height: 180.3 cm (5' 11\")  Weight: 89.5 kg (197 lb 4.8 oz)  SpO2: 98 %  Physical Exam  Vitals and nursing note reviewed.   Constitutional:       General: He is not in acute distress.     Appearance: Normal appearance. He is well-developed. He is not ill-appearing, toxic-appearing or diaphoretic.   HENT:      Head: Normocephalic and atraumatic.      Nose: Nose normal.      Mouth/Throat:      Mouth: Mucous membranes are moist.   Eyes:      General: Lids are normal. No scleral icterus.     Extraocular Movements:      Right eye: Nystagmus present.      Left eye: Nystagmus present.      Conjunctiva/sclera: Conjunctivae normal.   Cardiovascular:      Rate and Rhythm: Normal rate.      Heart sounds: Normal heart sounds.   Pulmonary:      Effort: Pulmonary effort is normal. No respiratory distress.      Breath sounds: Normal breath sounds. No stridor.   Abdominal:      General: There is no distension.      Palpations: Abdomen is soft.      Tenderness: There is no abdominal tenderness.   Musculoskeletal:         General: No deformity. Normal range of motion.      Cervical back: Normal range of motion and neck supple. No rigidity.   Skin:     General: Skin is warm and dry.      Coloration: Skin is not jaundiced or pale.      Findings: No rash.   Neurological:      Mental Status: He is alert and oriented to person, place, and time.      GCS: GCS eye " subscore is 4. GCS verbal subscore is 5. GCS motor subscore is 6.      Cranial Nerves: No dysarthria or facial asymmetry.      Motor: No weakness or tremor.      Coordination: Finger-Nose-Finger Test normal.      Comments: Horizontal nystagmus on leftward and rightward gaze   Psychiatric:         Behavior: Behavior normal.         Thought Content: Thought content normal.           ED Course, Procedures, & Data      Procedures            EKG Interpretation:      Interpreted by Kaylan Eagle MD  Time reviewed: 0140  Symptoms at time of EKG: Dizziness  Rhythm: normal sinus   Rate: normal  Axis: normal  Ectopy: none  Conduction: normal  ST Segments/ T Waves: No ST-T wave changes  Q Waves: none  Comparison to prior: No old EKG available    Clinical Impression: normal EKG        The patient has stroke symptoms:         ED Stroke specific documentation           NIHSS PDF     Patient last known well time: 0859 6/6/25  ED Provider first to bedside at: 12:52 AM  CT Results received at: 0142    Thrombolytics:   Not given due to:   - minor/isolated/quickly resolving symptoms    If treating with thrombolytics: Ensure SBP<180 and DBP<105 prior to treatment with thrombolytics.  Administering thrombolytics after treatment with IV labetalol, hydralazine, or nicardipine is reasonable once BP control is established.    Endovascular Retrieval:  Not initiated due to absence of proximal vessel occlusion    National [employer] Stroke Scale (Baseline)  Time Performed: 12:55 AM     Score    Level of consciousness: (0)   Alert, keenly responsive    LOC questions: (0)   Answers both questions correctly    LOC commands: (0)   Performs both tasks correctly    Best gaze: (0)   Normal    Visual: (0)   No visual loss    Facial palsy: (0)   Normal symmetrical movements    Motor arm (left): (0)   No drift    Motor arm (right): (0)   No drift    Motor leg (left): (0)   No drift    Motor leg (right): (0)   No drift    Limb ataxia: (0)    Absent    Sensory: (0)   Normal- no sensory loss    Best language: (0)   Normal- no aphasia    Dysarthria: (0)   Normal    Extinction and inattention: (0)   No abnormality        Total Score:  0        Stroke Mimics were considered (including migraine headache, seizure disorder, hypoglycemia (or hyperglycemia), head or spinal trauma, CNS infection, Toxin ingestion and shock state (e.g. sepsis) .                  Results for orders placed or performed during the hospital encounter of 06/07/25   CT Head w/o Contrast     Status: None    Narrative    EXAM: CT HEAD W/O CONTRAST  LOCATION: [facility]  DATE: 6/7/2025    INDICATION: Code Stroke, rule out hemorrhage and evaluate for potential thrombolysis thrombectomy. Double vision, headache, dizziness  COMPARISON: None.  TECHNIQUE: Routine CT Head without IV contrast. Multiplanar reformats. Dose reduction techniques were used.    FINDINGS:  INTRACRANIAL CONTENTS: No intracranial hemorrhage, extraaxial collection, or mass effect.  No CT evidence of acute infarct. Normal parenchymal attenuation. Normal ventricles and sulci.     VISUALIZED ORBITS/SINUSES/MASTOIDS: No intraorbital abnormality. Left posterior ethmoidal air cell completely opacified. Remaining visualized paranasal sinuses and mastoid air cells are essentially clear.    BONES/SOFT TISSUES: No acute abnormality.      Impression    IMPRESSION:  1.  No acute intracranial hemorrhage identified.    2.  No CT evidence acute infarct. Aspect score 10.    Dr Kaylan Eagle was notified by Dr Atul Duncan at  1:42 AM 6/7/2025 CST/CDT.   CTA Head Neck with Contrast     Status: None    Narrative    EXAM: CTA HEAD NECK W CONTRAST  LOCATION: [facility]  DATE: 6/7/2025    INDICATION: Code Stroke, evaluate for LVO. Double vision, headache, dizziness  COMPARISON: None.  CONTRAST: Isovue 370  TECHNIQUE: Head and neck CT angiogram with IV  contrast. Axial helical CT images of the head and neck vessels obtained during the arterial phase of intravenous contrast administration. Axial 2D reconstructed images and multiplanar 3D MIP reconstructed   images of the head and neck vessels were performed by the technologist. Dose reduction techniques were used. All stenosis measurements made according to NASCET criteria unless otherwise specified.    FINDINGS:   HEAD CTA:  No significant stenosis or occlusion.      No brain aneurysm. No AVM/AVF.      NECK CTA:  RIGHT CAROTID:   No significant stenosis/occlusion. No dissection.    LEFT CAROTID:   No significant stenosis/occlusion. No dissection.    VERTEBRAL ARTERIES:  No significant stenosis or occlusion. No dissection.      AORTIC ARCH: Classic aortic arch anatomy. No significant stenosis at the origin of the great vessels.    ARTERIAL PLAQUE: No significant plaque.    NONVASCULAR STRUCTURES:   Right cervical posterior subcutaneous soft tissues suspicious cyst. Multiple small and prominent lymph nodes within the neck. Dental disease with multiple cavities and periapical lucencies.        Impression     IMPRESSION:   HEAD CTA:   1.  No intracranial arterial large vessel occlusion or significant stenosis.    2.  No brain aneurysm identified.    3.  No AVM/AVF.    NECK CTA:  1.  No significant stenosis or occlusion identified. No cervical artery dissection.      Dr Kaylan Eagle was notified by Dr Atul Duncan at  1:42 AM 6/7/2025 CST/CDT.   CT Head Perfusion w Contrast - For Tier 2 Stroke     Status: None    Narrative    EXAM: CT HEAD PERFUSION W CONTRAST  LOCATION: [facility]  DATE: 6/7/2025    INDICATION: Code Stroke to evaluate for potential thrombolysis and thrombectomy. Evaluate mismatch between penumbra and core infarct.  Double vision, headache, dizziness  COMPARISON: None.  TECHNIQUE: CT cerebral perfusion was performed utilizing a second contrast bolus.  Perfusion data were post processed with generation of standard perfusion maps and estimation of ischemic/infarcted volumes utilizing standard threshold values. Dose   reduction techniques were used.    CONTRAST: Isovue 370    CT PERFUSION:  PERFUSION MAPS: No acute perfusion abnormality.    RAPID ANALYSIS:  CBF<30%: 0 mL  Tmax>6sec: 0 mL  Mismatch volume: 0 mL  Mismatch ratio: None      Dr Kaylan Eagle was notified by Dr Atul Duncan at  1:42 AM 6/7/2025 CST/CDT.     MR Brain w/o & w Contrast     Status: None    Narrative    EXAM: MR BRAIN W/O and W CONTRAST  LOCATION: [facility]  DATE: 6/7/2025    INDICATION: stroke  COMPARISON: CTA head and neck June 7, 2025   CONTRAST: 9mL Gadavist  TECHNIQUE: Routine multiplanar multisequence head MRI without and with intravenous contrast.    FINDINGS:  INTRACRANIAL CONTENTS: No acute or subacute infarct. No mass, acute hemorrhage, or extra-axial fluid collections. Normal brain parenchymal signal. Normal ventricles and sulci. Normal position of the cerebellar tonsils. No pathologic contrast enhancement.    SELLA: No abnormality accounting for technique.    OSSEOUS STRUCTURES/SOFT TISSUES: Normal marrow signal. The major intracranial vascular flow voids are maintained.     ORBITS: No abnormality accounting for technique.     SINUSES/MASTOIDS: No paranasal sinus mucosal disease. No middle ear or mastoid effusion.       Impression    IMPRESSION:  1.  Normal head MRI.   Basic metabolic panel     Status: Abnormal   Result Value Ref Range    Sodium 136 135 - 145 mmol/L    Potassium 3.3 (L) 3.4 - 5.3 mmol/L    Chloride 98 98 - 107 mmol/L    Carbon Dioxide (CO2) 27 22 - 29 mmol/L    Anion Gap 11 7 - 15 mmol/L    Urea Nitrogen 25.9 (H) 6.0 - 20.0 mg/dL    Creatinine 1.28 (H) 0.67 - 1.17 mg/dL    GFR Estimate 72 >60 mL/min/1.73m2    Calcium 8.3 (L) 8.8 - 10.4 mg/dL    Glucose 193 (H) 70 - 99 mg/dL   INR     Status: Abnormal   Result Value Ref  Range    INR 1.13 0.85 - 1.15    PT 14.9 (H) 11.8 - 14.8 Seconds   Partial thromboplastin time     Status: Normal   Result Value Ref Range    aPTT 38 22 - 38 Seconds   Troponin T, High Sensitivity     Status: Normal   Result Value Ref Range    Troponin T, High Sensitivity <6 <=22 ng/L   Glucose by meter     Status: Abnormal   Result Value Ref Range    GLUCOSE BY METER POCT 206 (H) 70 - 99 mg/dL   CBC with platelets and differential     Status: Abnormal   Result Value Ref Range    WBC Count 8.0 4.0 - 11.0 10e3/uL    RBC Count 4.39 (L) 4.40 - 5.90 10e6/uL    Hemoglobin 10.8 (L) 13.3 - 17.7 g/dL    Hematocrit 33.8 (L) 40.0 - 53.0 %    MCV 77 (L) 78 - 100 fL    MCH 24.6 (L) 26.5 - 33.0 pg    MCHC 32.0 31.5 - 36.5 g/dL    RDW 14.2 10.0 - 15.0 %    Platelet Count 158 150 - 450 10e3/uL    % Neutrophils 58 %    % Lymphocytes 24 %    % Monocytes 11 %    % Eosinophils 7 %    % Basophils 1 %    % Immature Granulocytes 0 %    NRBCs per 100 WBC 0 <1 /100    Absolute Neutrophils 4.6 1.6 - 8.3 10e3/uL    Absolute Lymphocytes 1.9 0.8 - 5.3 10e3/uL    Absolute Monocytes 0.9 0.0 - 1.3 10e3/uL    Absolute Eosinophils 0.5 0.0 - 0.7 10e3/uL    Absolute Basophils 0.0 0.0 - 0.2 10e3/uL    Absolute Immature Granulocytes 0.0 <=0.4 10e3/uL    Absolute NRBCs 0.0 10e3/uL   Hemoglobin A1c     Status: Normal   Result Value Ref Range    Estimated Average Glucose 111 <117 mg/dL    Hemoglobin A1C 5.5 <5.7 %   TSH with free T4 reflex     Status: Normal   Result Value Ref Range    TSH 1.45 0.30 - 4.20 uIU/mL   Extra Tube     Status: None (In process)    Narrative    The following orders were created for panel order Extra Tube.  Procedure                               Abnormality         Status                     ---------                               -----------         ------                     Extra Red Top Tube[[id number]]                              In process                   Please view results for these tests on the individual orders.    CRP inflammation     Status: Abnormal   Result Value Ref Range    CRP Inflammation 57.50 (H) <5.00 mg/L   Erythrocyte sedimentation rate auto     Status: Abnormal   Result Value Ref Range    Erythrocyte Sedimentation Rate 24 (H) 0 - 15 mm/hr   Extra Tube     Status: None (In process)    Narrative    The following orders were created for panel order Extra Tube.  Procedure                               Abnormality         Status                     ---------                               -----------         ------                     Extra Green Top (Lithiu...[[id number]]                      In process                   Please view results for these tests on the individual orders.   Ethanol Level Blood     Status: Normal   Result Value Ref Range    Ethanol Level Blood <0.01 <=0.01 g/dL   Magnesium     Status: Normal   Result Value Ref Range    Magnesium 2.1 1.7 - 2.3 mg/dL   Lipase     Status: Normal   Result Value Ref Range    Lipase 23 13 - 60 U/L   Hepatic function panel     Status: Normal   Result Value Ref Range    Protein Total 7.1 6.4 - 8.3 g/dL    Albumin 3.7 3.5 - 5.2 g/dL    Bilirubin Total 0.4 <=1.2 mg/dL    Alkaline Phosphatase 132 40 - 150 U/L    AST 29 0 - 45 U/L    ALT 21 0 - 70 U/L    Bilirubin Direct 0.15 0.00 - 0.45 mg/dL   CBC with Platelets & Differential     Status: Abnormal    Narrative    The following orders were created for panel order CBC with Platelets & Differential.  Procedure                               Abnormality         Status                     ---------                               -----------         ------                     CBC with platelets and ...[[id number]]  Abnormal            Final result                 Please view results for these tests on the individual orders.     Medications   iopamidol (ISOVUE-370) solution 117 mL (117 mLs Intravenous $Given 6/7/25 0117)     And   sodium chloride 0.9 % bag for CT scan flush (100 mLs As instructed $Given 6/7/25 0117)   sodium  chloride 0.9% BOLUS 1,000 mL (0 mLs Intravenous Stopped 6/7/25 6981)   gadobutrol (GADAVIST) injection 10 mL (9 mLs Intravenous $Given 6/7/25 4389)     Labs Ordered and Resulted from Time of ED Arrival to Time of ED Departure   BASIC METABOLIC PANEL - Abnormal       Result Value    Sodium 136      Potassium 3.3 (*)     Chloride 98      Carbon Dioxide (CO2) 27      Anion Gap 11      Urea Nitrogen 25.9 (*)     Creatinine 1.28 (*)     GFR Estimate 72      Calcium 8.3 (*)     Glucose 193 (*)    INR - Abnormal    INR 1.13      PT 14.9 (*)    GLUCOSE BY METER - Abnormal    GLUCOSE BY METER POCT 206 (*)    CBC WITH PLATELETS AND DIFFERENTIAL - Abnormal    WBC Count 8.0      RBC Count 4.39 (*)     Hemoglobin 10.8 (*)     Hematocrit 33.8 (*)     MCV 77 (*)     MCH 24.6 (*)     MCHC 32.0      RDW 14.2      Platelet Count 158      % Neutrophils 58      % Lymphocytes 24      % Monocytes 11      % Eosinophils 7      % Basophils 1      % Immature Granulocytes 0      NRBCs per 100 WBC 0      Absolute Neutrophils 4.6      Absolute Lymphocytes 1.9      Absolute Monocytes 0.9      Absolute Eosinophils 0.5      Absolute Basophils 0.0      Absolute Immature Granulocytes 0.0      Absolute NRBCs 0.0     CRP INFLAMMATION - Abnormal    CRP Inflammation 57.50 (*)    ERYTHROCYTE SEDIMENTATION RATE AUTO - Abnormal    Erythrocyte Sedimentation Rate 24 (*)    PARTIAL THROMBOPLASTIN TIME - Normal    aPTT 38     TROPONIN T, HIGH SENSITIVITY - Normal    Troponin T, High Sensitivity <6     HEMOGLOBIN A1C - Normal    Estimated Average Glucose 111      Hemoglobin A1C 5.5     TSH WITH FREE T4 REFLEX - Normal    TSH 1.45     ETHANOL LEVEL BLOOD - Normal    Ethanol Level Blood <0.01     MAGNESIUM - Normal    Magnesium 2.1     LIPASE - Normal    Lipase 23     HEPATIC FUNCTION PANEL - Normal    Protein Total 7.1      Albumin 3.7      Bilirubin Total 0.4      Alkaline Phosphatase 132      AST 29      ALT 21      Bilirubin Direct 0.15     GLUCOSE MONITOR  NURSING POCT   FRACTIONAL EXCRETION OF SODIUM     MR Brain w/o & w Contrast   Final Result   IMPRESSION:   1.  Normal head MRI.      CT Head Perfusion w Contrast - For Tier 2 Stroke   Final Result      CTA Head Neck with Contrast   Final Result    IMPRESSION:    HEAD CTA:    1.  No intracranial arterial large vessel occlusion or significant stenosis.      2.  No brain aneurysm identified.      3.  No AVM/AVF.      NECK CTA:   1.  No significant stenosis or occlusion identified. No cervical artery dissection.         Dr Kaylan Eagle was notified by Dr Atul Duncan at  1:42 AM 6/7/2025 CST/CDT.      CT Head w/o Contrast   Final Result   IMPRESSION:   1.  No acute intracranial hemorrhage identified.      2.  No CT evidence acute infarct. Aspect score 10.      Dr Kaylan Eagle was notified by Dr Atul Duncan at  1:42 AM 6/7/2025 CST/CDT.             Critical Care Addendum  My initial assessment, based on my review of nursing observations, review of vital signs, focused history, and physical exam, established a high suspicion that Parish Carter has ischemic or hemorrhagic stroke, which requires immediate intervention, and therefore he is critically ill.     After the initial assessment, the care team initiated multiple lab tests, initiated IV fluid administration, and consulted with neurology to provide stabilization care. Due to the critical nature of this patient, I reassessed nursing observations, vital signs, physical exam, 12 lead ECG analysis, mental status, and neurologic status multiple times prior to his disposition.     Time also spent performing documentation, discussion with family to obtain medical information for decision making, reviewing test results, discussion with consultants, and coordination of care.     Critical care time (excluding teaching time and procedures): 34 minutes.       Assessment & Plan    Patient with normal vitals on arrival.  Direction changing horizontal nystagmus noted on examination  but otherwise no focal neurologic deficits.  Tier 2 stroke code called with onset of symptoms yesterday.  Point-of-care glucose 206.  CT imaging preliminarily negative.  Neurology recommends MRI with and without contrast.  Patient given IV fluids.  Creatinine 1.28.  Troponin negative.  Hemoglobin 10.8 with a history of previous mild anemia.  TSH normal.  Hemoglobin A1c 5.5.  Inflammatory markers moderately elevated with unclear significance.  Neurology did not appreciate any cranial nerve deficits on their examination.  Alcohol level negative.  Lipase and hepatic function normal.  EKG normal.  MRI brain with and without resulted normal.  Neurology cleared for discharge with no specific neurology follow-up.  Patient does have a primary care provider.  He feels improved and passed test of ambulation and p.o. intake.  Recommend ongoing oral hydration and follow-up with primary care provider for further evaluation.  Return precautions provided.        I have reviewed the nursing notes. I have reviewed the findings, diagnosis, plan and need for follow up with the patient.    Discharge Medication List as of 6/7/2025  5:46 AM          Final diagnoses:   Double vision   I, Arlene Garcia, am serving as a trained medical scribe to document services personally performed by Kaylan Eagle MD, based on the provider's statements to me.     IKaylan MD, was physically present and have reviewed and verified the accuracy of this note documented by Arlene Garcia.     Kaylan Eagle MD  [facility] EMERGENCY DEPARTMENT  6/7/2025     Kaylan Eagle MD  06/07/25 0040

## 2025-06-12 LAB
ATRIAL RATE - MUSE: 74 BPM
DIASTOLIC BLOOD PRESSURE - MUSE: NORMAL MMHG
INTERPRETATION ECG - MUSE: NORMAL
P AXIS - MUSE: 51 DEGREES
PR INTERVAL - MUSE: 152 MS
QRS DURATION - MUSE: 90 MS
QT - MUSE: 416 MS
QTC - MUSE: 461 MS
R AXIS - MUSE: 34 DEGREES
SYSTOLIC BLOOD PRESSURE - MUSE: NORMAL MMHG
T AXIS - MUSE: 41 DEGREES
VENTRICULAR RATE- MUSE: 74 BPM

## (undated) DEVICE — KIT ENDO FIRST STEP DISINFECTANT 200ML W/POUCH EP-4

## (undated) DEVICE — PACK HAND WRIST SOP15HWFSP

## (undated) DEVICE — PAD CHUX UNDERPAD 23X24" 7136

## (undated) DEVICE — PREP CHLORAPREP 26ML TINTED ORANGE  260815

## (undated) DEVICE — GLOVE PROTEXIS W/NEU-THERA 8.0  2D73TE80

## (undated) DEVICE — SOL WATER IRRIG 1000ML BOTTLE 2F7114

## (undated) DEVICE — SOL WATER IRRIG 500ML BOTTLE 2F7113

## (undated) DEVICE — CAST PADDING 4" UNSTERILE 9044

## (undated) DEVICE — DRAPE STERI TOWEL SM 1000

## (undated) DEVICE — GLOVE PROTEXIS BLUE W/NEU-THERA 7.5  2D73EB75

## (undated) DEVICE — NDL 19GA 1.5"

## (undated) DEVICE — SUCTION MANIFOLD NEPTUNE 2 SYS 1 PORT 702-025-000

## (undated) DEVICE — TUBING SUCTION 6"X3/16" N56A

## (undated) DEVICE — TUBING SUCTION MEDI-VAC 1/4"X20' N620A

## (undated) DEVICE — NDL 25GA 1.5" 305127

## (undated) DEVICE — SU ETHILON 4-0 FS-2 18" 662H

## (undated) DEVICE — GOWN IMPERVIOUS 2XL BLUE

## (undated) DEVICE — BNDG ELASTIC 4"X5YDS UNSTERILE 6611-40

## (undated) DEVICE — SOL WATER IRRIG 1000ML BOTTLE 07139-09

## (undated) DEVICE — SPECIMEN CONTAINER 3OZ W/FORMALIN 59901

## (undated) DEVICE — PAD CHUX UNDERPAD 30X36" P3036C

## (undated) DEVICE — KIT ENDO TURNOVER/PROCEDURE CARRY-ON 101822

## (undated) DEVICE — CAST PLASTER SPLINT 4X15" 7394

## (undated) DEVICE — GLOVE PROTEXIS W/NEU-THERA 7.5  2D73TE75

## (undated) RX ORDER — FENTANYL CITRATE 50 UG/ML
INJECTION, SOLUTION INTRAMUSCULAR; INTRAVENOUS
Status: DISPENSED
Start: 2024-08-26

## (undated) RX ORDER — ACETAMINOPHEN 325 MG/1
TABLET ORAL
Status: DISPENSED
Start: 2020-02-27

## (undated) RX ORDER — FENTANYL CITRATE 50 UG/ML
INJECTION, SOLUTION INTRAMUSCULAR; INTRAVENOUS
Status: DISPENSED
Start: 2020-02-27

## (undated) RX ORDER — GABAPENTIN 300 MG/1
CAPSULE ORAL
Status: DISPENSED
Start: 2020-02-27